# Patient Record
Sex: FEMALE | Race: WHITE | Employment: OTHER | URBAN - METROPOLITAN AREA
[De-identification: names, ages, dates, MRNs, and addresses within clinical notes are randomized per-mention and may not be internally consistent; named-entity substitution may affect disease eponyms.]

---

## 2018-04-13 ENCOUNTER — HOSPITAL ENCOUNTER (OUTPATIENT)
Dept: GENERAL RADIOLOGY | Age: 63
Discharge: HOME | End: 2018-04-13
Attending: INTERNAL MEDICINE
Payer: MEDICARE

## 2018-04-13 DIAGNOSIS — K59.00 CONSTIPATION: ICD-10-CM

## 2018-04-13 PROCEDURE — 74019 RADEX ABDOMEN 2 VIEWS: CPT

## 2018-05-03 ENCOUNTER — OFFICE VISIT (OUTPATIENT)
Dept: FAMILY MEDICINE CLINIC | Age: 63
End: 2018-05-03

## 2018-05-03 VITALS
WEIGHT: 228 LBS | OXYGEN SATURATION: 97 % | TEMPERATURE: 98.1 F | HEART RATE: 67 BPM | HEIGHT: 67 IN | DIASTOLIC BLOOD PRESSURE: 83 MMHG | BODY MASS INDEX: 35.79 KG/M2 | RESPIRATION RATE: 18 BRPM | SYSTOLIC BLOOD PRESSURE: 121 MMHG

## 2018-05-03 DIAGNOSIS — E11.9 TYPE 2 DIABETES MELLITUS WITHOUT COMPLICATION, WITHOUT LONG-TERM CURRENT USE OF INSULIN (HCC): ICD-10-CM

## 2018-05-03 DIAGNOSIS — I48.19 PERSISTENT ATRIAL FIBRILLATION (HCC): Primary | ICD-10-CM

## 2018-05-03 RX ORDER — ROPINIROLE 1 MG/1
TABLET, FILM COATED ORAL 3 TIMES DAILY
COMMUNITY
End: 2018-09-27 | Stop reason: CLARIF

## 2018-05-03 RX ORDER — LORAZEPAM 1 MG/1
TABLET ORAL
COMMUNITY
End: 2018-07-18 | Stop reason: SDUPTHER

## 2018-05-03 RX ORDER — MONTELUKAST SODIUM 4 MG/1
1 TABLET, CHEWABLE ORAL 2 TIMES DAILY
COMMUNITY
End: 2018-07-18 | Stop reason: ALTCHOICE

## 2018-05-03 RX ORDER — ROSUVASTATIN CALCIUM 40 MG/1
20 TABLET, COATED ORAL
COMMUNITY

## 2018-05-03 RX ORDER — LEVOTHYROXINE SODIUM 150 UG/1
TABLET ORAL
COMMUNITY
End: 2018-07-27 | Stop reason: SDUPTHER

## 2018-05-03 RX ORDER — MONTELUKAST SODIUM 10 MG/1
10 TABLET ORAL DAILY
COMMUNITY

## 2018-05-03 RX ORDER — GABAPENTIN 300 MG/1
300 CAPSULE ORAL 3 TIMES DAILY
COMMUNITY
End: 2018-07-18 | Stop reason: ALTCHOICE

## 2018-05-03 RX ORDER — ISOSORBIDE MONONITRATE 30 MG/1
TABLET, EXTENDED RELEASE ORAL DAILY
COMMUNITY
End: 2018-09-27 | Stop reason: CLARIF

## 2018-05-03 RX ORDER — OXCARBAZEPINE 150 MG/1
TABLET, FILM COATED ORAL
COMMUNITY
End: 2018-09-27 | Stop reason: CLARIF

## 2018-05-03 NOTE — PROGRESS NOTES
Pt here to establish acre. Reports she was evaluated in ED last night for chest pain. States she has been diagnosed with Afib and eft bundle branch block. Denies having any pain at this time. Pt reports that she has been to [facility] three times this year, first two were ER visits, last time she was admitted due to chest pain. Pt reports that she had multiple EKGs, cardiac cath, echo, CT scan- all were normal, no stents. Pt was first diagnosed with afib in 2008, had 3 ablations in 2010, was placed on metoprolol with good control, however [facility] told her that her afib had returned. Pt see Dr. Caroline Grover for cardiology as well as Dr. Tati De La Cruz. Pt reports that she has had chest pain since 2008, improved after the ablation, but has returned int he past 3 months. Pt espinal report high levels of stress at this time due to illness in her family. Pt reports that her BP was elevated in the ER last night, does normally have a problem with blood pressure issues. Last HgA1c was 6.7\    Pt is a retired teracher    Subjective: (As above and below)   No chief complaint on file. she is a 57y.o. year old female who presents for evaluation. Reviewed PmHx, RxHx, FmHx, SocHx, AllgHx and updated in chart.     Review of Systems - negative except as listed above    Objective:     Vitals:    05/03/18 1424   BP: 121/83   Pulse: 67   Resp: 18   Temp: 98.1 °F (36.7 °C)   TempSrc: Oral   SpO2: 97%   Weight: 228 lb (103.4 kg)   Height: 5' 7\" (1.702 m)     Physical Examination: General appearance - alert, well appearing, and in no distress  Mental status - normal mood, behavior, speech, dress, motor activity, and thought processes  Mouth - mucous membranes moist, pharynx normal without lesions  Chest - clear to auscultation, no wheezes, rales or rhonchi, symmetric air entry  Heart - irregularly irregular   Musculoskeletal - walks with a cane  Extremities - peripheral pulses normal, no pedal edema, no clubbing or cyanosis    Bladder pacemaker in place  Assessment/ Plan:   1. Persistent atrial fibrillation ([address]  -continue on current medications, keep cardiology follow up    2. Type 2 diabetes mellitus without complication, without long-term current use of insulin ([address]  Well controlled per pt reports  Last eye exam was 1/2018      Follow-up Disposition: As needed  I have discussed the diagnosis with the patient and the intended plan as seen in the above orders. The patient has received an after-visit summary and questions were answered concerning future plans. Medication Side Effects and Warnings were discussed with patient: yes  Patient Labs were reviewed: yes  Patient Past Records were reviewed:  yes    Jason Mckeon M.D. Discussed the patient's BMI with her. The BMI follow up plan is as follows:     dietary management education, guidance, and counseling  encourage exercise  monitor weight  prescribed dietary intake    An After Visit Summary was printed and given to the patient.

## 2018-05-03 NOTE — PATIENT INSTRUCTIONS
Body Mass Index: Care Instructions  Your Care Instructions    Body mass index (BMI) can help you see if your weight is raising your risk for health problems. It uses a formula to compare how much you weigh with how tall you are. · A BMI lower than 18.5 is considered underweight. · A BMI between 18.5 and 24.9 is considered healthy. · A BMI between 25 and 29.9 is considered overweight. A BMI of 30 or higher is considered obese. If your BMI is in the normal range, it means that you have a lower risk for weight-related health problems. If your BMI is in the overweight or obese range, you may be at increased risk for weight-related health problems, such as high blood pressure, heart disease, stroke, arthritis or joint pain, and diabetes. If your BMI is in the underweight range, you may be at increased risk for health problems such as fatigue, lower protection (immunity) against illness, muscle loss, bone loss, hair loss, and hormone problems. BMI is just one measure of your risk for weight-related health problems. You may be at higher risk for health problems if you are not active, you eat an unhealthy diet, or you drink too much alcohol or use tobacco products. Follow-up care is a key part of your treatment and safety. Be sure to make and go to all appointments, and call your doctor if you are having problems. It's also a good idea to know your test results and keep a list of the medicines you take. How can you care for yourself at home? · Practice healthy eating habits. This includes eating plenty of fruits, vegetables, whole grains, lean protein, and low-fat dairy. · If your doctor recommends it, get more exercise. Walking is a good choice. Bit by bit, increase the amount you walk every day. Try for at least 30 minutes on most days of the week. · Do not smoke. Smoking can increase your risk for health problems. If you need help quitting, talk to your doctor about stop-smoking programs and medicines. These can increase your chances of quitting for good. · Limit alcohol to 2 drinks a day for men and 1 drink a day for women. Too much alcohol can cause health problems. If you have a BMI higher than 25  · Your doctor may do other tests to check your risk for weight-related health problems. This may include measuring the distance around your waist. A waist measurement of more than 40 inches in men or 35 inches in women can increase the risk of weight-related health problems. · Talk with your doctor about steps you can take to stay healthy or improve your health. You may need to make lifestyle changes to lose weight and stay healthy, such as changing your diet and getting regular exercise. If you have a BMI lower than 18.5  · Your doctor may do other tests to check your risk for health problems. · Talk with your doctor about steps you can take to stay healthy or improve your health. You may need to make lifestyle changes to gain or maintain weight and stay healthy, such as getting more healthy foods in your diet and doing exercises to build muscle. Where can you learn more? Go to http://junior-mckayla.info/. Enter S176 in the search box to learn more about \"Body Mass Index: Care Instructions. \"  Current as of: October 13, 2016  Content Version: 11.4  © 6581-4058 [employer]wise[employer]. Care instructions adapted under license by [employer] (which disclaims liability or warranty for this information). If you have questions about a medical condition or this instruction, always ask your healthcare professional. [address] any warranty or liability for your use of this information.

## 2018-05-03 NOTE — MR AVS SNAPSHOT
[address] [address] 
[phone] Patient: Jalen Elizondo MRN: [id number] BTD:4/8/[birth year]41 Visit Information Date & Time Provider Department Dept. Phone Encounter #  
 5/3/2018  2:00 PM Ashkan Hardin MD [address] [facility] [phone] [id number] Your Appointments 7/26/2018 11:00 AM  
New Patient with Mortimer Brash., MD  
[facility] at [facility] [address] [facility]) Appt Note: Np 4 mth f/up for migraines and tremors [place] 03/30/18  
 [facility], 
[address] [place] [address], [address] [phone]  
[address] [facility], [address] [place] [address], [address] [phone] Upcoming Health Maintenance Date Due Hepatitis C Screening 1955 COLONOSCOPY 5/4/1971 DTaP/Tdap/Td series (1 - Tdap) 5/4/1974 PAP AKA CERVICAL CYTOLOGY 5/4/1974 BREAST CANCER SCRN MAMMOGRAM 5/4/2003 ZOSTER VACCINE AGE 60> 3/4/2013 MEDICARE YEARLY EXAM 3/28/2018 Bone Densitometry (Dexa) Screening 5/4/2018 Influenza Age 5 to Adult 8/1/2018 Allergies as of 5/3/2018  Review Complete On: 5/3/2018 By: Suzi Gonzales MD  
  
 Severity Noted Reaction Type Reactions Shellfish Derived High 07/12/2013    Anaphylaxis Ciprofloxacin  04/21/2016    Hives Theophylline  07/12/2013    Hives Current Immunizations  Never Reviewed No immunizations on file. Not reviewed this visit You Were Diagnosed With   
  
 Codes Comments Persistent atrial fibrillation (HCC)    -  Primary ICD-10-CM: I48.1 ICD-9-CM: 427.31 Type 2 diabetes mellitus without complication, without long-term current use of insulin (HCC)     ICD-10-CM: E11.9 ICD-9-CM: 250.00 Vitals BP Pulse Temp Resp Height(growth percentile) Weight(growth percentile)  121/83 (BP 1 Location: Right arm, BP Patient Position: Sitting) 67 98.1 °F (36.7 °C) (Oral) 18 5' 7\" (1.702 m) 228 lb (103.4 kg) SpO2 BMI OB Status Smoking Status 97% 35.71 kg/m2 Postmenopausal Never Smoker Vitals History BMI and BSA Data Body Mass Index Body Surface Area 35.71 kg/m 2 2.21 m 2 Preferred Pharmacy Pharmacy Name Phone [facility] [address] [place] [address] [phone] Your Updated Medication List  
  
   
This list is accurate as of 5/3/18  2:59 PM.  Always use your most recent med list.  
  
  
  
  
 COLESTID 1 gram tablet Generic drug:  colestipol Take 1 g by mouth two (2) times a day. CRESTOR 40 mg tablet Generic drug:  rosuvastatin Take 40 mg by mouth nightly.  
  
 gabapentin 300 mg capsule Commonly known as:  NEURONTIN Take 300 mg by mouth three (3) times daily. isosorbide mononitrate ER 30 mg tablet Commonly known as:  IMDUR Take  by mouth daily. JANUVIA 100 mg tablet Generic drug:  SITagliptin Take 100 mg by mouth daily. JARDIANCE 10 mg tablet Generic drug:  empagliflozin Take  by mouth daily. levothyroxine 150 mcg tablet Commonly known as:  SYNTHROID Take  by mouth Daily (before breakfast). LORazepam 1 mg tablet Commonly known as:  ATIVAN Take  by mouth every four (4) hours as needed for Anxiety. montelukast 10 mg tablet Commonly known as:  SINGULAIR Take 10 mg by mouth daily. OXcarbazepine 150 mg tablet Commonly known as:  TRILEPTAL Take  by mouth. QVAR 80 mcg/actuation [employer] Generic drug:  beclomethasone Take 1 Puff by inhalation two (2) times a day. REQUIP 1 mg tablet Generic drug:  rOPINIRole Take  by mouth three (3) times daily. XARELTO 20 mg Tab tablet Generic drug:  rivaroxaban Take  by mouth daily. Patient Instructions Body Mass Index: Care Instructions Your Care Instructions Body mass index (BMI) can help you see if your weight is raising your risk for health problems. It uses a formula to compare how much you weigh with how tall you are. · A BMI lower than 18.5 is considered underweight. · A BMI between 18.5 and 24.9 is considered healthy. · A BMI between 25 and 29.9 is considered overweight. A BMI of 30 or higher is considered obese. If your BMI is in the normal range, it means that you have a lower risk for weight-related health problems. If your BMI is in the overweight or obese range, you may be at increased risk for weight-related health problems, such as high blood pressure, heart disease, stroke, arthritis or joint pain, and diabetes. If your BMI is in the underweight range, you may be at increased risk for health problems such as fatigue, lower protection (immunity) against illness, muscle loss, bone loss, hair loss, and hormone problems. BMI is just one measure of your risk for weight-related health problems. You may be at higher risk for health problems if you are not active, you eat an unhealthy diet, or you drink too much alcohol or use tobacco products. Follow-up care is a key part of your treatment and safety. Be sure to make and go to all appointments, and call your doctor if you are having problems. It's also a good idea to know your test results and keep a list of the medicines you take. How can you care for yourself at home? · Practice healthy eating habits. This includes eating plenty of fruits, vegetables, whole grains, lean protein, and low-fat dairy. · If your doctor recommends it, get more exercise. Walking is a good choice. Bit by bit, increase the amount you walk every day. Try for at least 30 minutes on most days of the week. · Do not smoke. Smoking can increase your risk for health problems. If you need help quitting, talk to your doctor about stop-smoking programs and medicines. These can increase your chances of quitting for good. · Limit alcohol to 2 drinks a day for men and 1 drink a day for women. Too much alcohol can cause health problems. If you have a BMI higher than 25 · Your doctor may do other tests to check your risk for weight-related health problems. This may include measuring the distance around your waist. A waist measurement of more than 40 inches in men or 35 inches in women can increase the risk of weight-related health problems. · Talk with your doctor about steps you can take to stay healthy or improve your health. You may need to make lifestyle changes to lose weight and stay healthy, such as changing your diet and getting regular exercise. If you have a BMI lower than 18.5 · Your doctor may do other tests to check your risk for health problems. · Talk with your doctor about steps you can take to stay healthy or improve your health. You may need to make lifestyle changes to gain or maintain weight and stay healthy, such as getting more healthy foods in your diet and doing exercises to build muscle. Where can you learn more? Go to http://junior-mckayla.info/. Enter S176 in the search box to learn more about \"Body Mass Index: Care Instructions. \" Current as of: October 13, 2016 Content Version: 11.4 © 8383-4945 [employer]. Care instructions adapted under license by [employer] (which disclaims liability or warranty for this information). If you have questions about a medical condition or this instruction, always ask your healthcare professional. [address] any warranty or liability for your use of this information. Introducing [facility]! Dear Carley Bañuelos: Thank you for requesting a [employer] account. Our records indicate that you have previously registered for a [employer] account but its currently inactive. Please call our [employer] support line at [phone]. Additional Information If you have questions, please visit the Frequently Asked Questions section of the [employer] website at [email or web address] Remember, [employer] is NOT to be used for urgent needs. For medical emergencies, dial 911. Now available from your iPhone and Android! Please provide this summary of care documentation to your next provider. Your primary care clinician is listed as Vipul Hardin. If you have any questions after today's visit, please call [phone].

## 2018-06-27 ENCOUNTER — DOCUMENTATION ONLY (OUTPATIENT)
Dept: FAMILY MEDICINE CLINIC | Age: 63
End: 2018-06-27

## 2018-06-27 NOTE — PROGRESS NOTES
Patient would like the Verification of Chronic Condition Form completed. It is in the bin up front. Please fax when completed to [facility] [employer] at [phone].  Patient's phone: [phone]

## 2018-06-30 PROBLEM — J45.909 ASTHMA: Status: ACTIVE | Noted: 2018-06-30

## 2018-07-18 ENCOUNTER — HOSPITAL ENCOUNTER (OUTPATIENT)
Dept: LAB | Age: 63
Discharge: HOME | End: 2018-07-18
Payer: MEDICARE

## 2018-07-18 ENCOUNTER — OFFICE VISIT (OUTPATIENT)
Dept: FAMILY MEDICINE CLINIC | Age: 63
End: 2018-07-18

## 2018-07-18 VITALS
DIASTOLIC BLOOD PRESSURE: 85 MMHG | SYSTOLIC BLOOD PRESSURE: 133 MMHG | OXYGEN SATURATION: 94 % | TEMPERATURE: 98.4 F | HEIGHT: 67 IN | BODY MASS INDEX: 34.84 KG/M2 | RESPIRATION RATE: 19 BRPM | HEART RATE: 89 BPM | WEIGHT: 222 LBS

## 2018-07-18 DIAGNOSIS — Z51.81 MEDICATION MONITORING ENCOUNTER: ICD-10-CM

## 2018-07-18 DIAGNOSIS — R42 VERTIGO: ICD-10-CM

## 2018-07-18 DIAGNOSIS — I48.19 PERSISTENT ATRIAL FIBRILLATION (HCC): Primary | ICD-10-CM

## 2018-07-18 LAB
BILIRUB UR QL STRIP: NEGATIVE
GLUCOSE UR-MCNC: NORMAL MG/DL
KETONES P FAST UR STRIP-MCNC: NEGATIVE MG/DL
PH UR STRIP: 6 [PH] (ref 4.6–8)
PROT UR QL STRIP: NEGATIVE
SP GR UR STRIP: 1.01 (ref 1–1.03)
UA UROBILINOGEN AMB POC: NORMAL (ref 0.2–1)
URINALYSIS CLARITY POC: CLEAR
URINALYSIS COLOR POC: YELLOW
URINE BLOOD POC: NEGATIVE
URINE LEUKOCYTES POC: NEGATIVE
URINE NITRITES POC: NEGATIVE

## 2018-07-18 PROCEDURE — 82043 UR ALBUMIN QUANTITATIVE: CPT

## 2018-07-18 RX ORDER — BUPROPION HYDROCHLORIDE 300 MG/1
300 TABLET ORAL
COMMUNITY
End: 2018-10-04 | Stop reason: CLARIF

## 2018-07-18 RX ORDER — METOPROLOL TARTRATE 50 MG/1
TABLET ORAL
COMMUNITY
End: 2018-09-27 | Stop reason: CLARIF

## 2018-07-18 RX ORDER — HYDROCODONE BITARTRATE AND ACETAMINOPHEN 5; 325 MG/1; MG/1
1 TABLET ORAL
COMMUNITY
Start: 2016-04-21 | End: 2018-10-04 | Stop reason: CLARIF

## 2018-07-18 RX ORDER — DULOXETIN HYDROCHLORIDE 60 MG/1
120 CAPSULE, DELAYED RELEASE ORAL DAILY
COMMUNITY
Start: 2018-07-18

## 2018-07-18 RX ORDER — TRAZODONE HYDROCHLORIDE 150 MG/1
100-200 TABLET ORAL
COMMUNITY

## 2018-07-18 RX ORDER — ONDANSETRON 4 MG/1
4 TABLET, ORALLY DISINTEGRATING ORAL
COMMUNITY
Start: 2018-07-18

## 2018-07-18 RX ORDER — LORAZEPAM 1 MG/1
TABLET ORAL
COMMUNITY
Start: 2018-07-18 | End: 2018-10-04 | Stop reason: CLARIF

## 2018-07-18 RX ORDER — CYCLOBENZAPRINE HCL 5 MG
5 TABLET ORAL
COMMUNITY
Start: 2018-03-06 | End: 2018-10-04 | Stop reason: CLARIF

## 2018-07-18 NOTE — PROGRESS NOTES
Chief Complaint   Patient presents with    Dizziness     Started 5/9/18   [facility] Follow Up     Jorge's on 6/22/17-6/26/17     Pt seen in the office today for c/o of dizziness. She states the daily episodes started 5/9/18 shortly after a surgical procedure days after. Pt presents with a very unsteady gait ambulating with the assistance of a cane. She has since stopped driving and is currently being followed by ENT who originally prescribed Meclizine that has not been effective. MRI has been ordered per pt. Pt also reports on 6/22-6/26 she was admitted to [facility] for self harm and contemplating overdosing Hydrocodone. She stated she called her psych provider, advised pt to seek urgent care. Pt contracts for safety, denies self harm. Denies SI/HI at this time. Pt's spouse accompanied her, he remains in the waiting room. Pt is concerned that one of her medications could be causing her vertigo symptoms. Pt brought all of her medications-extensive medication review completed, multiple discrepancies between our list, pharmacy records and what she has. List was updated to reflect medication pt brought with her and reported taking. Pt had her labs done with endo, had an eye exam last month per pt report. Subjective: (As above and below)     Chief Complaint   Patient presents with    Dizziness     Started 5/9/18   [facility] Follow Up     Jorge's on 6/22/17-6/26/17     she is a 70y.o. year old female who presents for evaluation. Reviewed PmHx, RxHx, FmHx, SocHx, AllgHx and updated in chart.     Review of Systems - negative except as listed above    Objective:     Vitals:    07/18/18 0721   BP: 133/85   Pulse: 89   Resp: 19   Temp: 98.4 °F (36.9 °C)   TempSrc: Oral   SpO2: 94%   Weight: 222 lb (100.7 kg)   Height: 5' 7\" (1.702 m)     Physical Examination: General appearance - alert, well appearing, and in no distress  Mental status - depressed, reports stress related to illness in her parents and her own health  Mouth - mucous membranes moist, pharynx normal without lesions  Chest - clear to auscultation, no wheezes, rales or rhonchi, symmetric air entry  Heart - normal rate, regular rhythm, normal S1, S2, no murmurs, rubs, clicks or gallops  Musculoskeletal - walks with a cane, unsteady  Extremities - peripheral pulses normal, no pedal edema, no clubbing or cyanosis    Assessment/ Plan:   1. Persistent atrial fibrillation ([address]  -continue Xarelto    -pt reports that she is no longer on this medication- has the pill bottle with her  - LORazepam (ATIVAN) 1 mg tablet; Please take one tablet at bedtime and one half tablet daily as needed    2. Vertigo  -check labs, d/c Singulair  -Follow up MRI  - MICROALBUMIN, UR, RAND W/ MICROALB/CREAT RATIO  - AMB POC URINALYSIS DIP STICK AUTO W/O MICRO    3. Medication monitoring encounter  -extensive medication counseling and discussion for side effects and reconciliation      Follow-up Disposition: As needed  I have discussed the diagnosis with the patient and the intended plan as seen in the above orders. The patient has received an after-visit summary and questions were answered concerning future plans.      Medication Side Effects and Warnings were discussed with patient: yes  Patient Labs were reviewed: yes  Patient Past Records were reviewed:  yes    Zulma Sanon M.D.

## 2018-07-18 NOTE — MR AVS SNAPSHOT
[address] [address] 
[phone] Patient: Mahnaz Bravo MRN: [id number] YQC:2/6/1619 Visit Information Date & Time Provider Department Dept. Phone Encounter #  
 7/18/2018  7:15 AM Germán Shah MD [address] [facility] [phone] [id number] Your Appointments 7/26/2018 11:00 AM  
New Patient with Felix Moffett MD  
[facility]: Np 4 mth f/up for migraines and tremors [place] 03/30/18  
 [address] [place] [address], 
[address] [place] [address], [address] [phone]  
[address] [facility], [address] [facility], [address] [address] Upcoming Health Maintenance Date Due Hepatitis C Screening 1955 HEMOGLOBIN A1C Q6M 1955 LIPID PANEL Q1 1955 FOOT EXAM Q1 5/4/1963 MICROALBUMIN Q1 5/4/1963 EYE EXAM RETINAL OR DILATED Q1 5/4/1963 COLONOSCOPY 5/4/1971 DTaP/Tdap/Td series (1 - Tdap) 5/4/1974 ZOSTER VACCINE AGE 60> 3/4/2013 MEDICARE YEARLY EXAM 3/28/2018 GLAUCOMA SCREENING Q2Y 5/4/2018 Bone Densitometry (Dexa) Screening 5/4/2018 Pneumococcal 65+ Low/Medium Risk (1 of 2 - PCV13) 5/4/2018 Influenza Age 5 to Adult 8/1/2018 BREAST CANCER SCRN MAMMOGRAM 6/7/2020 Allergies as of 7/18/2018  Review Complete On: 7/18/2018 By: Germán Shah MD  
  
 Severity Noted Reaction Type Reactions Shellfish Derived High 07/12/2013    Anaphylaxis Ciprofloxacin  04/21/2016    Hives Theophylline  07/12/2013    Hives Current Immunizations  Reviewed on 6/11/2018 Name Date Zoster Recombinant 6/9/2018 Not reviewed this visit You Were Diagnosed With   
  
 Codes Comments Persistent atrial fibrillation (HCC)    -  Primary ICD-10-CM: I48.1 ICD-9-CM: 427.31 Vertigo     ICD-10-CM: V17 ICD-9-CM: 780.4 Medication monitoring encounter     ICD-10-CM: Z51.81 
ICD-9-CM: V58.83 Vitals BP Pulse Temp Resp Height(growth percentile) Weight(growth percentile) 133/85 (BP 1 Location: Left arm, BP Patient Position: Sitting) 89 98.4 °F (36.9 °C) (Oral) 19 5' 7\" (1.702 m) 222 lb (100.7 kg) SpO2 BMI OB Status Smoking Status 94% 34.77 kg/m2 Postmenopausal Never Smoker Vitals History BMI and BSA Data Body Mass Index Body Surface Area 34.77 kg/m 2 2.18 m 2 Preferred Pharmacy Pharmacy Name Phone [facility] [address] [facility], [address] [place] [address] [phone] Your Updated Medication List  
  
   
This list is accurate as of 7/18/18  8:09 AM.  Always use your most recent med list.  
  
  
  
  
 buPROPion  mg XL tablet Commonly known as:  Franco  Take 300 mg by mouth. CRESTOR 40 mg tablet Generic drug:  rosuvastatin Take 40 mg by mouth nightly. cyclobenzaprine 5 mg tablet Commonly known as:  FLEXERIL Take 5 mg by mouth daily as needed. DULoxetine 60 mg capsule Commonly known as:  CYMBALTA Take 2 Caps by mouth daily. HYDROcodone-acetaminophen 5-325 mg per tablet Commonly known as:  Birda Moffat Take 1 Tab by mouth two (2) times daily as needed. isosorbide mononitrate ER 30 mg tablet Commonly known as:  IMDUR Take  by mouth daily. JANUVIA 100 mg tablet Generic drug:  SITagliptin Take 100 mg by mouth daily. JARDIANCE 10 mg tablet Generic drug:  empagliflozin Take  by mouth daily. levothyroxine 150 mcg tablet Commonly known as:  SYNTHROID Take  by mouth Daily (before breakfast). LORazepam 1 mg tablet Commonly known as:  ATIVAN Please take one tablet at bedtime and one half tablet daily as needed  
  
 metoprolol tartrate 50 mg tablet Commonly known as:  LOPRESSOR Take  by mouth.  
  
 montelukast 10 mg tablet Commonly known as:  SINGULAIR Take 10 mg by mouth daily. ondansetron 4 mg disintegrating tablet Commonly known as:  ZOFRAN ODT Take 1 Tab by mouth every eight (8) hours as needed for Nausea. OXcarbazepine 150 mg tablet Commonly known as:  TRILEPTAL Take  by mouth. QVAR 80 mcg/actuation [place]ÃƒÂº Generic drug:  beclomethasone Take 1 Puff by inhalation two (2) times a day. REQUIP 1 mg tablet Generic drug:  rOPINIRole Take  by mouth three (3) times daily. traZODone 150 mg tablet Commonly known as:  Alonso Oh Take 100-200 mg by mouth nightly. XARELTO 20 mg Tab tablet Generic drug:  rivaroxaban Take  by mouth daily. We Performed the Following AMB POC URINALYSIS DIP STICK AUTO W/O MICRO [34102 CPT(R)] MICROALBUMIN, UR, RAND W/ MICROALB/CREAT RATIO [id number] CPT(R)] Introducing [facility]! [employer] introduces [employer] patient portal. Now you can access parts of your medical record, email your doctor's office, and request medication refills online. 1. In your internet browser, go to [email or web address] 2. Click on the First Time User? Click Here link in the Sign In box. You will see the New Member Sign Up page. 3. Enter your [employer] Access Code exactly as it appears below. You will not need to use this code after youve completed the sign-up process. If you do not sign up before the expiration date, you must request a new code. · [employer] Access Code: [email or web address] Expires: 8/15/2018  5:25 AM 
 
4. Enter the last four digits of your Social Security Number (xxxx) and Date of Birth (mm/dd/yyyy) as indicated and click Submit. You will be taken to the next sign-up page. 5. Create a [employer] ID. This will be your [employer] login ID and cannot be changed, so think of one that is secure and easy to remember. 6. Create a [employer] password. You can change your password at any time. 7. Enter your Password Reset Question and Answer. This can be used at a later time if you forget your password. 8. Enter your e-mail address. You will receive e-mail notification when new information is available in [address] 9. Click Sign Up. You can now view and download portions of your medical record. 10. Click the Download Summary menu link to download a portable copy of your medical information. If you have questions, please visit the Frequently Asked Questions section of the [employer] website. Remember, [employer] is NOT to be used for urgent needs. For medical emergencies, dial 911. Now available from your iPhone and Android! Please provide this summary of care documentation to your next provider. Your primary care clinician is listed as Vipul Hardin. If you have any questions after today's visit, please call [phone].

## 2018-07-20 LAB
ALBUMIN/CREAT UR: 5.7 MG/G CREAT (ref 0–30)
CREAT UR-MCNC: 76.8 MG/DL
MICROALBUMIN UR-MCNC: 4.4 UG/ML

## 2018-07-26 ENCOUNTER — TELEPHONE (OUTPATIENT)
Dept: NEUROLOGY | Age: 63
End: 2018-07-26

## 2018-07-26 ENCOUNTER — DOCUMENTATION ONLY (OUTPATIENT)
Dept: NEUROLOGY | Age: 63
End: 2018-07-26

## 2018-07-26 ENCOUNTER — OFFICE VISIT (OUTPATIENT)
Dept: NEUROLOGY | Age: 63
End: 2018-07-26

## 2018-07-26 VITALS
DIASTOLIC BLOOD PRESSURE: 62 MMHG | SYSTOLIC BLOOD PRESSURE: 124 MMHG | OXYGEN SATURATION: 98 % | WEIGHT: 231.6 LBS | BODY MASS INDEX: 36.35 KG/M2 | HEART RATE: 99 BPM | HEIGHT: 67 IN

## 2018-07-26 DIAGNOSIS — R42 DIZZINESS: ICD-10-CM

## 2018-07-26 DIAGNOSIS — G50.1 ATYPICAL FACIAL PAIN: Primary | ICD-10-CM

## 2018-07-26 DIAGNOSIS — G25.0 ESSENTIAL TREMOR: ICD-10-CM

## 2018-07-26 DIAGNOSIS — G25.81 RESTLESS LEG SYNDROME: ICD-10-CM

## 2018-07-26 DIAGNOSIS — I95.1 ORTHOSTATIC HYPOTENSION: ICD-10-CM

## 2018-07-26 PROBLEM — E66.01 SEVERE OBESITY (BMI 35.0-39.9): Status: ACTIVE | Noted: 2018-07-26

## 2018-07-26 NOTE — PROGRESS NOTES
NEUROLOGY CLINIC NOTE    Patient ID:  Ben Carbone  [id number]  19 y.o.  1955    Date of Consultation:  July 26, 2018    Reason for Consultation:  Establish care    Chief Complaint   Patient presents with    Follow-up     migraines/vertigo       History of Present Illness:     Patient Active Problem List    Diagnosis Date Noted    Severe obesity (BMI 35.0-39.9) ([address] 07/26/2018    Asthma 06/30/2018    Diabetes ([address]     Factor V Leiden ([address]     [facility]     Supraorbital headache 10/07/2015    Isthmica nodosa salpingitis 07/12/2013    Anxiety 07/12/2013    Essential and other specified forms of tremor 07/12/2013     Past Medical History:   Diagnosis Date    A-fib [facility])     Anxiety 7/12/2013    Arrhythmia     \"fast heart rate\". Ablation x2    Arthritis     Asthma     Depression     Diabetes ([address]     Factor V Leiden ([facility]     Isthmica nodosa salpingitis 7/12/2013    Thyroid disease       Past Surgical History:   Procedure Laterality Date    ABDOMEN SURGERY PROC UNLISTED      cholecystectomy    CARDIAC SURG PROCEDURE UNLIST      ablation 2010 x2    HX CHOLECYSTECTOMY      HX HEENT      HX PACEMAKER        Prior to Admission medications    Medication Sig Start Date End Date Taking? Authorizing Provider   metoprolol tartrate (LOPRESSOR) 50 mg tablet Take  by mouth. Yes Historical Provider   LORazepam (ATIVAN) 1 mg tablet Please take one tablet at bedtime and one half tablet daily as needed 7/18/18  Yes Leilani Hardin MD   buPROPion XL (WELLBUTRIN XL) 300 mg XL tablet Take 300 mg by mouth. Yes Historical Provider   cyclobenzaprine (FLEXERIL) 5 mg tablet Take 5 mg by mouth daily as needed. 3/6/18  Yes Historical Provider   HYDROcodone-acetaminophen (NORCO) 5-325 mg per tablet Take 1 Tab by mouth two (2) times daily as needed. 4/21/16  Yes Historical Provider   DULoxetine (CYMBALTA) 60 mg capsule Take 2 Caps by mouth daily.  7/18/18  Yes Rochelle Haley MD   rivaroxaban (XARELTO) 20 mg tab tablet Take  by mouth daily. Yes Historical Provider   empagliflozin (JARDIANCE) 10 mg tablet Take  by mouth daily. Yes Historical Provider   OXcarbazepine (TRILEPTAL) 150 mg tablet Take  by mouth. Yes Historical Provider   levothyroxine (SYNTHROID) 150 mcg tablet Take  by mouth Daily (before breakfast). Yes Historical Provider   isosorbide mononitrate ER (IMDUR) 30 mg tablet Take  by mouth daily. Yes Historical Provider   rosuvastatin (CRESTOR) 40 mg tablet Take 40 mg by mouth nightly. Yes Historical Provider   montelukast (SINGULAIR) 10 mg tablet Take 10 mg by mouth daily. Yes Historical Provider   rOPINIRole (REQUIP) 1 mg tablet Take  by mouth three (3) times daily. Yes Historical Provider   traZODone (DESYREL) 150 mg tablet Take 100-200 mg by mouth nightly. Historical Provider   ondansetron (ZOFRAN ODT) 4 mg disintegrating tablet Take 1 Tab by mouth every eight (8) hours as needed for Nausea. 7/18/18   Yaz Hardin MD   SITagliptin (JANUVIA) 100 mg tablet Take 100 mg by mouth daily. Historical Provider   beclomethasone (QVAR) 80 mcg/actuation aero Take 1 Puff by inhalation two (2) times a day. Historical Provider     Allergies   Allergen Reactions    Shellfish Derived Anaphylaxis    Ciprofloxacin Hives    Theophylline Hives      Social History   Substance Use Topics    Smoking status: Never Smoker    Smokeless tobacco: Never Used    Alcohol use No      Family History   Problem Relation Age of Onset    Heart Disease Father     Cancer Maternal Grandfather         Subjective:      Bushra Miller is a 70 y.o. IWHT with multiple medical problems who is being seen by neurology for a typical facial pain, essential tremors and restless leg syndrome who is here to establish her care. Patient was previously seen at [address] [place] [address] neurology. Per patient she would only have headaches with a sinus infections. No known triggers.  No new medications or adjustments were made. Gradual onset. Located left mid eyebrow and radiates up the left forehead. Sharp, ache or burn, sometimes a pressure. Rangers from 1 to 7/10. It would lasts from 30 minutes to one hour but can recur for as long as 2 weeks. Associated with light and noise sensitivity. No known precipitants. Light or noise makes it worse. Sleeping helps. She does have pain free periods for 1 to 2 weeks. Site is tender and sensitive to touch. She saw Dr. Dickson Murphy (neurologist) 9/25/2015 and was referred to Dr. Leanna Lao for supraorbital nerve block. She was seen 10/7/2015 and did received the nerve block. It offered relief but then recurred. She has had two 7/10 exacerbations. One was last 10/13/2015 and she was seen at [facility]. Head CT done was normal compared to one done 9/10/2013. Labs revealed mild hypokalemia. Treated with IV fluids and medications. Discharged on Tramadol. Next episode was 12/11/15. That episode was different in that pain started to radiate to the left zygomaticus area and temple. Seen at [facility]. Treated with IV fluids and medications. Discharged on [phone]. She does have pain free periods for 1 to 2 weeks. Site is tender and sensitive to touch. Last episode of facial pain was a week PTC. She reports no issues with sleep. Sleep 8 to 9 hours a night. Wakes up feeling rested. No daytime sleepiness. No vivid dreams. She does mention issue with postural and intentional intermittent hand tremors. She was seeing Dr. Ange Reynoso for it and was being treated with Topiramate. She is currently taking 200 mg at bedtime. She has not seen any improvement of the intermittent tremors. Tremors occurred while she was taking  her psychiatric medications. 7/17/2017, labs done revealed elevated glucose, slightly elevated creatinine, low GFR and elevated ALT. None detected oxcarbazepine level. Since her last evaluation on 3/30/2018, patient in the interim was seen by Dr. Tra Franks (neurologist) last 5/18/2018.   His plan was to discontinue gabapentin and Requip and patient to start Lyrica 75 mg at bedtime. Brain MRI was also ordered which per patient was done at [facility] and reported to be unremarkable. Patient also in the interim was admitted [facility] last April 2018 for chest pain. Patient was seen by cardiology 4/16/2018  Dr. Bindu Rodriguez who wanted to obtain a cardiac MRI pending okay due to bladder stimulator. Patient had an event monitor placed and was read on 4/25/2018 with no evidence of atrial fibrillation and rare PVCs. Per patient she did stop gabapentin. She reports no recurrence of her migraine headaches or facial pain. No severe pain left temple radiating to her left eye. She continues to take Oxcarbazepine 150 mg BID. Denies any side effects. Her hand tremors continue be better and leg jerking or restlessness at night have resolved with ropinirole 1 mg 2 tablets at night. Patient has had recent bouts of significant depression and suicidal ideation. Father has cancer and is out of state. Her psychiatrist is aware and recently started her on Cymbalta. She continues to use her CPAP for her KATELYNN. Patient reports last 3/9/2018 she was having workup for spinal stimulator. She then developed sudden onset of dizziness and vertigo. Patient was seen at [facility] ER. Head CT and labs were reported to be unremarkable. Discharge to home. Patient was then seen by Fermin Lopez, ENT and found to have left hearing loss and prescribed meclizine which offered no benefit. Per patient since of dizziness continues to persist. Not as severe as before. Occurs at rest but worse when standing. Occurs 4 to 5 times in a day. Longest without is 5 hours. She has not been driving because of it. Outside reports reviewed: office notes, historical medical records.     Review of Systems:    A comprehensive review of systems was negative except for:   Constitutional: positive for fatigue   Eyes: positive for none  Ears, nose, mouth, throat, and face: positive for none  Respiratory: positive for none  Cardiovascular: positive for none  Gastrointestinal: positive for none  Genitourinary: positive for none  Integument/breast: positive for none  Hematologic/lymphatic: positive for easy bruising   Musculoskeletal: positive for arthralgia, myalgia   Neurological: positive for dizziness   Behavioral/Psych: positive for depression   Endocrine: positive for none  Allergic/Immunologic: positive for none      Objective:     Visit Vitals    /62    Pulse 99    Ht 5' 7\" (1.702 m)    Wt 231 lb 9.6 oz (105.1 kg)    SpO2 98%    BMI 36.27 kg/m2   3/10 generalized pain    PHYSICAL EXAM:    NEUROLOGICAL EXAM:    Appearance: The patient is obese, provides a coherent history and is in no acute distress. Mental Status: Oriented to time, place and person. Fluent, no aphasia or dysarthria. Flat affect. Cranial Nerves:   Intact visual fields. LEROY, EOM's full, no nystagmus, no ptosis. Facial sensation is normal. Corneal reflexes are intact. Facial movement is symmetric. Hearing is normal bilaterally. Palate is midline with normal elevation. Sternocleidomastoid and trapezius muscles are normal. Tongue is midline. Motor:  5/5 strength in upper and lower proximal and distal muscles. Normal bulk and tone. No fasciculations. Reflexes:   Deep tendon reflexes 1+/4 and symmetrical.    Sensory:   Normal to touch, pinprick and vibration. Gait:   Steady. No Romberg. Tremor:   No tremors noted. Cerebellar:  Intact FTN/ABHAY/HTS. Neurovascular:  No carotid bruits. Assessment:   Atypical facial pain  Dizziness  Orthostatic hypotension  Essential tremor  Restless leg syndrome    Plan:   Neurological examination is unchanged. It is still non-focal. There is no left CN V sensory deficit seen. This is not uncommon for trigeminal neuralgia or supraorbital neuralgia. No indication yet to do another neuroimaging.  Previous head CT done twice was reported to be normal. Type of pain is not typical for migraine headaches. Possible cluster headache. Continue Oxcarbazepine 150 mg daily and up to BID if necessary. Other options include AED (Lyrica, Depakote and etc.), TCA's (Amitriptyline and etc.) and etc. Potential supraorbital or trial of occipital nerve blocks if condition worsens. Check CBC, CMP and oxcarbazepine level. Exam does not reveal any brainstem or cerebellar dysfunction. Suspect because of dizzy spells is not primarily neurological but likely due to her medical condition or medications. May benefit from vestibular rehab. Advised to try as needed Ativan as she ready has and see if it offers relief. Check B12, folate, magnesium, TSH and ammonia levels. Supine blood pressure was 167/87 and standing blood pressure was 113/82. Patient was symptomatic upon standing with her dizzy spell and wobbliness. Findings consistent with orthostatic hypotension. Patient was referred back to her cardiologist for further evaluation. Patient was advised to do transitioning, wear support stockings and increase salt intake. Fall precautions. No obvious tremor seen today. Medication induced or worsened by previous psychiatric medication. No features typically seen in Parkinson's disease. No upper motor neuron findings. Likely with some elements of essential tremors. Potential treatment options if worsens include Propranolol, Primidone and Benzodiazepines. Monitor for now given improvement. Complaint is suspicious for RLS and per patient it was recorded during her sleep study. Likely secondary RLS due to potential side effect from psychiatric medications. Improved with Ropinirole. Other potential options include Sinemet, other dopamine agonists and long acting Gabapentin. All questions and concerns were answered. Visit lasted 45 minutes.   Greater than 50% was spent discussing her condition, potential etiologies, review of medical records from her cardiologist and neurologist, presence of orthostatic hypotension and need for reevaluation by cardiology, steps to take to help reduce tendency to have orthostatic hypotension, check and do laboratory workup to look for reversible causes of her dizzy spells, fall precautions, treatment options and medication

## 2018-07-26 NOTE — TELEPHONE ENCOUNTER
Spoke with patient and gave her the appointment for Dr. Marcos Peterson office  Which is August 1,2018 at 2 pm. For patient to arrive 20-30 minutes early. Patient understood.

## 2018-07-26 NOTE — PATIENT INSTRUCTIONS
A Healthy Lifestyle: Care Instructions Your Care Instructions A healthy lifestyle can help you feel good, stay at a healthy weight, and have plenty of energy for both work and play. A healthy lifestyle is something you can share with your whole family. A healthy lifestyle also can lower your risk for serious health problems, such as high blood pressure, heart disease, and diabetes. You can follow a few steps listed below to improve your health and the health of your family. Follow-up care is a key part of your treatment and safety. Be sure to make and go to all appointments, and call your doctor if you are having problems. It's also a good idea to know your test results and keep a list of the medicines you take. How can you care for yourself at home? · Do not eat too much sugar, fat, or fast foods. You can still have dessert and treats now and then. The goal is moderation. · Start small to improve your eating habits. Pay attention to portion sizes, drink less juice and soda pop, and eat more fruits and vegetables. ¨ Eat a healthy amount of food. A 3-ounce serving of meat, for example, is about the size of a deck of cards. Fill the rest of your plate with vegetables and whole grains. ¨ Limit the amount of soda and sports drinks you have every day. Drink more water when you are thirsty. ¨ Eat at least 5 servings of fruits and vegetables every day. It may seem like a lot, but it is not hard to reach this goal. A serving or helping is 1 piece of fruit, 1 cup of vegetables, or 2 cups of leafy, raw vegetables. Have an apple or some carrot sticks as an afternoon snack instead of a candy bar. Try to have fruits and/or vegetables at every meal. 
· Make exercise part of your daily routine. You may want to start with simple activities, such as walking, bicycling, or slow swimming. Try to be active 30 to 60 minutes every day. You do not need to do all 30 to 60 minutes all at once.  For example, you can exercise 3 times a day for 10 or 20 minutes. Moderate exercise is safe for most people, but it is always a good idea to talk to your doctor before starting an exercise program. 
· Keep moving. [place] the lawn, work in the garden, or [employer]. Take the stairs instead of the elevator at work. · If you smoke, quit. People who smoke have an increased risk for heart attack, stroke, cancer, and other lung illnesses. Quitting is hard, but there are ways to boost your chance of quitting tobacco for good. ¨ Use nicotine gum, patches, or lozenges. ¨ Ask your doctor about stop-smoking programs and medicines. ¨ Keep trying. In addition to reducing your risk of diseases in the future, you will notice some benefits soon after you stop using tobacco. If you have shortness of breath or asthma symptoms, they will likely get better within a few weeks after you quit. · Limit how much alcohol you drink. Moderate amounts of alcohol (up to 2 drinks a day for men, 1 drink a day for women) are okay. But drinking too much can lead to liver problems, high blood pressure, and other health problems. Family health If you have a family, there are many things you can do together to improve your health. · Eat meals together as a family as often as possible. · Eat healthy foods. This includes fruits, vegetables, lean meats and dairy, and whole grains. · Include your family in your fitness plan. Most people think of activities such as jogging or tennis as the way to fitness, but there are many ways you and your family can be more active. Anything that makes you breathe hard and gets your heart pumping is exercise. Here are some tips: 
¨ Walk to do errands or to take your child to school or the bus. ¨ Go for a family bike ride after dinner instead of watching TV. Where can you learn more? Go to http://junior-mckayla.info/. Enter U831 in the search box to learn more about \"A Healthy Lifestyle: Care Instructions. \" Current as of: December 7, 2017 Content Version: 11.7 © 7649-6543 [employer]. Care instructions adapted under license by [employer] (which disclaims liability or warranty for this information). If you have questions about a medical condition or this instruction, always ask your healthcare professional. [address] any warranty or liability for your use of this information. Please be advised there is a $25 fee for all paperwork to be completed from our  providers. This is to be paid by the patient prior to picking up the completed forms. Dizziness: Care Instructions Your Care Instructions Dizziness is the feeling of unsteadiness or fuzziness in your head. It is different than having vertigo, which is a feeling that the room is spinning or that you are moving or falling. It is also different from lightheadedness, which is the feeling that you are about to faint. It can be hard to know what causes dizziness. Some people feel dizzy when they have migraine headaches. Sometimes bouts of flu can make you feel dizzy. Some medical conditions, such as heart problems or high blood pressure, can make you feel dizzy. Many medicines can cause dizziness, including medicines for high blood pressure, pain, or anxiety. If a medicine causes your symptoms, your doctor may recommend that you stop or change the medicine. If it is a problem with your heart, you may need medicine to help your heart work better. If there is no clear reason for your symptoms, your doctor may suggest watching and waiting for a while to see if the dizziness goes away on its own. Follow-up care is a key part of your treatment and safety. Be sure to make and go to all appointments, and call your doctor if you are having problems. It's also a good idea to know your test results and keep a list of the medicines you take. How can you care for yourself at home?  
· If your doctor recommends or prescribes medicine, take it exactly as directed. Call your doctor if you think you are having a problem with your medicine. · Do not drive while you feel dizzy. · Try to prevent falls. Steps you can take include: ¨ Using nonskid mats, adding grab bars near the tub, and using night-lights. ¨ Clearing your home so that walkways are free of anything you might trip on. ¨ Letting family and friends know that you have been feeling dizzy. This will help them know how to help you. When should you call for help? Call 911 anytime you think you may need emergency care. For example, call if: 
  · You passed out (lost consciousness).  
  · You have dizziness along with symptoms of a heart attack. These may include: ¨ Chest pain or pressure, or a strange feeling in the chest. 
¨ Sweating. ¨ Shortness of breath. ¨ Nausea or vomiting. ¨ Pain, pressure, or a strange feeling in the back, neck, jaw, or upper belly or in one or both shoulders or arms. ¨ Lightheadedness or sudden weakness. ¨ A fast or irregular heartbeat.  
  · You have symptoms of a stroke. These may include: 
¨ Sudden numbness, tingling, weakness, or loss of movement in your face, arm, or leg, especially on only one side of your body. ¨ Sudden vision changes. ¨ Sudden trouble speaking. ¨ Sudden confusion or trouble understanding simple statements. ¨ Sudden problems with walking or balance. ¨ A sudden, severe headache that is different from past headaches.  
 Call your doctor now or seek immediate medical care if: 
  · You feel dizzy and have a fever, headache, or ringing in your ears.  
  · You have new or increased nausea and vomiting.  
  · Your dizziness does not go away or comes back.  
 Watch closely for changes in your health, and be sure to contact your doctor if: 
  · You do not get better as expected. Where can you learn more? Go to http://junior-mckayla.info/.  
Enter E447 in the search box to learn more about \"Dizziness: Care Instructions. \" Current as of: November 20, 2017 Content Version: 11.7 © 9106-3545 [employer]. Care instructions adapted under license by "[employer]" (which disclaims liability or warranty for this information). If you have questions about a medical condition or this instruction, always ask your healthcare professional. [address] any warranty or liability for your use of this information. Head or Face Pain: Care Instructions Your Care Instructions Common causes of head or face pain are allergies, stress, and injuries. Other causes include tooth problems and sinus infections. Eating certain foods, such as chocolate or cheese, or drinking certain liquids, such as coffee or cola, can cause head pain for some people. If you have mild head pain, you may not need treatment. It is important to watch your symptoms and talk to your doctor if your pain continues or gets worse. Follow-up care is a key part of your treatment and safety. Be sure to make and go to all appointments, and call your doctor if you are having problems. It's also a good idea to know your test results and keep a list of the medicines you take. How can you care for yourself at home? · Take pain medicines exactly as directed. ¨ If the doctor gave you a prescription medicine for pain, take it as prescribed. ¨ If you are not taking a prescription pain medicine, ask your doctor if you can take an over-the-counter pain medicine. · Take it easy for the next few days or longer if you are not feeling well. · Use a warm, moist towel or heating pad set on low to relax tight muscles in your shoulder and neck. Have someone gently massage your neck and shoulders. · Put ice or a cold pack on the area for 10 to 20 minutes at a time. Put a thin cloth between the ice and your skin. When should you call for help? Call 911 anytime you think you may need emergency care.  For example, call if: 
  · You have twitching, jerking, or a seizure.  
  · You passed out (lost consciousness).  
  · You have symptoms of a stroke. These may include: 
¨ Sudden numbness, tingling, weakness, or loss of movement in your face, arm, or leg, especially on only one side of your body. ¨ Sudden vision changes. ¨ Sudden trouble speaking. ¨ Sudden confusion or trouble understanding simple statements. ¨ Sudden problems with walking or balance. ¨ A sudden, severe headache that is different from past headaches.  
  · You have jaw pain and pain in your chest, shoulder, neck, or arm.  
 Call your doctor now or seek immediate medical care if: 
  · You have a fever with a stiff neck or a severe headache.  
  · You have nausea and vomiting, or you cannot keep food or liquids down.  
 Watch closely for changes in your health, and be sure to contact your doctor if: 
  · Your head or face pain does not get better as expected. Where can you learn more? Go to http://junior-mckayla.info/. Enter P568 in the search box to learn more about \"Head or Face Pain: Care Instructions. \" Current as of: November 20, 2017 Content Version: 11.7 © 3739-2892 [employer]. Care instructions adapted under license by [employer] (which disclaims liability or warranty for this information). If you have questions about a medical condition or this instruction, always ask your healthcare professional. [address] any warranty or liability for your use of this information. Preventing Falls: Care Instructions Your Care Instructions Getting around your home safely can be a challenge if you have injuries or health problems that make it easy for you to fall. Loose rugs and furniture in walkways are among the dangers for many older people who have problems walking or who have poor eyesight.  People who have conditions such as arthritis, osteoporosis, or dementia also have to be careful not to fall. 
You can make your home safer with a few simple measures. Follow-up care is a key part of your treatment and safety. Be sure to make and go to all appointments, and call your doctor if you are having problems. It's also a good idea to know your test results and keep a list of the medicines you take. How can you care for yourself at home? Taking care of yourself · You may get dizzy if you do not drink enough water. To prevent dehydration, drink plenty of fluids, enough so that your urine is light yellow or clear like water. Choose water and other caffeine-free clear liquids. If you have kidney, heart, or liver disease and have to limit fluids, talk with your doctor before you increase the amount of fluids you drink. · Exercise regularly to improve your strength, muscle tone, and balance. Walk if you can. Swimming may be a good choice if you cannot walk easily. · Have your vision and hearing checked each year or any time you notice a change. If you have trouble seeing and hearing, you might not be able to avoid objects and could lose your balance. · Know the side effects of the medicines you take. Ask your doctor or pharmacist whether the medicines you take can affect your balance. Sleeping pills or sedatives can affect your balance. · Limit the amount of alcohol you drink. Alcohol can impair your balance and other senses. · Ask your doctor whether calluses or corns on your feet need to be removed. If you wear loose-fitting shoes because of calluses or corns, you can lose your balance and fall. · Talk to your doctor if you have numbness in your feet. Preventing falls at home · Remove raised doorway thresholds, throw rugs, and clutter. Repair loose carpet or raised areas in the floor. · Move furniture and electrical cords to keep them out of walking paths. · Use nonskid floor wax, and wipe up spills right away, especially on ceramic tile floors. · If you use a walker or cane, put rubber tips on it. If you use crutches, clean the bottoms of them regularly with an abrasive pad, such as steel wool. · Keep your house well lit, especially [place], and outside walkways. Use night-lights in areas such as hallways and bathrooms. Add extra light switches or use remote switches (such as switches that go on or off when you clap your hands) to make it easier to turn lights on if you have to get up during the night. · Install sturdy handrails on stairways. · Move items in your cabinets so that the things you use a lot are on the lower shelves (about waist level). · Keep a cordless phone and a flashlight with new batteries by your bed. If possible, put a phone in each of the main rooms of your house, or carry a cell phone in case you fall and cannot reach a phone. Or, you can wear a device around your neck or wrist. You push a button that sends a signal for help. · Wear low-heeled shoes that fit well and give your feet good support. Use footwear with nonskid soles. Check the heels and soles of your shoes for wear. Repair or replace worn heels or soles. · Do not wear socks without shoes on wood floors. · Walk on the grass when the sidewalks are slippery. If you live in an area that gets snow and ice in the winter, sprinkle salt on slippery steps and sidewalks. Preventing falls in the bath · Install grab bars and nonskid mats inside and outside your shower or tub and near the toilet and sinks. · Use shower chairs and bath benches. · Use a hand-held shower head that will allow you to sit while showering. · Get into a tub or shower by putting the weaker leg in first. Get out of a tub or shower with your strong side first. 
· Repair loose toilet seats and consider installing a raised toilet seat to make getting on and off the toilet easier. · Keep your bathroom door unlocked while you are in the shower. Where can you learn more? Go to http://junior-mckayla.info/.  
Enter [id number] in the search box to learn more about \"Preventing Falls: Care Instructions. \" Current as of: May 12, 2017 Content Version: 11.7 © 2394-4969 [email or web address]. Care instructions adapted under license by [employer] (which disclaims liability or warranty for this information). If you have questions about a medical condition or this instruction, always ask your healthcare professional. [place] [address] any warranty or liability for your use of this information.

## 2018-07-26 NOTE — MR AVS SNAPSHOT
[address], 
[address], [address] [facility] 
[phone] Patient: Marlene Negrete MRN: [id number] TJS:9/9/[birth year]93 Visit Information Date & Time Provider Department Dept. Phone Encounter #  
 7/26/2018 11:00 AM Ivania Herrera MD [facility] at [facility] [phone] [id number] Follow-up Instructions Return in about 2 months (around 9/26/2018) for dizziness, neuralgia. Upcoming Health Maintenance Date Due Hepatitis C Screening 1955 HEMOGLOBIN A1C Q6M 1955 LIPID PANEL Q1 1955 FOOT EXAM Q1 5/4/1963 EYE EXAM RETINAL OR DILATED Q1 5/4/1963 COLONOSCOPY 5/4/1971 DTaP/Tdap/Td series (1 - Tdap) 5/4/1974 ZOSTER VACCINE AGE 60> 3/4/2013 MEDICARE YEARLY EXAM 3/28/2018 GLAUCOMA SCREENING Q2Y 5/4/2018 Bone Densitometry (Dexa) Screening 5/4/2018 Pneumococcal 65+ Low/Medium Risk (1 of 2 - PCV13) 5/4/2018 Influenza Age 5 to Adult 8/1/2018 MICROALBUMIN Q1 7/18/2019 BREAST CANCER SCRN MAMMOGRAM 6/7/2020 Allergies as of 7/26/2018  Review Complete On: 7/26/2018 By: Gary Riddle Severity Noted Reaction Type Reactions Shellfish Derived High 07/12/2013    Anaphylaxis Ciprofloxacin  04/21/2016    Hives Theophylline  07/12/2013    Hives Current Immunizations  Reviewed on 6/11/2018 Name Date Zoster Recombinant 6/9/2018 Not reviewed this visit You Were Diagnosed With   
  
 Codes Comments Atypical facial pain    -  Primary ICD-10-CM: G50.1 ICD-9-CM: 350.2 Dizziness     ICD-10-CM: B86 ICD-9-CM: 780.4 Orthostatic hypotension     ICD-10-CM: I95.1 ICD-9-CM: 458.0 Essential tremor     ICD-10-CM: G25.0 ICD-9-CM: 333.1 Restless leg syndrome     ICD-10-CM: G25.81 ICD-9-CM: 333.94 Vitals BP Pulse Height(growth percentile) Weight(growth percentile) SpO2 BMI 124/62 99 5' 7\" (1.702 m) 231 lb 9.6 oz (105.1 kg) 98% 36.27 kg/m2 OB Status Smoking Status Postmenopausal Never Smoker BMI and BSA Data Body Mass Index Body Surface Area  
 36.27 kg/m 2 2.23 m 2 Preferred Pharmacy Pharmacy Name Phone [facility] [address] [place] [address], [address] [place] [address] [address] [phone] Your Updated Medication List  
  
   
This list is accurate as of 7/26/18 12:07 PM.  Always use your most recent med list.  
  
  
  
  
 buPROPion  mg XL tablet Commonly known as:  [place] Take 300 mg by mouth. CRESTOR 40 mg tablet Generic drug:  rosuvastatin Take 40 mg by mouth nightly. cyclobenzaprine 5 mg tablet Commonly known as:  FLEXERIL Take 5 mg by mouth daily as needed. DULoxetine 60 mg capsule Commonly known as:  CYMBALTA Take 2 Caps by mouth daily. HYDROcodone-acetaminophen 5-325 mg per tablet Commonly known as:  Alison Fothergill Take 1 Tab by mouth two (2) times daily as needed. isosorbide mononitrate ER 30 mg tablet Commonly known as:  IMDUR Take  by mouth daily. JANUVIA 100 mg tablet Generic drug:  SITagliptin Take 100 mg by mouth daily. JARDIANCE 10 mg tablet Generic drug:  empagliflozin Take  by mouth daily. levothyroxine 150 mcg tablet Commonly known as:  SYNTHROID Take  by mouth Daily (before breakfast). LORazepam 1 mg tablet Commonly known as:  ATIVAN Please take one tablet at bedtime and one half tablet daily as needed  
  
 metoprolol tartrate 50 mg tablet Commonly known as:  LOPRESSOR Take  by mouth.  
  
 montelukast 10 mg tablet Commonly known as:  SINGULAIR Take 10 mg by mouth daily. ondansetron 4 mg disintegrating tablet Commonly known as:  ZOFRAN ODT Take 1 Tab by mouth every eight (8) hours as needed for Nausea. OXcarbazepine 150 mg tablet Commonly known as:  TRILEPTAL  
 Take  by mouth. QVAR 80 mcg/actuation ABC Live Generic drug:  beclomethasone Take 1 Puff by inhalation two (2) times a day. REQUIP 1 mg tablet Generic drug:  rOPINIRole Take  by mouth three (3) times daily. traZODone 150 mg tablet Commonly known as:  Scarlett Tamayos Take 100-200 mg by mouth nightly. XARELTO 20 mg Tab tablet Generic drug:  rivaroxaban Take  by mouth daily. We Performed the Following CBC WITH AUTOMATED DIFF [27810 CPT(R)] MAGNESIUM [id number] CPT(R)] METABOLIC PANEL, COMPREHENSIVE [82952 CPT(R)] OXCARBAZEPINE(TRILEPTAL) [id number] CPT(R)] REFERRAL TO CARDIOLOGY [QRA83 Custom] Comments:  
 Care of Dr. Hermelinda Walker at [facility]. Assess for constant dizziness and periods of vertigo. Found to be significantly orthostatic in clinic and symptomatic. TSH [[id number] Custom] VITAMIN B12 & FOLATE [20091 CPT(R)] Follow-up Instructions Return in about 2 months (around 9/26/2018) for dizziness, neuralgia. To-Do List   
 08/26/2018 Lab:  [facility] Referral Information Referral ID Referred By Referred To  
  
 [id number] Donta Ravi Not Available Visits Status Start Date End Date 1 New Request 7/26/18 7/26/19 If your referral has a status of pending review or denied, additional information will be sent to support the outcome of this decision. Patient Instructions A Healthy Lifestyle: Care Instructions Your Care Instructions A healthy lifestyle can help you feel good, stay at a healthy weight, and have plenty of energy for both work and play. A healthy lifestyle is something you can share with your whole family. A healthy lifestyle also can lower your risk for serious health problems, such as high blood pressure, heart disease, and diabetes. You can follow a few steps listed below to improve your health and the health of your family. Follow-up care is a key part of your treatment and safety. Be sure to make and go to all appointments, and call your doctor if you are having problems. It's also a good idea to know your test results and keep a list of the medicines you take. How can you care for yourself at home? · Do not eat too much sugar, fat, or fast foods. You can still have dessert and treats now and then. The goal is moderation. · Start small to improve your eating habits. Pay attention to portion sizes, drink less juice and soda pop, and eat more fruits and vegetables. ¨ Eat a healthy amount of food. A 3-ounce serving of meat, for example, is about the size of a deck of cards. Fill the rest of your plate with vegetables and whole grains. ¨ Limit the amount of soda and sports drinks you have every day. Drink more water when you are thirsty. ¨ Eat at least 5 servings of fruits and vegetables every day. It may seem like a lot, but it is not hard to reach this goal. A serving or helping is 1 piece of fruit, 1 cup of vegetables, or 2 cups of leafy, raw vegetables. Have an apple or some carrot sticks as an afternoon snack instead of a candy bar. Try to have fruits and/or vegetables at every meal. 
· Make exercise part of your daily routine. You may want to start with simple activities, such as walking, bicycling, or slow swimming. Try to be active 30 to 60 minutes every day. You do not need to do all 30 to 60 minutes all at once. For example, you can exercise 3 times a day for 10 or 20 minutes. Moderate exercise is safe for most people, but it is always a good idea to talk to your doctor before starting an exercise program. 
· Keep moving. [place] Feeling the lawn, work in the garden, or Twice. Take the stairs instead of the elevator at work. · If you smoke, quit. People who smoke have an increased risk for heart attack, stroke, cancer, and other lung illnesses.  Quitting is hard, but there are ways to boost your chance of quitting tobacco for good. ¨ Use nicotine gum, patches, or lozenges. ¨ Ask your doctor about stop-smoking programs and medicines. ¨ Keep trying. In addition to reducing your risk of diseases in the future, you will notice some benefits soon after you stop using tobacco. If you have shortness of breath or asthma symptoms, they will likely get better within a few weeks after you quit. · Limit how much alcohol you drink. Moderate amounts of alcohol (up to 2 drinks a day for men, 1 drink a day for women) are okay. But drinking too much can lead to liver problems, high blood pressure, and other health problems. Family health If you have a family, there are many things you can do together to improve your health. · Eat meals together as a family as often as possible. · Eat healthy foods. This includes fruits, vegetables, lean meats and dairy, and whole grains. · Include your family in your fitness plan. Most people think of activities such as jogging or tennis as the way to fitness, but there are many ways you and your family can be more active. Anything that makes you breathe hard and gets your heart pumping is exercise. Here are some tips: 
¨ Walk to do errands or to take your child to school or the bus. ¨ Go for a family bike ride after dinner instead of watching TV. Where can you learn more? Go to http://junior-mckayla.info/. Enter A569 in the search box to learn more about \"A Healthy Lifestyle: Care Instructions. \" Current as of: December 7, 2017 Content Version: 11.7 © 2680-2795 [employer]. Care instructions adapted under license by [employer] (which disclaims liability or warranty for this information). If you have questions about a medical condition or this instruction, always ask your healthcare professional. [address] any warranty or liability for your use of this information. Please be advised there is a $25 fee for all paperwork to be completed from our  providers. This is to be paid by the patient prior to picking up the completed forms. Dizziness: Care Instructions Your Care Instructions Dizziness is the feeling of unsteadiness or fuzziness in your head. It is different than having vertigo, which is a feeling that the room is spinning or that you are moving or falling. It is also different from lightheadedness, which is the feeling that you are about to faint. It can be hard to know what causes dizziness. Some people feel dizzy when they have migraine headaches. Sometimes bouts of flu can make you feel dizzy. Some medical conditions, such as heart problems or high blood pressure, can make you feel dizzy. Many medicines can cause dizziness, including medicines for high blood pressure, pain, or anxiety. If a medicine causes your symptoms, your doctor may recommend that you stop or change the medicine. If it is a problem with your heart, you may need medicine to help your heart work better. If there is no clear reason for your symptoms, your doctor may suggest watching and waiting for a while to see if the dizziness goes away on its own. Follow-up care is a key part of your treatment and safety. Be sure to make and go to all appointments, and call your doctor if you are having problems. It's also a good idea to know your test results and keep a list of the medicines you take. How can you care for yourself at home? · If your doctor recommends or prescribes medicine, take it exactly as directed. Call your doctor if you think you are having a problem with your medicine. · Do not drive while you feel dizzy. · Try to prevent falls. Steps you can take include: ¨ Using nonskid mats, adding grab bars near the tub, and using night-lights. ¨ Clearing your home so that walkways are free of anything you might trip on. ¨ Letting family and friends know that you have been feeling dizzy. This will help them know how to help you. When should you call for help? Call 911 anytime you think you may need emergency care. For example, call if: 
  · You passed out (lost consciousness).  
  · You have dizziness along with symptoms of a heart attack. These may include: ¨ Chest pain or pressure, or a strange feeling in the chest. 
¨ Sweating. ¨ Shortness of breath. ¨ Nausea or vomiting. ¨ Pain, pressure, or a strange feeling in the back, neck, jaw, or upper belly or in one or both shoulders or arms. ¨ Lightheadedness or sudden weakness. ¨ A fast or irregular heartbeat.  
  · You have symptoms of a stroke. These may include: 
¨ Sudden numbness, tingling, weakness, or loss of movement in your face, arm, or leg, especially on only one side of your body. ¨ Sudden vision changes. ¨ Sudden trouble speaking. ¨ Sudden confusion or trouble understanding simple statements. ¨ Sudden problems with walking or balance. ¨ A sudden, severe headache that is different from past headaches.  
 Call your doctor now or seek immediate medical care if: 
  · You feel dizzy and have a fever, headache, or ringing in your ears.  
  · You have new or increased nausea and vomiting.  
  · Your dizziness does not go away or comes back.  
 Watch closely for changes in your health, and be sure to contact your doctor if: 
  · You do not get better as expected. Where can you learn more? Go to http://junior-mckayla.info/. Enter Z640 in the search box to learn more about \"Dizziness: Care Instructions. \" Current as of: November 20, 2017 Content Version: 11.7 © 6924-4546 [employer]. Care instructions adapted under license by [employer] (which disclaims liability or warranty for this information).  If you have questions about a medical condition or this instruction, always ask your healthcare professional. [employer] disclaims any warranty or liability for your use of this information. Head or Face Pain: Care Instructions Your Care Instructions Common causes of head or face pain are allergies, stress, and injuries. Other causes include tooth problems and sinus infections. Eating certain foods, such as chocolate or cheese, or drinking certain liquids, such as coffee or cola, can cause head pain for some people. If you have mild head pain, you may not need treatment. It is important to watch your symptoms and talk to your doctor if your pain continues or gets worse. Follow-up care is a key part of your treatment and safety. Be sure to make and go to all appointments, and call your doctor if you are having problems. It's also a good idea to know your test results and keep a list of the medicines you take. How can you care for yourself at home? · Take pain medicines exactly as directed. ¨ If the doctor gave you a prescription medicine for pain, take it as prescribed. ¨ If you are not taking a prescription pain medicine, ask your doctor if you can take an over-the-counter pain medicine. · Take it easy for the next few days or longer if you are not feeling well. · Use a warm, moist towel or heating pad set on low to relax tight muscles in your shoulder and neck. Have someone gently massage your neck and shoulders. · Put ice or a cold pack on the area for 10 to 20 minutes at a time. Put a thin cloth between the ice and your skin. When should you call for help? Call 911 anytime you think you may need emergency care. For example, call if: 
  · You have twitching, jerking, or a seizure.  
  · You passed out (lost consciousness).  
  · You have symptoms of a stroke. These may include: 
¨ Sudden numbness, tingling, weakness, or loss of movement in your face, arm, or leg, especially on only one side of your body. ¨ Sudden vision changes. ¨ Sudden trouble speaking. ¨ Sudden confusion or trouble understanding simple statements. ¨ Sudden problems with walking or balance. ¨ A sudden, severe headache that is different from past headaches.  
  · You have jaw pain and pain in your chest, shoulder, neck, or arm.  
 Call your doctor now or seek immediate medical care if: 
  · You have a fever with a stiff neck or a severe headache.  
  · You have nausea and vomiting, or you cannot keep food or liquids down.  
 Watch closely for changes in your health, and be sure to contact your doctor if: 
  · Your head or face pain does not get better as expected. Where can you learn more? Go to http://juniorLudic Labsmckayla.info/. Enter P568 in the search box to learn more about \"Head or Face Pain: Care Instructions. \" Current as of: November 20, 2017 Content Version: 11.7 © 1780-0922 [employer]. Care instructions adapted under license by [employer] (which disclaims liability or warranty for this information). If you have questions about a medical condition or this instruction, always ask your healthcare professional. [place] [address] any warranty or liability for your use of this information. Preventing Falls: Care Instructions Your Care Instructions Getting around your home safely can be a challenge if you have injuries or health problems that make it easy for you to fall. Loose rugs and furniture in walkways are among the dangers for many older people who have problems walking or who have poor eyesight. People who have conditions such as arthritis, osteoporosis, or dementia also have to be careful not to fall. You can make your home safer with a few simple measures. Follow-up care is a key part of your treatment and safety. Be sure to make and go to all appointments, and call your doctor if you are having problems. It's also a good idea to know your test results and keep a list of the medicines you take. How can you care for yourself at home? Taking care of yourself · You may get dizzy if you do not drink enough water. To prevent dehydration, drink plenty of fluids, enough so that your urine is light yellow or clear like water. Choose water and other caffeine-free clear liquids. If you have kidney, heart, or liver disease and have to limit fluids, talk with your doctor before you increase the amount of fluids you drink. · Exercise regularly to improve your strength, muscle tone, and balance. Walk if you can. Swimming may be a good choice if you cannot walk easily. · Have your vision and hearing checked each year or any time you notice a change. If you have trouble seeing and hearing, you might not be able to avoid objects and could lose your balance. · Know the side effects of the medicines you take. Ask your doctor or pharmacist whether the medicines you take can affect your balance. Sleeping pills or sedatives can affect your balance. · Limit the amount of alcohol you drink. Alcohol can impair your balance and other senses. · Ask your doctor whether calluses or corns on your feet need to be removed. If you wear loose-fitting shoes because of calluses or corns, you can lose your balance and fall. · Talk to your doctor if you have numbness in your feet. Preventing falls at home · Remove raised doorway thresholds, throw rugs, and clutter. Repair loose carpet or raised areas in the floor. · Move furniture and electrical cords to keep them out of walking paths. · Use nonskid floor wax, and wipe up spills right away, especially on ceramic tile floors. · If you use a walker or cane, put rubber tips on it. If you use crutches, clean the bottoms of them regularly with an abrasive pad, such as steel wool. · Keep your house well lit, especially [facility], and outside walkways. Use night-lights in areas such as hallways and bathrooms.  Add extra light switches or use remote switches (such as switches that go on or off when you clap your hands) to make it easier to turn lights on if you have to get up during the night. · Install sturdy handrails on stairways. · Move items in your cabinets so that the things you use a lot are on the lower shelves (about waist level). · Keep a cordless phone and a flashlight with new batteries by your bed. If possible, put a phone in each of the main rooms of your house, or carry a cell phone in case you fall and cannot reach a phone. Or, you can wear a device around your neck or wrist. You push a button that sends a signal for help. · Wear low-heeled shoes that fit well and give your feet good support. Use footwear with nonskid soles. Check the heels and soles of your shoes for wear. Repair or replace worn heels or soles. · Do not wear socks without shoes on wood floors. · Walk on the grass when the sidewalks are slippery. If you live in an area that gets snow and ice in the winter, sprinkle salt on slippery steps and sidewalks. Preventing falls in the bath · Install grab bars and nonskid mats inside and outside your shower or tub and near the toilet and sinks. · Use shower chairs and bath benches. · Use a hand-held shower head that will allow you to sit while showering. · Get into a tub or shower by putting the weaker leg in first. Get out of a tub or shower with your strong side first. 
· Repair loose toilet seats and consider installing a raised toilet seat to make getting on and off the toilet easier. · Keep your bathroom door unlocked while you are in the shower. Where can you learn more? Go to http://junior-mckayla.info/. Enter 0476 79 69 [id number] in the search box to learn more about \"Preventing Falls: Care Instructions. \" Current as of: May 12, 2017 Content Version: 11.7 © 8496-8783 [employer].  Care instructions adapted under license by [address] (which disclaims liability or warranty for this information). If you have questions about a medical condition or this instruction, always ask your healthcare professional. [address] any warranty or liability for your use of this information. Introducing [facility]! [employer] introduces Global CIO patient portal. Now you can access parts of your medical record, email your doctor's office, and request medication refills online. 1. In your internet browser, go to [email or web address] 2. Click on the First Time User? Click Here link in the Sign In box. You will see the New Member Sign Up page. 3. Enter your Global CIO Access Code exactly as it appears below. You will not need to use this code after youve completed the sign-up process. If you do not sign up before the expiration date, you must request a new code. · Global CIO Access Code: [email or web address] Expires: 8/15/2018  5:25 AM 
 
4. Enter the last four digits of your Social Security Number (xxxx) and Date of Birth (mm/dd/yyyy) as indicated and click Submit. You will be taken to the next sign-up page. 5. Create a Global CIO ID. This will be your Global CIO login ID and cannot be changed, so think of one that is secure and easy to remember. 6. Create a Global CIO password. You can change your password at any time. 7. Enter your Password Reset Question and Answer. This can be used at a later time if you forget your password. 8. Enter your e-mail address. You will receive e-mail notification when new information is available in [address] 9. Click Sign Up. You can now view and download portions of your medical record. 10. Click the Download Summary menu link to download a portable copy of your medical information. If you have questions, please visit the Frequently Asked Questions section of the Global CIO website.  Remember, Global CIO is NOT to be used for urgent needs. For medical emergencies, dial 911. Now available from your iPhone and Android! Please provide this summary of care documentation to your next provider. Your primary care clinician is listed as Vipul Hardin. If you have any questions after today's visit, please call [phone].

## 2018-07-26 NOTE — PROGRESS NOTES
Upon doing the Depression on screening on patient, patient scale reading is a 24. I made Dr. Tyrone Barnard aware of my finding.

## 2018-07-26 NOTE — TELEPHONE ENCOUNTER
Left a message on patient's voice mail that I have schedule her an appointment with Dr. Evelyn Dolan at [facility] for August 1,2018 at 2pm . Ask patient to arrive 20-30 minutes before her appointment time.

## 2018-07-27 PROBLEM — E11.21 TYPE 2 DIABETES WITH NEPHROPATHY (HCC): Status: ACTIVE | Noted: 2018-07-27

## 2018-07-27 RX ORDER — LEVOTHYROXINE SODIUM 175 UG/1
175 TABLET ORAL
Qty: 30 TAB | Refills: 5 | Status: SHIPPED | OUTPATIENT
Start: 2018-07-27 | End: 2018-07-27 | Stop reason: SDUPTHER

## 2018-07-27 RX ORDER — LEVOTHYROXINE SODIUM 175 UG/1
TABLET ORAL
Qty: 90 TAB | Refills: 5 | Status: SHIPPED | OUTPATIENT
Start: 2018-07-27 | End: 2020-04-06 | Stop reason: SDUPTHER

## 2018-07-27 NOTE — PROGRESS NOTES
Spoke to patient and informed her that her TSH was high. I will forward her labs results to her PCP and see if they need to adjust her synthroid.

## 2018-07-29 LAB
ALBUMIN SERPL-MCNC: 4.4 G/DL (ref 3.6–4.8)
ALBUMIN/GLOB SERPL: 1.8 {RATIO} (ref 1.2–2.2)
ALP SERPL-CCNC: 104 IU/L (ref 39–117)
ALT SERPL-CCNC: 18 IU/L (ref 0–32)
AMMONIA PLAS-MCNC: 37 UG/DL (ref 19–87)
AST SERPL-CCNC: 18 IU/L (ref 0–40)
BASOPHILS # BLD AUTO: 0 X10E3/UL (ref 0–0.2)
BASOPHILS NFR BLD AUTO: 1 %
BILIRUB SERPL-MCNC: 0.5 MG/DL (ref 0–1.2)
BUN SERPL-MCNC: 14 MG/DL (ref 8–27)
BUN/CREAT SERPL: 14 (ref 12–28)
CALCIUM SERPL-MCNC: 9.7 MG/DL (ref 8.7–10.3)
CHLORIDE SERPL-SCNC: 99 MMOL/L (ref 96–106)
CO2 SERPL-SCNC: 22 MMOL/L (ref 20–29)
CREAT SERPL-MCNC: 1.01 MG/DL (ref 0.57–1)
EOSINOPHIL # BLD AUTO: 0.3 X10E3/UL (ref 0–0.4)
EOSINOPHIL NFR BLD AUTO: 4 %
ERYTHROCYTE [DISTWIDTH] IN BLOOD BY AUTOMATED COUNT: 14.5 % (ref 12.3–15.4)
FOLATE SERPL-MCNC: 9.6 NG/ML
GLOBULIN SER CALC-MCNC: 2.5 G/DL (ref 1.5–4.5)
GLUCOSE SERPL-MCNC: 162 MG/DL (ref 65–99)
HCT VFR BLD AUTO: 43.3 % (ref 34–46.6)
HGB BLD-MCNC: 14.8 G/DL (ref 11.1–15.9)
IMM GRANULOCYTES # BLD: 0 X10E3/UL (ref 0–0.1)
IMM GRANULOCYTES NFR BLD: 0 %
LYMPHOCYTES # BLD AUTO: 1.5 X10E3/UL (ref 0.7–3.1)
LYMPHOCYTES NFR BLD AUTO: 20 %
MAGNESIUM SERPL-MCNC: 2 MG/DL (ref 1.6–2.3)
MCH RBC QN AUTO: 32.2 PG (ref 26.6–33)
MCHC RBC AUTO-ENTMCNC: 34.2 G/DL (ref 31.5–35.7)
MCV RBC AUTO: 94 FL (ref 79–97)
MONOCYTES # BLD AUTO: 0.6 X10E3/UL (ref 0.1–0.9)
MONOCYTES NFR BLD AUTO: 9 %
NEUTROPHILS # BLD AUTO: 5 X10E3/UL (ref 1.4–7)
NEUTROPHILS NFR BLD AUTO: 66 %
OXCARBAZEPINE SERPL-MCNC: 6 UG/ML (ref 10–35)
PLATELET # BLD AUTO: 209 X10E3/UL (ref 150–379)
POTASSIUM SERPL-SCNC: 4.7 MMOL/L (ref 3.5–5.2)
PROT SERPL-MCNC: 6.9 G/DL (ref 6–8.5)
RBC # BLD AUTO: 4.59 X10E6/UL (ref 3.77–5.28)
SODIUM SERPL-SCNC: 140 MMOL/L (ref 134–144)
TSH SERPL DL<=0.005 MIU/L-ACNC: 7.22 UIU/ML (ref 0.45–4.5)
VIT B12 SERPL-MCNC: 369 PG/ML (ref 232–1245)
WBC # BLD AUTO: 7.4 X10E3/UL (ref 3.4–10.8)

## 2018-07-31 ENCOUNTER — TELEPHONE (OUTPATIENT)
Dept: NEUROLOGY | Age: 63
End: 2018-07-31

## 2018-07-31 NOTE — TELEPHONE ENCOUNTER
----- Message from Kemal Beatty sent at 7/31/2018  1:46 PM EDT -----  Regarding: Dr. Khadra Mosley  Pt would like a call back from the nurse regarding the migraine medication, Oxcarbazepine. Pt is complaining of still having headaches after she takes the medication. Pt would like to know if the medication needs to be decreased or increased. Pt can be reached at [phone].

## 2018-07-31 NOTE — TELEPHONE ENCOUNTER
Dr. Gasper Falcon patient states that her migraine medication Oxcarbazepine she is still having headaches after she takes the medication. Patient wants to known if medication needs to be increased or decrease. Please advise.

## 2018-07-31 NOTE — TELEPHONE ENCOUNTER
Spoke with patient to let her known that I will send Dr. Antonieta Peña her concerns with her medication. I let patient known that Dr. Antonieta Peña is working in the hospital but he or nurse will give her a call back.

## 2018-08-01 ENCOUNTER — DOCUMENTATION ONLY (OUTPATIENT)
Dept: NEUROLOGY | Age: 63
End: 2018-08-01

## 2018-08-01 NOTE — PROGRESS NOTES
Faxed on 7/30/2018 to Dr. Yasmany Alfred Office at [phone] Referral Requisition, Clinic Notes, Insurance Card, Patient Registration.

## 2018-08-03 NOTE — TELEPHONE ENCOUNTER
Spoke to patient. Head and facial is better. Advised to increase Oxcarbazepine up to 150 mg, 2 BID if necessary. She understood.

## 2018-08-23 ENCOUNTER — TELEPHONE (OUTPATIENT)
Dept: NEUROLOGY | Age: 63
End: 2018-08-23

## 2018-08-23 NOTE — TELEPHONE ENCOUNTER
Please ask patient if she has seen her cardiologist since I saw her as we documented significant orthostatic hypotension in clinic which may cause her dizziness and vertigo.

## 2018-08-23 NOTE — TELEPHONE ENCOUNTER
Left message on patient voice mail that I will send her concerns regarding her vertigo to Dr. Esteban Bills.

## 2018-08-23 NOTE — TELEPHONE ENCOUNTER
----- Message from Mckinley Smith sent at 8/23/2018  1:09 PM EDT -----  Regarding: Dr. Mirtha Pak  Pt requesting to speak with the nurse in regards to vertigo. Pt stated, vertigo is not getting any better. Best contact number [phone].

## 2018-08-24 NOTE — TELEPHONE ENCOUNTER
Left message for patient on voice mail per Dr. Jeremie Fritz did you go to see the cardiologist? Please return call.

## 2018-08-26 DIAGNOSIS — R42 DIZZINESS: ICD-10-CM

## 2018-08-27 ENCOUNTER — TELEPHONE (OUTPATIENT)
Dept: NEUROLOGY | Age: 63
End: 2018-08-27

## 2018-08-27 NOTE — TELEPHONE ENCOUNTER
Dr. Martina Troncoso, spoke with patient and she states that she saw the cardiologist (Teresa Gotti) last week but patient does not have answer to the vertigo. Patient states that Dr. Allie Hernandez (ENT) told her to stop taking the meclizine. Please advise.

## 2018-08-27 NOTE — TELEPHONE ENCOUNTER
Please tell patient that we will obtain records from her cardiologist and ENT doctor. I review it and see what else I can do.

## 2018-08-28 NOTE — TELEPHONE ENCOUNTER
Left message for patient to see if she could come by the office and sign a medical release forms so we can get her records from Dr. Vinod Garcia, or patient can go by each doctor's offices and sign a medical release form to have the records sent here. Dr. Turner Burkitt needs to review the records to see how he can help.

## 2018-09-25 ENCOUNTER — OFFICE VISIT (OUTPATIENT)
Dept: FAMILY MEDICINE CLINIC | Age: 63
End: 2018-09-25

## 2018-09-25 ENCOUNTER — HOSPITAL ENCOUNTER (OUTPATIENT)
Dept: LAB | Age: 63
Discharge: HOME | End: 2018-09-25
Payer: MEDICARE

## 2018-09-25 ENCOUNTER — HOME HEALTH ADMISSION (OUTPATIENT)
Dept: HOME HEALTH SERVICES | Facility: HOME HEALTH | Age: 63
End: 2018-09-25
Payer: MEDICARE

## 2018-09-25 VITALS
HEIGHT: 67 IN | RESPIRATION RATE: 16 BRPM | WEIGHT: 242 LBS | TEMPERATURE: 98.2 F | OXYGEN SATURATION: 96 % | HEART RATE: 110 BPM | DIASTOLIC BLOOD PRESSURE: 82 MMHG | BODY MASS INDEX: 37.98 KG/M2 | SYSTOLIC BLOOD PRESSURE: 125 MMHG

## 2018-09-25 DIAGNOSIS — G89.29 CHRONIC PAIN OF LEFT KNEE: Primary | ICD-10-CM

## 2018-09-25 DIAGNOSIS — Z23 ENCOUNTER FOR IMMUNIZATION: ICD-10-CM

## 2018-09-25 DIAGNOSIS — E11.9 TYPE 2 DIABETES MELLITUS WITHOUT COMPLICATION, WITHOUT LONG-TERM CURRENT USE OF INSULIN (HCC): ICD-10-CM

## 2018-09-25 DIAGNOSIS — M25.562 CHRONIC PAIN OF LEFT KNEE: Primary | ICD-10-CM

## 2018-09-25 DIAGNOSIS — E03.9 ACQUIRED HYPOTHYROIDISM: ICD-10-CM

## 2018-09-25 DIAGNOSIS — E66.01 SEVERE OBESITY (BMI 35.0-39.9): ICD-10-CM

## 2018-09-25 DIAGNOSIS — I48.19 PERSISTENT ATRIAL FIBRILLATION (HCC): ICD-10-CM

## 2018-09-25 PROCEDURE — 84443 ASSAY THYROID STIM HORMONE: CPT

## 2018-09-25 PROCEDURE — 83036 HEMOGLOBIN GLYCOSYLATED A1C: CPT

## 2018-09-25 PROCEDURE — 85025 COMPLETE CBC W/AUTO DIFF WBC: CPT

## 2018-09-25 PROCEDURE — 80053 COMPREHEN METABOLIC PANEL: CPT

## 2018-09-25 PROCEDURE — 80061 LIPID PANEL: CPT

## 2018-09-25 NOTE — MR AVS SNAPSHOT
[address] [address] 
[phone] Patient: Grace Vidales MRN: [id number] UCX:[id number] Visit Information Date & Time Provider Department Dept. Phone Encounter #  
 9/25/2018  7:15 AM Candelaria Vale MD [address] [facility] [phone] [id number] Your Appointments 9/26/2018 11:40 AM  
Follow Up with Jose M Lechuga MD  
[facility] [address] [facility]) Appt Note: [address], 
[address] [place] [address], [address] [phone]  
[address], [address] [place] [address], [phone] Upcoming Health Maintenance Date Due Hepatitis C Screening 1955 HEMOGLOBIN A1C Q6M 1955 LIPID PANEL Q1 1955 FOOT EXAM Q1 5/4/1963 EYE EXAM RETINAL OR DILATED Q1 5/4/1963 COLONOSCOPY 5/4/1971 DTaP/Tdap/Td series (1 - Tdap) 5/4/1974 MEDICARE YEARLY EXAM 3/28/2018 GLAUCOMA SCREENING Q2Y 5/4/2018 Bone Densitometry (Dexa) Screening 5/4/2018 Pneumococcal 65+ Low/Medium Risk (1 of 2 - PCV13) 5/4/2018 Influenza Age 5 to Adult 8/1/2018 Shingrix Vaccine Age 50> (2 of 2) 8/4/2018 MICROALBUMIN Q1 7/18/2019 BREAST CANCER SCRN MAMMOGRAM 6/7/2020 Allergies as of 9/25/2018  Review Complete On: 9/25/2018 By: Candelaria Vale MD  
  
 Severity Noted Reaction Type Reactions Shellfish Derived High 07/12/2013    Anaphylaxis Ciprofloxacin  04/21/2016    Hives Theophylline  07/12/2013    Hives Current Immunizations  Reviewed on 6/11/2018 Name Date Influenza Vaccine (Tri) Adjuvanted  Incomplete Zoster Recombinant 6/9/2018 Not reviewed this visit You Were Diagnosed With   
  
 Codes Comments Chronic pain of left knee    -  Primary ICD-10-CM: M25.562, J87.14 ICD-9-CM: 719.46, 338.29   
 Encounter for immunization     ICD-10-CM: O86 ICD-9-CM: V03.89 Type 2 diabetes mellitus without complication, without long-term current use of insulin (HCC)     ICD-10-CM: E11.9 ICD-9-CM: 250.00 Persistent atrial fibrillation (HCC)     ICD-10-CM: I48.1 ICD-9-CM: 427.31 Severe obesity (BMI 35.0-39.9) (HCC)     ICD-10-CM: E66.01 
ICD-9-CM: 278.01 Acquired hypothyroidism     ICD-10-CM: E03.9 ICD-9-CM: 633. 9 Vitals BP Pulse Temp Resp Height(growth percentile) Weight(growth percentile) 125/82 (!) 110 98.2 °F (36.8 °C) (Oral) 16 5' 7\" (1.702 m) 242 lb (109.8 kg) SpO2 BMI OB Status Smoking Status 96% 37.9 kg/m2 Postmenopausal Never Smoker Vitals History BMI and BSA Data Body Mass Index Body Surface Area  
 37.9 kg/m 2 2.28 m 2 Preferred Pharmacy Pharmacy Name Phone [facility] [phone] Your Updated Medication List  
  
   
This list is accurate as of 9/25/18  7:55 AM.  Always use your most recent med list.  
  
  
  
  
 buPROPion  mg XL tablet Commonly known as:  [place] Karmen Take 300 mg by mouth. CRESTOR 40 mg tablet Generic drug:  rosuvastatin Take 40 mg by mouth nightly. cyclobenzaprine 5 mg tablet Commonly known as:  FLEXERIL Take 5 mg by mouth daily as needed. DULoxetine 60 mg capsule Commonly known as:  CYMBALTA Take 2 Caps by mouth daily. HYDROcodone-acetaminophen 5-325 mg per tablet Commonly known as:  Lizette Oyster Take 1 Tab by mouth two (2) times daily as needed. isosorbide mononitrate ER 30 mg tablet Commonly known as:  IMDUR Take  by mouth daily. JANUVIA 100 mg tablet Generic drug:  SITagliptin Take 100 mg by mouth daily. JARDIANCE 10 mg tablet Generic drug:  empagliflozin Take  by mouth daily. levothyroxine 175 mcg tablet Commonly known as:  SYNTHROID  
 TAKE 1 TABLET BY MOUTH DAILY BEFORE BREAKFAST LORazepam 1 mg tablet Commonly known as:  ATIVAN Please take one tablet at bedtime and one half tablet daily as needed  
  
 metoprolol tartrate 50 mg tablet Commonly known as:  LOPRESSOR Take  by mouth.  
  
 montelukast 10 mg tablet Commonly known as:  SINGULAIR Take 10 mg by mouth daily. ondansetron 4 mg disintegrating tablet Commonly known as:  ZOFRAN ODT Take 1 Tab by mouth every eight (8) hours as needed for Nausea. OXcarbazepine 150 mg tablet Commonly known as:  TRILEPTAL Take  by mouth. QVAR 80 mcg/actuation [facility] Generic drug:  beclomethasone Take 1 Puff by inhalation two (2) times a day. REQUIP 1 mg tablet Generic drug:  rOPINIRole Take  by mouth three (3) times daily. traZODone 150 mg tablet Commonly known as:  Tc Lipps Take 100-200 mg by mouth nightly. XARELTO 20 mg Tab tablet Generic drug:  rivaroxaban Take  by mouth daily. We Performed the Following ADMIN INFLUENZA VIRUS VAC [ [facility]] CBC WITH AUTOMATED DIFF [58820 CPT(R)] HEMOGLOBIN A1C WITH EAG [50076 CPT(R)] INFLUENZA VACCINE INACTIVATED (IIV), SUBUNIT, ADJUVANTED, IM O4124166 CPT(R)] LIPID PANEL [79952 CPT(R)] METABOLIC PANEL, COMPREHENSIVE [16228 CPT(R)] [address] Comments:  
 Please provide pt with a PT/OT evaluation due to worsening pain and limited mobility. Assess need for in home care aid. REFERRAL TO PAIN MANAGEMENT [NER997 Custom] TSH 3RD GENERATION [03065 CPT(R)] Referral Information Referral ID Referred By Referred To  
  
 [id number] [facility] [address] [facility] [address] [facility] Visits Status Start Date End Date 1 New Request 9/25/18 9/25/19  If your referral has a status of pending review or denied, additional information will be sent to support the outcome of this decision. Referral ID Referred By Referred To  
 [id number] Carlos Foreman [address] [place]  
   Óscar Benton, [address] Phone: [phone] Fax: [phone] Visits Status Start Date End Date 1 New Request 9/25/18 9/25/19 If your referral has a status of pending review or denied, additional information will be sent to support the outcome of this decision. Introducing [facility]! Kamla Tejeda introduces [employer] patient portal. Now you can access parts of your medical record, email your doctor's office, and request medication refills online. 1. In your internet browser, go to [email or web address] 2. Click on the First Time User? Click Here link in the Sign In box. You will see the New Member Sign Up page. 3. Enter your [employer] Access Code exactly as it appears below. You will not need to use this code after youve completed the sign-up process. If you do not sign up before the expiration date, you must request a new code. · [employer] Access Code: [email or web address] Expires: 12/24/2018  7:55 AM 
 
4. Enter the last four digits of your Social Security Number (xxxx) and Date of Birth (mm/dd/yyyy) as indicated and click Submit. You will be taken to the next sign-up page. 5. Create a [employer] ID. This will be your [employer] login ID and cannot be changed, so think of one that is secure and easy to remember. 6. Create a [employer] password. You can change your password at any time. 7. Enter your Password Reset Question and Answer. This can be used at a later time if you forget your password. 8. Enter your e-mail address. You will receive e-mail notification when new information is available in [address] 9. Click Sign Up. You can now view and download portions of your medical record. 10. Click the Download Summary menu link to download a portable copy of your medical information. If you have questions, please visit the Frequently Asked Questions section of the [employer] website. Remember, [employer] is NOT to be used for urgent needs. For medical emergencies, dial 911. Now available from your iPhone and Android! Please provide this summary of care documentation to your next provider. Your primary care clinician is listed as Vipul Hardin. If you have any questions after today's visit, please call [phone].

## 2018-09-25 NOTE — PROGRESS NOTES
Chief Complaint Patient presents with  Medication Evaluation Pain Management  Injection Flu Pt reports that she had a knee replacement 2 years ago, has had constant pain ever since. Pt went to a second ortho 2 weeks ago, who advised her that there was nothing that could be done. Pt is interested in pain management. Pt is currently taking tylenol. Pt was also advised that she had arthritis in her hips. Pt has been to pain management in the past, was not able to tolerate gabapentin. Pt has been taking Cymbalta with some relief. Pt reports that her insurance will pay for a care aid, needs a care plan. Pt reports that due to knee pain and use of a cane she is not able to drive, walk around or perform housework. Pt reports that her psychiatrist recently took her off of seroquel. Subjective: (As above and below) Chief Complaint Patient presents with  Medication Evaluation Pain Management  Injection Flu  
 
she is a 70y.o. year old female who presents for evaluation. Reviewed PmHx, RxHx, FmHx, SocHx, AllgHx and updated in chart. Review of Systems - negative except as listed above Objective:  
 
Vitals:  
 09/25/18 0724 BP: 125/82 Pulse: (!) 110 Resp: 16 Temp: 98.2 °F (36.8 °C) TempSrc: Oral  
SpO2: 96% Weight: 242 lb (109.8 kg) Height: 5' 7\" (1.702 m) Physical Examination: General appearance - alert, well appearing, and in no distress Mental status - normal mood, behavior, speech, dress, motor activity, and thought processes Mouth - mucous membranes moist, pharynx normal without lesions Chest - clear to auscultation, no wheezes, rales or rhonchi, symmetric air entry Heart - normal rate, regular rhythm, normal S1, S2, no murmurs, rubs, clicks or gallops Musculoskeletal - limited ROM left knee, walks with a cane Extremities - peripheral pulses normal, no pedal edema, no clubbing or cyanosis Assessment/ Plan: 1. Chronic pain of left knee 
-refer to pain management 
- REFERRAL TO [facility] 2. Encounter for immunization - Influenza Vaccine Inactivated (IIV)(FLUAD), Subunit, Adjuvanted, IM, (19823) - Administration fee () for Medicare insured patients 3. Type 2 diabetes mellitus without complication, without long-term current use of insulin ([facility] - METABOLIC PANEL, COMPREHENSIVE 
- CBC WITH AUTOMATED DIFF 
- LIPID PANEL 
- HEMOGLOBIN A1C WITH EAG 4. Persistent atrial fibrillation ([facility] 
-continue on Xarelto 5. Severe obesity (BMI 35.0-39.9) ([facility] 
-continue to work on diet and exercise 6. Acquired hypothyroidism 
- TSH 3RD GENERATION Follow-up Disposition: As needed I have discussed the diagnosis with the patient and the intended plan as seen in the above orders. The patient has received an after-visit summary and questions were answered concerning future plans. Medication Side Effects and Warnings were discussed with patient: yes Patient Labs were reviewed: yes Patient Past Records were reviewed:  yes Dg Storey M.D.

## 2018-09-26 ENCOUNTER — OFFICE VISIT (OUTPATIENT)
Dept: NEUROLOGY | Age: 63
End: 2018-09-26

## 2018-09-26 VITALS
BODY MASS INDEX: 37.42 KG/M2 | OXYGEN SATURATION: 98 % | WEIGHT: 238.4 LBS | SYSTOLIC BLOOD PRESSURE: 148 MMHG | DIASTOLIC BLOOD PRESSURE: 80 MMHG | HEART RATE: 99 BPM | HEIGHT: 67 IN

## 2018-09-26 DIAGNOSIS — G25.81 RESTLESS LEG SYNDROME: ICD-10-CM

## 2018-09-26 DIAGNOSIS — G25.0 ESSENTIAL TREMOR: ICD-10-CM

## 2018-09-26 DIAGNOSIS — G50.1 ATYPICAL FACIAL PAIN: Primary | ICD-10-CM

## 2018-09-26 DIAGNOSIS — R42 DIZZINESS: ICD-10-CM

## 2018-09-26 LAB
ALBUMIN SERPL-MCNC: 4.4 G/DL (ref 3.6–4.8)
ALBUMIN/GLOB SERPL: 2.6 {RATIO} (ref 1.2–2.2)
ALP SERPL-CCNC: 99 IU/L (ref 39–117)
ALT SERPL-CCNC: 19 IU/L (ref 0–32)
AST SERPL-CCNC: 17 IU/L (ref 0–40)
BASOPHILS # BLD AUTO: 0 X10E3/UL (ref 0–0.2)
BASOPHILS NFR BLD AUTO: 0 %
BILIRUB SERPL-MCNC: 0.3 MG/DL (ref 0–1.2)
BUN SERPL-MCNC: 16 MG/DL (ref 8–27)
BUN/CREAT SERPL: 18 (ref 12–28)
CALCIUM SERPL-MCNC: 9.4 MG/DL (ref 8.7–10.3)
CHLORIDE SERPL-SCNC: 97 MMOL/L (ref 96–106)
CHOLEST SERPL-MCNC: 147 MG/DL (ref 100–199)
CO2 SERPL-SCNC: 19 MMOL/L (ref 20–29)
CREAT SERPL-MCNC: 0.9 MG/DL (ref 0.57–1)
EOSINOPHIL # BLD AUTO: 0.2 X10E3/UL (ref 0–0.4)
EOSINOPHIL NFR BLD AUTO: 3 %
ERYTHROCYTE [DISTWIDTH] IN BLOOD BY AUTOMATED COUNT: 14.3 % (ref 12.3–15.4)
EST. AVERAGE GLUCOSE BLD GHB EST-MCNC: 235 MG/DL
GLOBULIN SER CALC-MCNC: 1.7 G/DL (ref 1.5–4.5)
GLUCOSE SERPL-MCNC: 378 MG/DL (ref 65–99)
HBA1C MFR BLD: 9.8 % (ref 4.8–5.6)
HCT VFR BLD AUTO: 42 % (ref 34–46.6)
HDLC SERPL-MCNC: 56 MG/DL
HGB BLD-MCNC: 13.5 G/DL (ref 11.1–15.9)
IMM GRANULOCYTES # BLD: 0 X10E3/UL (ref 0–0.1)
IMM GRANULOCYTES NFR BLD: 0 %
INTERPRETATION, 910389: NORMAL
LDLC SERPL CALC-MCNC: 51 MG/DL (ref 0–99)
LYMPHOCYTES # BLD AUTO: 1.4 X10E3/UL (ref 0.7–3.1)
LYMPHOCYTES NFR BLD AUTO: 20 %
Lab: NORMAL
MCH RBC QN AUTO: 32.5 PG (ref 26.6–33)
MCHC RBC AUTO-ENTMCNC: 32.1 G/DL (ref 31.5–35.7)
MCV RBC AUTO: 101 FL (ref 79–97)
MONOCYTES # BLD AUTO: 0.7 X10E3/UL (ref 0.1–0.9)
MONOCYTES NFR BLD AUTO: 10 %
NEUTROPHILS # BLD AUTO: 4.4 X10E3/UL (ref 1.4–7)
NEUTROPHILS NFR BLD AUTO: 67 %
PLATELET # BLD AUTO: 163 X10E3/UL (ref 150–379)
POTASSIUM SERPL-SCNC: 4.2 MMOL/L (ref 3.5–5.2)
PROT SERPL-MCNC: 6.1 G/DL (ref 6–8.5)
RBC # BLD AUTO: 4.15 X10E6/UL (ref 3.77–5.28)
SODIUM SERPL-SCNC: 134 MMOL/L (ref 134–144)
TRIGL SERPL-MCNC: 200 MG/DL (ref 0–149)
TSH SERPL DL<=0.005 MIU/L-ACNC: 2.3 UIU/ML (ref 0.45–4.5)
VLDLC SERPL CALC-MCNC: 40 MG/DL (ref 5–40)
WBC # BLD AUTO: 6.7 X10E3/UL (ref 3.4–10.8)

## 2018-09-26 RX ORDER — BENZONATATE 100 MG/1
100 CAPSULE ORAL
COMMUNITY
End: 2019-12-13

## 2018-09-26 RX ORDER — AMOXICILLIN 500 MG/1
TABLET, FILM COATED ORAL
COMMUNITY
End: 2018-09-27 | Stop reason: CLARIF

## 2018-09-26 RX ORDER — MONTELUKAST SODIUM 4 MG/1
1 TABLET, CHEWABLE ORAL 2 TIMES DAILY
COMMUNITY
End: 2020-04-04

## 2018-09-26 NOTE — PATIENT INSTRUCTIONS
A Healthy Lifestyle: Care Instructions  Your Care Instructions    A healthy lifestyle can help you feel good, stay at a healthy weight, and have plenty of energy for both work and play. A healthy lifestyle is something you can share with your whole family. A healthy lifestyle also can lower your risk for serious health problems, such as high blood pressure, heart disease, and diabetes. You can follow a few steps listed below to improve your health and the health of your family. Follow-up care is a key part of your treatment and safety. Be sure to make and go to all appointments, and call your doctor if you are having problems. It's also a good idea to know your test results and keep a list of the medicines you take. How can you care for yourself at home? · Do not eat too much sugar, fat, or fast foods. You can still have dessert and treats now and then. The goal is moderation. · Start small to improve your eating habits. Pay attention to portion sizes, drink less juice and soda pop, and eat more fruits and vegetables. ¨ Eat a healthy amount of food. A 3-ounce serving of meat, for example, is about the size of a deck of cards. Fill the rest of your plate with vegetables and whole grains. ¨ Limit the amount of soda and sports drinks you have every day. Drink more water when you are thirsty. ¨ Eat at least 5 servings of fruits and vegetables every day. It may seem like a lot, but it is not hard to reach this goal. A serving or helping is 1 piece of fruit, 1 cup of vegetables, or 2 cups of leafy, raw vegetables. Have an apple or some carrot sticks as an afternoon snack instead of a candy bar. Try to have fruits and/or vegetables at every meal.  · Make exercise part of your daily routine. You may want to start with simple activities, such as walking, bicycling, or slow swimming. Try to be active 30 to 60 minutes every day. You do not need to do all 30 to 60 minutes all at once.  For example, you can exercise 3 times a day for 10 or 20 minutes. Moderate exercise is safe for most people, but it is always a good idea to talk to your doctor before starting an exercise program.  · Keep moving. Gayla Montaño the lawn, work in the garden, or [employer]. Take the stairs instead of the elevator at work. · If you smoke, quit. People who smoke have an increased risk for heart attack, stroke, cancer, and other lung illnesses. Quitting is hard, but there are ways to boost your chance of quitting tobacco for good. ¨ Use nicotine gum, patches, or lozenges. ¨ Ask your doctor about stop-smoking programs and medicines. ¨ Keep trying. In addition to reducing your risk of diseases in the future, you will notice some benefits soon after you stop using tobacco. If you have shortness of breath or asthma symptoms, they will likely get better within a few weeks after you quit. · Limit how much alcohol you drink. Moderate amounts of alcohol (up to 2 drinks a day for men, 1 drink a day for women) are okay. But drinking too much can lead to liver problems, high blood pressure, and other health problems. Family health  If you have a family, there are many things you can do together to improve your health. · Eat meals together as a family as often as possible. · Eat healthy foods. This includes fruits, vegetables, lean meats and dairy, and whole grains. · Include your family in your fitness plan. Most people think of activities such as jogging or tennis as the way to fitness, but there are many ways you and your family can be more active. Anything that makes you breathe hard and gets your heart pumping is exercise. Here are some tips:  ¨ Walk to do errands or to take your child to school or the bus. ¨ Go for a family bike ride after dinner instead of watching TV. Where can you learn more? Go to http://junior-mckayla.info/. Enter V313 in the search box to learn more about \"A Healthy Lifestyle: Care Instructions. \"  Current as of: December 7, 2017  Content Version: 11.7  © 9944-1679 [employer]. Care instructions adapted under license by [employer] (which disclaims liability or warranty for this information). If you have questions about a medical condition or this instruction, always ask your healthcare professional. [address] any warranty or liability for your use of this information. Please be advised there is a $25 fee for all paperwork to be completed from our  providers. This is to be paid by the patient prior to picking up the completed forms. Head or Face Pain: Care Instructions  Your Care Instructions    Common causes of head or face pain are allergies, stress, and injuries. Other causes include tooth problems and sinus infections. Eating certain foods, such as chocolate or cheese, or drinking certain liquids, such as coffee or cola, can cause head pain for some people. If you have mild head pain, you may not need treatment. It is important to watch your symptoms and talk to your doctor if your pain continues or gets worse. Follow-up care is a key part of your treatment and safety. Be sure to make and go to all appointments, and call your doctor if you are having problems. It's also a good idea to know your test results and keep a list of the medicines you take. How can you care for yourself at home? · Take pain medicines exactly as directed. ¨ If the doctor gave you a prescription medicine for pain, take it as prescribed. ¨ If you are not taking a prescription pain medicine, ask your doctor if you can take an over-the-counter pain medicine. · Take it easy for the next few days or longer if you are not feeling well. · Use a warm, moist towel or heating pad set on low to relax tight muscles in your shoulder and neck. Have someone gently massage your neck and shoulders. · Put ice or a cold pack on the area for 10 to 20 minutes at a time.  Put a thin cloth between the ice and your skin. When should you call for help? Call 911 anytime you think you may need emergency care. For example, call if:    · You have twitching, jerking, or a seizure.     · You passed out (lost consciousness).     · You have symptoms of a stroke. These may include:  ¨ Sudden numbness, tingling, weakness, or loss of movement in your face, arm, or leg, especially on only one side of your body. ¨ Sudden vision changes. ¨ Sudden trouble speaking. ¨ Sudden confusion or trouble understanding simple statements. ¨ Sudden problems with walking or balance. ¨ A sudden, severe headache that is different from past headaches.     · You have jaw pain and pain in your chest, shoulder, neck, or arm.    Call your doctor now or seek immediate medical care if:    · You have a fever with a stiff neck or a severe headache.     · You have nausea and vomiting, or you cannot keep food or liquids down.    Watch closely for changes in your health, and be sure to contact your doctor if:    · Your head or face pain does not get better as expected. Where can you learn more? Go to http://juniorNeighborGoodsmckayla.info/. Enter P568 in the search box to learn more about \"Head or Face Pain: Care Instructions. \"  Current as of: November 20, 2017  Content Version: 11.7  © 4611-1243 [employer]. Care instructions adapted under license by [employer] (which disclaims liability or warranty for this information). If you have questions about a medical condition or this instruction, always ask your healthcare professional. [place] [address] any warranty or liability for your use of this information. Restless Legs Syndrome: Care Instructions  Your Care Instructions  Restless legs syndrome is a common nervous system problem. People with this syndrome feel a creeping, achy, or unpleasant feeling in the legs and an overpowering urge to move them.  It often occurs in the evening and at night and can lead to sleep problems and tiredness. Your doctor may suggest doing a study of your sleep patterns to figure out what is happening when you try to sleep. Many people get relief from symptoms when they get regular exercise, eat well, and avoid caffeine, alcohol, and tobacco.  Follow-up care is a key part of your treatment and safety. Be sure to make and go to all appointments, and call your doctor if you are having problems. It's also a good idea to know your test results and keep a list of the medicines you take. How can you care for yourself at home? · Take your medicines exactly as prescribed. Call your doctor if you think you are having a problem with your medicine. · Try bathing in hot or cold water. Applying a heating pad or ice bag to your legs may also help symptoms. · Stretch and massage your legs before bed or when discomfort begins. · Get some exercise for at least 30 minutes a day on most days of the week. Stop exercising at least 3 hours before bedtime. · Try to plan for situations where you will need to remain seated for long stretches. For example, if you are traveling by car, plan some stops so you can get out and walk around. · Tell your doctor about any medicines you are taking. This includes all over-the-counter, prescription, and herbal medicines. Some medicines, such as antidepressants, antihistamines, and cold and sinus medicines, can make your symptoms worse. · Avoid caffeine products, such as coffee, tea, cola, and chocolate. Caffeine can interrupt your sleep and stimulate you. · Do not smoke. Nicotine can make restless legs worse. If you need help quitting, talk to your doctor about stop-smoking programs and medicines. These can increase your chances of quitting for good. · Do not drink alcohol late in the evening. Take steps to help you sleep better  · Get plenty of sunlight in the outdoors, particularly later in the afternoon.   · Use the evening hours for settling down. Avoid activities that challenge you in the hours before bedtime. · Eat meals at regular times, and do not snack before bedtime. · Keep your bedroom quiet, dark, and cool. Try using a sleep mask and earplugs to help you sleep. · Limit how much you drink at night to reduce your need to get up to urinate. But do not go to bed thirsty. · Run a fan or other steady \"white noise\" during the night if noises wake you up. · [place] the bed for sleeping and sex. Do your reading or TV watching in another room. · Once you are in bed, relax from head to toe, and guide your mind to pleasant thoughts. · Do not stay in bed longer than 8 hours, and try to avoid naps. · If your symptoms usually improve around 4 a.m. to 6 a.m., try going to bed later than usual or allowing extra time for sleeping in to help you get the rest you need. When should you call for help? Watch closely for changes in your health, and be sure to contact your doctor if:    · You are still not getting enough sleep.     · Your symptoms become more severe or happen more often. Where can you learn more? Go to http://junior-mckayla.info/. Enter X836 in the search box to learn more about \"Restless Legs Syndrome: Care Instructions. \"  Current as of: October 9, 2017  Content Version: 11.7  © 5618-2022 [employer]. Care instructions adapted under license by [employer] (which disclaims liability or warranty for this information). If you have questions about a medical condition or this instruction, always ask your healthcare professional. [address] any warranty or liability for your use of this information. Lightheadedness or Faintness: Care Instructions  Your Care Instructions  Lightheadedness is a feeling that you are about to faint or \"pass out. \" You do not feel as if you or your surroundings are moving.  It is different from vertigo, which is the feeling that you or things around you are spinning or tilting. Lightheadedness usually goes away or gets better when you lie down. If lightheadedness gets worse, it can lead to a fainting spell. It is common to feel lightheaded from time to time. Lightheadedness usually is not caused by a serious problem. It often is caused by a short-lasting drop in blood pressure and blood flow to your head that occurs when you get up too quickly from a seated or lying position. Follow-up care is a key part of your treatment and safety. Be sure to make and go to all appointments, and call your doctor if you are having problems. It's also a good idea to know your test results and keep a list of the medicines you take. How can you care for yourself at home? · Lie down for 1 or 2 minutes when you feel lightheaded. After lying down, sit up slowly and remain sitting for 1 to 2 minutes before slowly standing up. · Avoid movements, positions, or activities that have made you lightheaded in the past.  · Get plenty of rest, especially if you have a cold or flu, which can cause lightheadedness. · Make sure you drink plenty of fluids, especially if you have a fever or have been sweating. · Do not drive or put yourself and others in danger while you feel lightheaded. When should you call for help? Call 911 anytime you think you may need emergency care. For example, call if:    · You have symptoms of a stroke. These may include:  ¨ Sudden numbness, tingling, weakness, or loss of movement in your face, arm, or leg, especially on only one side of your body. ¨ Sudden vision changes. ¨ Sudden trouble speaking. ¨ Sudden confusion or trouble understanding simple statements. ¨ Sudden problems with walking or balance. ¨ A sudden, severe headache that is different from past headaches.     · You have symptoms of a heart attack. These may include:  ¨ Chest pain or pressure, or a strange feeling in the chest.  ¨ Sweating. ¨ Shortness of breath.   ¨ Nausea or vomiting. ¨ Pain, pressure, or a strange feeling in the back, neck, jaw, or upper belly or in one or both shoulders or arms. ¨ Lightheadedness or sudden weakness. ¨ A fast or irregular heartbeat. After you call 911, the  may tell you to chew 1 adult-strength or 2 to 4 low-dose aspirin. Wait for an ambulance. Do not try to drive yourself.    Watch closely for changes in your health, and be sure to contact your doctor if:    · Your lightheadedness gets worse or does not get better with home care. Where can you learn more? Go to http://junior-mckayla.info/. Enter L754 in the search box to learn more about \"Lightheadedness or Faintness: Care Instructions. \"  Current as of: November 20, 2017  Content Version: 11.7  © 0332-0962 [employer]. Care instructions adapted under license by [employer] (which disclaims liability or warranty for this information). If you have questions about a medical condition or this instruction, always ask your healthcare professional. [place] [address] any warranty or liability for your use of this information.

## 2018-09-26 NOTE — PROGRESS NOTES
NEUROLOGY CLINIC NOTE    Patient ID:  Reyes Orourke  [id number]  57 y.o.  1955    Date of Visit:  September 26, 2018    Reason for Consultation:  Facial pain    Chief Complaint   Patient presents with    Follow-up     Dizziness       History of Present Illness:     Patient Active Problem List    Diagnosis Date Noted    Acquired hypothyroidism 09/25/2018    Type 2 diabetes with nephropathy ([facility] 07/27/2018    Severe obesity (BMI 35.0-39.9) ([facility] 07/26/2018    Asthma 06/30/2018    Diabetes ([facility]     Factor V Leiden ([facility]     A-fib ([facility]     Supraorbital headache 10/07/2015    Isthmica nodosa salpingitis 07/12/2013    Anxiety 07/12/2013    Essential and other specified forms of tremor 07/12/2013     Past Medical History:   Diagnosis Date    A-fib [facility])     Anxiety 7/12/2013    Arrhythmia     \"fast heart rate\". Ablation x2    Arthritis     Asthma     Depression     Diabetes ([facility]     Factor V Leiden ([facility]     Isthmica nodosa salpingitis 7/12/2013    Thyroid disease       Past Surgical History:   Procedure Laterality Date    ABDOMEN SURGERY PROC UNLISTED      cholecystectomy    CARDIAC SURG PROCEDURE UNLIST      ablation 2010 x2    HX CHOLECYSTECTOMY      HX HEENT      HX PACEMAKER        Prior to Admission medications    Medication Sig Start Date End Date Taking? Authorizing Provider   amoxicillin 500 mg tab Take  by mouth. Yes Historical Provider   benzonatate (TESSALON) 100 mg capsule Take 100 mg by mouth three (3) times daily as needed for Cough. Yes Historical Provider   colestipol (COLESTID) 1 gram tablet Take 1 g by mouth two (2) times a day. Yes Historical Provider   metoprolol tartrate (LOPRESSOR) 50 mg tablet Take  by mouth. Yes Historical Provider   LORazepam (ATIVAN) 1 mg tablet Please take one tablet at bedtime and one half tablet daily as needed 7/18/18  Yes Ban Hardin MD   buPROPion XL (WELLBUTRIN XL) 300 mg XL tablet Take 300 mg by mouth.    Yes Historical Provider   traZODone (DESYREL) 150 mg tablet Take 100-200 mg by mouth nightly. Yes Historical Provider   cyclobenzaprine (FLEXERIL) 5 mg tablet Take 5 mg by mouth daily as needed. 3/6/18  Yes Historical Provider   HYDROcodone-acetaminophen (NORCO) 5-325 mg per tablet Take 1 Tab by mouth two (2) times daily as needed. 4/21/16  Yes Historical Provider   ondansetron (ZOFRAN ODT) 4 mg disintegrating tablet Take 1 Tab by mouth every eight (8) hours as needed for Nausea. 7/18/18  Yes Ramonita Hardin MD   DULoxetine (CYMBALTA) 60 mg capsule Take 2 Caps by mouth daily. 7/18/18  Yes Ramonita Hardin MD   SITagliptin (JANUVIA) 100 mg tablet Take 100 mg by mouth daily. Yes Historical Provider   rivaroxaban (XARELTO) 20 mg tab tablet Take  by mouth daily. Yes Historical Provider   empagliflozin (JARDIANCE) 10 mg tablet Take  by mouth daily. Yes Historical Provider   OXcarbazepine (TRILEPTAL) 150 mg tablet Take  by mouth. Yes Historical Provider   isosorbide mononitrate ER (IMDUR) 30 mg tablet Take  by mouth daily. Yes Historical Provider   rosuvastatin (CRESTOR) 40 mg tablet Take 40 mg by mouth nightly. Yes Historical Provider   montelukast (SINGULAIR) 10 mg tablet Take 10 mg by mouth daily. Yes Historical Provider   rOPINIRole (REQUIP) 1 mg tablet Take  by mouth three (3) times daily. Yes Historical Provider   beclomethasone (QVAR) 80 mcg/actuation aero Take 1 Puff by inhalation two (2) times a day.    Yes Historical Provider   levothyroxine (SYNTHROID) 175 mcg tablet TAKE 1 TABLET BY MOUTH DAILY BEFORE BREAKFAST 7/27/18   Ramonita Hardin MD     Allergies   Allergen Reactions    Shellfish Derived Anaphylaxis    Ciprofloxacin Hives    Theophylline Hives      Social History   Substance Use Topics    Smoking status: Never Smoker    Smokeless tobacco: Never Used    Alcohol use No      Family History   Problem Relation Age of Onset    Heart Disease Father     Cancer Maternal Grandfather         Subjective:      Chaya Whitaker is a 70 y.o. TETZ with multiple medical problems who is being seen by neurology for a typical facial pain, essential tremors and restless leg syndrome who is here to establish her care. Patient was previously seen at [facility] neurology. Per patient she would only have headaches with a sinus infections. No known triggers. No new medications or adjustments were made. Gradual onset. Located left mid eyebrow and radiates up the left forehead. Sharp, ache or burn, sometimes a pressure. Rangers from 1 to 7/10. It would lasts from 30 minutes to one hour but can recur for as long as 2 weeks. Associated with light and noise sensitivity. No known precipitants. Light or noise makes it worse. Sleeping helps. She does have pain free periods for 1 to 2 weeks. Site is tender and sensitive to touch. She saw Dr. Clearence Sicard (neurologist) 9/25/2015 and was referred to Dr. Rafael Ordaz for supraorbital nerve block. She was seen 10/7/2015 and did received the nerve block. It offered relief but then recurred. She has had two 7/10 exacerbations. One was last 10/13/2015 and she was seen at [facility] ER. Head CT done was normal compared to one done 9/10/2013. Labs revealed mild hypokalemia. Treated with IV fluids and medications. Discharged on Tramadol. Next episode was 12/11/15. That episode was different in that pain started to radiate to the left zygomaticus area and temple. Seen at [facility]. Treated with IV fluids and medications. Discharged on [id number]. She does have pain free periods for 1 to 2 weeks. Site is tender and sensitive to touch. Last episode of facial pain was a week PTC. She reports no issues with sleep. Sleep 8 to 9 hours a night. Wakes up feeling rested. No daytime sleepiness. No vivid dreams. She does mention issue with postural and intentional intermittent hand tremors. She was seeing Dr. Monalisa Dasilva for it and was being treated with Topiramate.  She is currently taking 200 mg at bedtime. She has not seen any improvement of the intermittent tremors. Tremors occurred while she was taking her psychiatric medications. 7/17/2017, labs done revealed elevated glucose, slightly elevated creatinine, low GFR and elevated ALT. None detected oxcarbazepine level. Patient in the interim was seen by Dr. Amaris Bower (neurologist) last 5/18/2018. His plan was to discontinue gabapentin and Requip and patient to start Lyrica 75 mg at bedtime. Brain MRI was also ordered which per patient was done at [facility] and reported to be unremarkable. Patient also in the interim was admitted [facility] last April 2018 for chest pain. Patient was seen by cardiology 4/16/2018  Dr. Julissa Paredes who wanted to obtain a cardiac MRI pending okay due to bladder stimulator. Patient had an event monitor placed and was read on 4/25/2018 with no evidence of atrial fibrillation and rare PVCs. Patient reports last 3/9/2018 she was having workup for spinal stimulator. She then developed sudden onset of dizziness and vertigo. Patient was seen at [facility] ER. Head CT and labs were reported to be unremarkable. Discharge to home. Patient was then seen by Jamia Romero, ENT and found to have left hearing loss and prescribed meclizine which offered no benefit. Since the last visit on 7/26/2018, labs done were unremarkable (CBC, CMP, B12, folate, magnesium and ammonia) except for elevated TSH at 7.22. Oxcarbazepine level was 6 which is low. Patient continues to report no recurrence of her migraine headaches or facial pain. No severe pain left temple radiating to her left eye. She continues to take Oxcarbazepine 150 mg BID. Denies any side effects. Her hand tremors continue be better. Leg jerking or restlessness at night continue to respond to Ropinirole 1 mg 2 tablets at night. Her psychiatrist stopped Seroquel. She continues to use her CPAP for her KATELYNN. Dizziness is episodic and not any worse.  She is having knee pain and saw Orthopedics. Told no surgery is needed yet. Saw a back specialist and told she had spinal stenosis. Bone scan revealed osteoarthritis both hips. Review of records from [address] [place] [address] revealed recent labs done showing elevated hgbA1c at 9.8 and . Outside reports reviewed: office notes, labs    Review of Systems:    A comprehensive review of systems was negative except for:   Constitutional: positive for fatigue, chills, poor appetite, night sweats  Eyes: positive for none  Ears, nose, mouth, throat, and face: positive for hearing loss   Respiratory: positive for chronic cough, shortness of breath   Cardiovascular: positive for chest pain, palpitation   Gastrointestinal: positive for vomiting   Genitourinary: positive for increased urinary frequency   Integument/breast: positive for none  Hematologic/lymphatic: positive for none   Musculoskeletal: positive for joint pain, back pain    Neurological: positive for dizziness, numbness, gait instability  Behavioral/Psych: positive for depression, anxiety  Endocrine: positive for cold intolerance, increased thirst  Allergic/Immunologic: positive for none      Objective:     Visit Vitals    /80    Pulse 99    Ht 5' 7\" (1.702 m)    Wt 238 lb 6.4 oz (108.1 kg)    SpO2 98%    BMI 37.34 kg/m2     6/10 generalized pain    PHYSICAL EXAM:    NEUROLOGICAL EXAM:    Appearance: The patient is obese, provides a coherent history and is in no acute distress. Mental Status: Oriented to time, place and person. Fluent, no aphasia or dysarthria. Flat affect. Cranial Nerves:   II - XII were intact. Motor:  5/5 strength in upper and lower proximal and distal muscles. Normal bulk and tone. No fasciculations. Reflexes:   Deep tendon reflexes 1+/4 and symmetrical.    Sensory:   Normal to touch, pinprick and vibration. Gait:   Steady. No Romberg. Tremor:   No tremors noted. Cerebellar:  Intact FTN/ABHAY/HTS. Neurovascular:  No carotid bruits. Assessment:   Atypical facial pain  Dizziness  Essential tremor  Restless leg syndrome    Plan:   Neurological examination is non-focal. There is no left CN V sensory deficit seen. This is not uncommon for trigeminal neuralgia or supraorbital neuralgia. No indication yet to do another neuroimaging. Previous head CT done twice was reported to be normal. Type of pain is not typical for migraine headaches. Possible cluster headache. Continue Oxcarbazepine 150 mg daily and up to BID if necessary. Other options include AED (Lyrica, Depakote and etc.), TCA's (Amitriptyline and etc.) and etc. Potential supraorbital or trial of occipital nerve blocks if condition worsens. Exam does not reveal any brainstem or cerebellar dysfunction. Suspect because of dizzy spells is not primarily neurological but likely due to her medical condition or medications. Better today. No obvious tremor seen today. Medication induced or worsened by previous psychiatric medication. No features typically seen in Parkinson's disease. No upper motor neuron findings. Likely with some elements of essential tremors. Potential treatment options if worsens include Propranolol, Primidone and Benzodiazepines. Monitor for now given improvement. Complaint is suspicious for RLS and per patient it was recorded during her sleep study. Likely secondary RLS due to potential side effect from psychiatric medications. Improved with Ropinirole. Other potential options include Sinemet, other dopamine agonists and long acting Gabapentin. All questions and concerns were answered. Visit lasted 25 minutes.   Greater than 50% was spent discussing her condition, potential etiologies, review of medical records, lab results, fall precautions, treatment options and medication

## 2018-09-26 NOTE — MR AVS SNAPSHOT
[facility] [address], 
[id number], [address] [facility] 
[phone] Patient: Katherine Tony MRN: [id number] KTF:[birth year] Visit Information Date & Time Provider Department Dept. Phone Encounter #  
 9/26/2018 11:40 AM Kizzy Collazo MD [facility] at [facility] [phone] [id number] Follow-up Instructions Return in about 6 months (around 3/26/2019) for facial pain, tremors. Upcoming Health Maintenance Date Due Hepatitis C Screening 1955 HEMOGLOBIN A1C Q6M 1955 LIPID PANEL Q1 1955 FOOT EXAM Q1 5/4/1963 EYE EXAM RETINAL OR DILATED Q1 5/4/1963 COLONOSCOPY 5/4/1971 DTaP/Tdap/Td series (1 - Tdap) 5/4/1974 MEDICARE YEARLY EXAM 3/28/2018 GLAUCOMA SCREENING Q2Y 5/4/2018 Bone Densitometry (Dexa) Screening 5/4/2018 Pneumococcal 65+ Low/Medium Risk (1 of 2 - PCV13) 5/4/2018 Shingrix Vaccine Age 50> (2 of 2) 8/4/2018 MICROALBUMIN Q1 7/18/2019 BREAST CANCER SCRN MAMMOGRAM 6/7/2020 Allergies as of 9/26/2018  Review Complete On: 9/26/2018 By: Ly Callahan Severity Noted Reaction Type Reactions Shellfish Derived High 07/12/2013    Anaphylaxis Ciprofloxacin  04/21/2016    Hives Theophylline  07/12/2013    Hives Current Immunizations  Reviewed on 6/11/2018 Name Date Influenza Vaccine (Tri) Adjuvanted 9/25/2018 Zoster Recombinant 6/9/2018 Not reviewed this visit You Were Diagnosed With   
  
 Codes Comments Atypical facial pain    -  Primary ICD-10-CM: G50.1 ICD-9-CM: 350.2 Dizziness     ICD-10-CM: B97 ICD-9-CM: 780.4 Essential tremor     ICD-10-CM: G25.0 ICD-9-CM: 333.1 Restless leg syndrome     ICD-10-CM: G25.81 ICD-9-CM: 333.94 Vitals BP Pulse Height(growth percentile) Weight(growth percentile) SpO2 BMI  
 148/80 99 5' 7\" (1.702 m) 238 lb 6.4 oz (108.1 kg) 98% 37.34 kg/m2 OB Status Smoking Status Postmenopausal Never Smoker Vitals History BMI and BSA Data Body Mass Index Body Surface Area  
 37.34 kg/m 2 2.26 m 2 Preferred Pharmacy Pharmacy Name Phone [facility] [phone] Your Updated Medication List  
  
   
This list is accurate as of 9/26/18 12:11 PM.  Always use your most recent med list.  
  
  
  
  
 amoxicillin 500 mg Tab Take  by mouth.  
  
 benzonatate 100 mg capsule Commonly known as:  TESSALON Take 100 mg by mouth three (3) times daily as needed for Cough. buPROPion  mg XL tablet Commonly known as:  Oneda Butt Take 300 mg by mouth.  
  
 colestipol 1 gram tablet Commonly known as:  COLESTID Take 1 g by mouth two (2) times a day. CRESTOR 40 mg tablet Generic drug:  rosuvastatin Take 40 mg by mouth nightly. cyclobenzaprine 5 mg tablet Commonly known as:  FLEXERIL Take 5 mg by mouth daily as needed. DULoxetine 60 mg capsule Commonly known as:  CYMBALTA Take 2 Caps by mouth daily. HYDROcodone-acetaminophen 5-325 mg per tablet Commonly known as:  Dalton Songster Take 1 Tab by mouth two (2) times daily as needed. isosorbide mononitrate ER 30 mg tablet Commonly known as:  IMDUR Take  by mouth daily. JANUVIA 100 mg tablet Generic drug:  SITagliptin Take 100 mg by mouth daily. JARDIANCE 10 mg tablet Generic drug:  empagliflozin Take  by mouth daily. levothyroxine 175 mcg tablet Commonly known as:  SYNTHROID  
TAKE 1 TABLET BY MOUTH DAILY BEFORE BREAKFAST LORazepam 1 mg tablet Commonly known as:  ATIVAN Please take one tablet at bedtime and one half tablet daily as needed  
  
 metoprolol tartrate 50 mg tablet Commonly known as:  LOPRESSOR Take  by mouth.  
  
 montelukast 10 mg tablet Commonly known as:  SINGULAIR Take 10 mg by mouth daily. ondansetron 4 mg disintegrating tablet Commonly known as:  ZOFRAN ODT Take 1 Tab by mouth every eight (8) hours as needed for Nausea. OXcarbazepine 150 mg tablet Commonly known as:  TRILEPTAL Take  by mouth. QVAR 80 mcg/actuation OneClass Generic drug:  beclomethasone Take 1 Puff by inhalation two (2) times a day. REQUIP 1 mg tablet Generic drug:  rOPINIRole Take  by mouth three (3) times daily. traZODone 150 mg tablet Commonly known as:  Cornelious Haymaker Take 100-200 mg by mouth nightly. XARELTO 20 mg Tab tablet Generic drug:  rivaroxaban Take  by mouth daily. Follow-up Instructions Return in about 6 months (around 3/26/2019) for facial pain, tremors. Patient Instructions A Healthy Lifestyle: Care Instructions Your Care Instructions A healthy lifestyle can help you feel good, stay at a healthy weight, and have plenty of energy for both work and play. A healthy lifestyle is something you can share with your whole family. A healthy lifestyle also can lower your risk for serious health problems, such as high blood pressure, heart disease, and diabetes. You can follow a few steps listed below to improve your health and the health of your family. Follow-up care is a key part of your treatment and safety. Be sure to make and go to all appointments, and call your doctor if you are having problems. It's also a good idea to know your test results and keep a list of the medicines you take. How can you care for yourself at home? · Do not eat too much sugar, fat, or fast foods. You can still have dessert and treats now and then. The goal is moderation. · Start small to improve your eating habits. Pay attention to portion sizes, drink less juice and soda pop, and eat more fruits and vegetables. ¨ Eat a healthy amount of food. A 3-ounce serving of meat, for example, is about the size of a deck of cards.  Fill the rest of your plate with vegetables and whole grains. ¨ Limit the amount of soda and sports drinks you have every day. Drink more water when you are thirsty. ¨ Eat at least 5 servings of fruits and vegetables every day. It may seem like a lot, but it is not hard to reach this goal. A serving or helping is 1 piece of fruit, 1 cup of vegetables, or 2 cups of leafy, raw vegetables. Have an apple or some carrot sticks as an afternoon snack instead of a candy bar. Try to have fruits and/or vegetables at every meal. 
· Make exercise part of your daily routine. You may want to start with simple activities, such as walking, bicycling, or slow swimming. Try to be active 30 to 60 minutes every day. You do not need to do all 30 to 60 minutes all at once. For example, you can exercise 3 times a day for 10 or 20 minutes. Moderate exercise is safe for most people, but it is always a good idea to talk to your doctor before starting an exercise program. 
· Keep moving. [place] the lawn, work in the garden, or [employer]. Take the stairs instead of the elevator at work. · If you smoke, quit. People who smoke have an increased risk for heart attack, stroke, cancer, and other lung illnesses. Quitting is hard, but there are ways to boost your chance of quitting tobacco for good. ¨ Use nicotine gum, patches, or lozenges. ¨ Ask your doctor about stop-smoking programs and medicines. ¨ Keep trying. In addition to reducing your risk of diseases in the future, you will notice some benefits soon after you stop using tobacco. If you have shortness of breath or asthma symptoms, they will likely get better within a few weeks after you quit. · Limit how much alcohol you drink. Moderate amounts of alcohol (up to 2 drinks a day for men, 1 drink a day for women) are okay. But drinking too much can lead to liver problems, high blood pressure, and other health problems. Family health If you have a family, there are many things you can do together to improve your health. · Eat meals together as a family as often as possible. · Eat healthy foods. This includes fruits, vegetables, lean meats and dairy, and whole grains. · Include your family in your fitness plan. Most people think of activities such as jogging or tennis as the way to fitness, but there are many ways you and your family can be more active. Anything that makes you breathe hard and gets your heart pumping is exercise. Here are some tips: 
¨ Walk to do errands or to take your child to school or the bus. ¨ Go for a family bike ride after dinner instead of watching TV. Where can you learn more? Go to http://juniorEvotecmckayla.info/. Enter P412 in the search box to learn more about \"A Healthy Lifestyle: Care Instructions. \" Current as of: December 7, 2017 Content Version: 11.7 © 2451-6249 Asteel. Care instructions adapted under license by imo.im (which disclaims liability or warranty for this information). If you have questions about a medical condition or this instruction, always ask your healthcare professional. [address] any warranty or liability for your use of this information. Please be advised there is a $25 fee for all paperwork to be completed from our  providers. This is to be paid by the patient prior to picking up the completed forms. Head or Face Pain: Care Instructions Your Care Instructions Common causes of head or face pain are allergies, stress, and injuries. Other causes include tooth problems and sinus infections. Eating certain foods, such as chocolate or cheese, or drinking certain liquids, such as coffee or cola, can cause head pain for some people. If you have mild head pain, you may not need treatment. It is important to watch your symptoms and talk to your doctor if your pain continues or gets worse. Follow-up care is a key part of your treatment and safety.  Be sure to make and go to all appointments, and call your doctor if you are having problems. It's also a good idea to know your test results and keep a list of the medicines you take. How can you care for yourself at home? · Take pain medicines exactly as directed. ¨ If the doctor gave you a prescription medicine for pain, take it as prescribed. ¨ If you are not taking a prescription pain medicine, ask your doctor if you can take an over-the-counter pain medicine. · Take it easy for the next few days or longer if you are not feeling well. · Use a warm, moist towel or heating pad set on low to relax tight muscles in your shoulder and neck. Have someone gently massage your neck and shoulders. · Put ice or a cold pack on the area for 10 to 20 minutes at a time. Put a thin cloth between the ice and your skin. When should you call for help? Call 911 anytime you think you may need emergency care. For example, call if: 
  · You have twitching, jerking, or a seizure.  
  · You passed out (lost consciousness).  
  · You have symptoms of a stroke. These may include: 
¨ Sudden numbness, tingling, weakness, or loss of movement in your face, arm, or leg, especially on only one side of your body. ¨ Sudden vision changes. ¨ Sudden trouble speaking. ¨ Sudden confusion or trouble understanding simple statements. ¨ Sudden problems with walking or balance. ¨ A sudden, severe headache that is different from past headaches.  
  · You have jaw pain and pain in your chest, shoulder, neck, or arm.  
 Call your doctor now or seek immediate medical care if: 
  · You have a fever with a stiff neck or a severe headache.  
  · You have nausea and vomiting, or you cannot keep food or liquids down.  
 Watch closely for changes in your health, and be sure to contact your doctor if: 
  · Your head or face pain does not get better as expected. Where can you learn more? Go to http://junior-mckayla.info/. Enter P568 in the search box to learn more about \"Head or Face Pain: Care Instructions. \" Current as of: November 20, 2017 Content Version: 11.7 © 5897-9024 WellRight. Care instructions adapted under license by [employer]oken (which disclaims liability or warranty for this information). If you have questions about a medical condition or this instruction, always ask your healthcare professional. [place] [address] any warranty or liability for your use of this information. Restless Legs Syndrome: Care Instructions Your Care Instructions Restless legs syndrome is a common nervous system problem. People with this syndrome feel a creeping, achy, or unpleasant feeling in the legs and an overpowering urge to move them. It often occurs in the evening and at night and can lead to sleep problems and tiredness. Your doctor may suggest doing a study of your sleep patterns to figure out what is happening when you try to sleep. Many people get relief from symptoms when they get regular exercise, eat well, and avoid caffeine, alcohol, and tobacco. 
Follow-up care is a key part of your treatment and safety. Be sure to make and go to all appointments, and call your doctor if you are having problems. It's also a good idea to know your test results and keep a list of the medicines you take. How can you care for yourself at home? · Take your medicines exactly as prescribed. Call your doctor if you think you are having a problem with your medicine. · Try bathing in hot or cold water. Applying a heating pad or ice bag to your legs may also help symptoms. · Stretch and massage your legs before bed or when discomfort begins. · Get some exercise for at least 30 minutes a day on most days of the week. Stop exercising at least 3 hours before bedtime.  
· Try to plan for situations where you will need to remain seated for long stretches. For example, if you are traveling by car, plan some stops so you can get out and walk around. · Tell your doctor about any medicines you are taking. This includes all over-the-counter, prescription, and herbal medicines. Some medicines, such as antidepressants, antihistamines, and cold and sinus medicines, can make your symptoms worse. · Avoid caffeine products, such as coffee, tea, cola, and chocolate. Caffeine can interrupt your sleep and stimulate you. · Do not smoke. Nicotine can make restless legs worse. If you need help quitting, talk to your doctor about stop-smoking programs and medicines. These can increase your chances of quitting for good. · Do not drink alcohol late in the evening. Take steps to help you sleep better · Get plenty of sunlight in the outdoors, particularly later in the afternoon. · Use the evening hours for settling down. Avoid activities that challenge you in the hours before bedtime. · Eat meals at regular times, and do not snack before bedtime. · Keep your bedroom quiet, dark, and cool. Try using a sleep mask and earplugs to help you sleep. · Limit how much you drink at night to reduce your need to get up to urinate. But do not go to bed thirsty. · Run a fan or other steady \"white noise\" during the night if noises wake you up. · [place] the bed for sleeping and sex. Do your reading or TV watching in another room. · Once you are in bed, relax from head to toe, and guide your mind to pleasant thoughts. · Do not stay in bed longer than 8 hours, and try to avoid naps. · If your symptoms usually improve around 4 a.m. to 6 a.m., try going to bed later than usual or allowing extra time for sleeping in to help you get the rest you need. When should you call for help? Watch closely for changes in your health, and be sure to contact your doctor if: 
  · You are still not getting enough sleep.  
  · Your symptoms become more severe or happen more often. Where can you learn more? Go to http://junior-mckayla.info/. Enter R722 in the search box to learn more about \"Restless Legs Syndrome: Care Instructions. \" Current as of: October 9, 2017 Content Version: 11.7 © 5009-9993 PicketReport.com. Care instructions adapted under license by Glowpoint (which disclaims liability or warranty for this information). If you have questions about a medical condition or this instruction, always ask your healthcare professional. [address] any warranty or liability for your use of this information. Lightheadedness or Faintness: Care Instructions Your Care Instructions Lightheadedness is a feeling that you are about to faint or \"pass out. \" You do not feel as if you or your surroundings are moving. It is different from vertigo, which is the feeling that you or things around you are spinning or tilting. Lightheadedness usually goes away or gets better when you lie down. If lightheadedness gets worse, it can lead to a fainting spell. It is common to feel lightheaded from time to time. Lightheadedness usually is not caused by a serious problem. It often is caused by a short-lasting drop in blood pressure and blood flow to your head that occurs when you get up too quickly from a seated or lying position. Follow-up care is a key part of your treatment and safety. Be sure to make and go to all appointments, and call your doctor if you are having problems. It's also a good idea to know your test results and keep a list of the medicines you take. How can you care for yourself at home? · Lie down for 1 or 2 minutes when you feel lightheaded. After lying down, sit up slowly and remain sitting for 1 to 2 minutes before slowly standing up. · Avoid movements, positions, or activities that have made you lightheaded in the past. 
· Get plenty of rest, especially if you have a cold or flu, which can cause lightheadedness. · Make sure you drink plenty of fluids, especially if you have a fever or have been sweating. · Do not drive or put yourself and others in danger while you feel lightheaded. When should you call for help? Call 911 anytime you think you may need emergency care. For example, call if: 
  · You have symptoms of a stroke. These may include: 
¨ Sudden numbness, tingling, weakness, or loss of movement in your face, arm, or leg, especially on only one side of your body. ¨ Sudden vision changes. ¨ Sudden trouble speaking. ¨ Sudden confusion or trouble understanding simple statements. ¨ Sudden problems with walking or balance. ¨ A sudden, severe headache that is different from past headaches.  
  · You have symptoms of a heart attack. These may include: ¨ Chest pain or pressure, or a strange feeling in the chest. 
¨ Sweating. ¨ Shortness of breath. ¨ Nausea or vomiting. ¨ Pain, pressure, or a strange feeling in the back, neck, jaw, or upper belly or in one or both shoulders or arms. ¨ Lightheadedness or sudden weakness. ¨ A fast or irregular heartbeat. After you call 911, the  may tell you to chew 1 adult-strength or 2 to 4 low-dose aspirin. Wait for an ambulance. Do not try to drive yourself.  
 Watch closely for changes in your health, and be sure to contact your doctor if: 
  · Your lightheadedness gets worse or does not get better with home care. Where can you learn more? Go to http://junior-mckayla.info/. Enter S647 in the search box to learn more about \"Lightheadedness or Faintness: Care Instructions. \" Current as of: November 20, 2017 Content Version: 11.7 © 2230-1164 BIXI. Care instructions adapted under license by GlycoMimetics (which disclaims liability or warranty for this information).  If you have questions about a medical condition or this instruction, always ask your healthcare professional. [employer] disclaims any warranty or liability for your use of this information. Introducing [facility]! [address] introduces Prism Digital patient portal. Now you can access parts of your medical record, email your doctor's office, and request medication refills online. 1. In your internet browser, go to [email or web address] 2. Click on the First Time User? Click Here link in the Sign In box. You will see the New Member Sign Up page. 3. Enter your Prism Digital Access Code exactly as it appears below. You will not need to use this code after youve completed the sign-up process. If you do not sign up before the expiration date, you must request a new code. · Prism Digital Access Code: [email or web address] Expires: 12/24/2018  7:55 AM 
 
4. Enter the last four digits of your Social Security Number (xxxx) and Date of Birth (mm/dd/yyyy) as indicated and click Submit. You will be taken to the next sign-up page. 5. Create a Prism Digital ID. This will be your Prism Digital login ID and cannot be changed, so think of one that is secure and easy to remember. 6. Create a Prism Digital password. You can change your password at any time. 7. Enter your Password Reset Question and Answer. This can be used at a later time if you forget your password. 8. Enter your e-mail address. You will receive e-mail notification when new information is available in [address] 9. Click Sign Up. You can now view and download portions of your medical record. 10. Click the Download Summary menu link to download a portable copy of your medical information. If you have questions, please visit the Frequently Asked Questions section of the Prism Digital website. Remember, Prism Digital is NOT to be used for urgent needs. For medical emergencies, dial 911. Now available from your iPhone and Android! Please provide this summary of care documentation to your next provider. Your primary care clinician is listed as Vipul Hardin. If you have any questions after today's visit, please call [phone].

## 2018-09-27 ENCOUNTER — HOME CARE VISIT (OUTPATIENT)
Dept: SCHEDULING | Facility: HOME HEALTH | Age: 63
End: 2018-09-27
Payer: MEDICARE

## 2018-09-27 PROCEDURE — G0151 HHCP-SERV OF PT,EA 15 MIN: HCPCS

## 2018-09-27 PROCEDURE — 400013 HH SOC

## 2018-09-27 PROCEDURE — 3331090001 HH PPS REVENUE CREDIT

## 2018-09-27 PROCEDURE — 3331090002 HH PPS REVENUE DEBIT

## 2018-09-27 NOTE — PROGRESS NOTES
Pt informed of lab results and providers recommendations. Pt expressed understanding stating. Writer attempting to ask pt if DM medication has been taken as written. Pt  states, \"What do you want, go ahead. Yes I do take it everyday\", although she continued to reynaga writer off the phone. No further questions or comments voiced.

## 2018-09-27 NOTE — PROGRESS NOTES
Your cholesterol came back looking pretty good. Your triglycerides are slightly elevated. The best way to bring that number down is to incorporate more omega 3's into your diet. Here are some suggestions on how to do that: 
- eat 2-3 serving of fish like salmon and trout per week 
- eat lots of fresh dark leafy green vegetables 
- incorporate more garlic into your diet Diabetes is very poorly controlled, please confirm that diabetic medications have been taken as ordered. If so additional medication is needed. Thyroid normalized. Please inform.

## 2018-09-28 VITALS
HEART RATE: 78 BPM | DIASTOLIC BLOOD PRESSURE: 68 MMHG | RESPIRATION RATE: 16 BRPM | SYSTOLIC BLOOD PRESSURE: 132 MMHG | OXYGEN SATURATION: 98 % | TEMPERATURE: 98.6 F

## 2018-09-28 PROCEDURE — 3331090001 HH PPS REVENUE CREDIT

## 2018-09-28 PROCEDURE — 3331090002 HH PPS REVENUE DEBIT

## 2018-09-29 PROCEDURE — 3331090002 HH PPS REVENUE DEBIT

## 2018-09-29 PROCEDURE — 3331090001 HH PPS REVENUE CREDIT

## 2018-09-30 PROCEDURE — 3331090001 HH PPS REVENUE CREDIT

## 2018-09-30 PROCEDURE — 3331090002 HH PPS REVENUE DEBIT

## 2018-10-01 PROCEDURE — 3331090001 HH PPS REVENUE CREDIT

## 2018-10-01 PROCEDURE — 3331090002 HH PPS REVENUE DEBIT

## 2018-10-02 ENCOUNTER — HOME CARE VISIT (OUTPATIENT)
Dept: SCHEDULING | Facility: HOME HEALTH | Age: 63
End: 2018-10-02
Payer: MEDICARE

## 2018-10-02 ENCOUNTER — OFFICE VISIT (OUTPATIENT)
Dept: FAMILY MEDICINE CLINIC | Age: 63
End: 2018-10-02

## 2018-10-02 VITALS
HEIGHT: 67 IN | OXYGEN SATURATION: 98 % | WEIGHT: 237 LBS | DIASTOLIC BLOOD PRESSURE: 70 MMHG | TEMPERATURE: 98.2 F | SYSTOLIC BLOOD PRESSURE: 105 MMHG | BODY MASS INDEX: 37.2 KG/M2 | HEART RATE: 122 BPM

## 2018-10-02 VITALS
RESPIRATION RATE: 15 BRPM | SYSTOLIC BLOOD PRESSURE: 126 MMHG | HEART RATE: 102 BPM | DIASTOLIC BLOOD PRESSURE: 80 MMHG | OXYGEN SATURATION: 95 %

## 2018-10-02 VITALS
SYSTOLIC BLOOD PRESSURE: 152 MMHG | OXYGEN SATURATION: 97 % | TEMPERATURE: 98.1 F | RESPIRATION RATE: 18 BRPM | DIASTOLIC BLOOD PRESSURE: 90 MMHG | HEART RATE: 116 BPM

## 2018-10-02 DIAGNOSIS — I48.19 PERSISTENT ATRIAL FIBRILLATION (HCC): ICD-10-CM

## 2018-10-02 DIAGNOSIS — M25.562 CHRONIC PAIN OF LEFT KNEE: ICD-10-CM

## 2018-10-02 DIAGNOSIS — Z23 ENCOUNTER FOR IMMUNIZATION: ICD-10-CM

## 2018-10-02 DIAGNOSIS — Z79.4 TYPE 2 DIABETES MELLITUS WITHOUT COMPLICATION, WITH LONG-TERM CURRENT USE OF INSULIN (HCC): Primary | ICD-10-CM

## 2018-10-02 DIAGNOSIS — G89.29 CHRONIC PAIN OF LEFT KNEE: ICD-10-CM

## 2018-10-02 DIAGNOSIS — E11.9 TYPE 2 DIABETES MELLITUS WITHOUT COMPLICATION, WITH LONG-TERM CURRENT USE OF INSULIN (HCC): Primary | ICD-10-CM

## 2018-10-02 PROCEDURE — G0157 HHC PT ASSISTANT EA 15: HCPCS

## 2018-10-02 PROCEDURE — 3331090001 HH PPS REVENUE CREDIT

## 2018-10-02 PROCEDURE — G0152 HHCP-SERV OF OT,EA 15 MIN: HCPCS

## 2018-10-02 PROCEDURE — 3331090002 HH PPS REVENUE DEBIT

## 2018-10-02 RX ORDER — INSULIN GLARGINE 100 [IU]/ML
INJECTION, SOLUTION SUBCUTANEOUS
COMMUNITY
End: 2019-07-15 | Stop reason: SDUPTHER

## 2018-10-02 NOTE — PROGRESS NOTES
Chief Complaint   Patient presents with   [facility] ED Follow-up     Pt seen @ [facility] 9/27/18 \"A-Fib and HTN\"    Patient Education     Both Insulin's      Pt seen in the office today for ED follow up. Pt reports that she was diagnosed with afib in 2008, was well controlled. Pt reports that she went to [facility] ER for chest pain, was diagnosed with afib. Pt also reports that she saw pain management yesterday, was not given any pain medication, pt unsure why. Pt also saw endocrinology yesterday, insulins were changed. Pt has not yet started on new insulins, reports that she could not figure out how to use the needles. Pt is very confused regarding how to dose and how to use insulin pens. Pt's primary concern is knee pain, that has been a chronic issue, pt has scheduled a follow up for a procedure, she is unclear, but thinks it is multiple injections. Pt reports that she woke up with chest pain this morning. Pt also reports SOB and a stomach ache. Pt has an appt with her cardiologist, also sees an EP. Subjective: (As above and below)     Chief Complaint   Patient presents with   [facility] ED Follow-up     Pt seen @ [facility] 9/27/18 \"A-Fib and HTN\"    Patient Education     Both Insulin's      she is a 70y.o. year old female who presents for evaluation. Reviewed PmHx, RxHx, FmHx, SocHx, AllgHx and updated in chart.     Review of Systems - negative except as listed above    Objective:     Vitals:    10/02/18 1317   BP: 105/70   Pulse: (!) 122   Resp: (!) 0   Temp: 98.2 °F (36.8 °C)   TempSrc: Oral   SpO2: 98%   Weight: 237 lb (107.5 kg)   Height: 5' 7\" (1.702 m)     Physical Examination: General appearance - alert, well appearing, and in no distress  Mental status - normal mood, behavior, speech, dress, motor activity, and thought processes  Mouth - mucous membranes moist, pharynx normal without lesions  Chest - clear to auscultation, no wheezes, rales or rhonchi, symmetric air entry  Heart - a fib  Musculoskeletal - walks with a cane  Extremities - peripheral pulses normal, no pedal edema, no clubbing or cyanosis    Assessment/ Plan:   1. Type 2 diabetes mellitus without complication, with long-term current use of insulin ([facility])  Reviewed insulin dosing and use  -demonstrated how to use pens and how to change needles    2. Persistent atrial fibrillation ([facility] [address]  -continue on current medication, keep cardiology    3. Chronic pain of left knee  -follow up with pain management     Follow-up Disposition: As needed  I have discussed the diagnosis with the patient and the intended plan as seen in the above orders. The patient has received an after-visit summary and questions were answered concerning future plans.      Medication Side Effects and Warnings were discussed with patient: yes  Patient Labs were reviewed: yes  Patient Past Records were reviewed:  yes    Jorge Smith M.D.

## 2018-10-02 NOTE — MR AVS SNAPSHOT
[address] [address] 
[phone] Patient: Gasper Hernandez MRN: [id number] NAH:7/8/1959 Visit Information Date & Time Provider Department Dept. Phone Encounter #  
 10/2/2018  1:15 PM Vimal Vasquez MD [address] [facility] [phone] [id number] Upcoming Health Maintenance Date Due Hepatitis C Screening 1955 FOOT EXAM Q1 5/4/1963 EYE EXAM RETINAL OR DILATED Q1 5/4/1963 COLONOSCOPY 5/4/1971 DTaP/Tdap/Td series (1 - Tdap) 5/4/1974 MEDICARE YEARLY EXAM 3/28/2018 GLAUCOMA SCREENING Q2Y 5/4/2018 Pneumococcal 65+ Low/Medium Risk (1 of 2 - PCV13) 5/4/2018 Shingrix Vaccine Age 50> (2 of 2) 8/4/2018 HEMOGLOBIN A1C Q6M 3/25/2019 MICROALBUMIN Q1 7/18/2019 LIPID PANEL Q1 9/25/2019 BREAST CANCER SCRN MAMMOGRAM 6/7/2020 Allergies as of 10/2/2018  Review Complete On: 10/2/2018 By: Vimal Vasquez MD  
  
 Severity Noted Reaction Type Reactions Shellfish Derived High 07/12/2013    Anaphylaxis Ciprofloxacin  04/21/2016    Hives Theophylline  07/12/2013    Hives Current Immunizations  Reviewed on 6/11/2018 Name Date Influenza Vaccine (Tri) Adjuvanted 9/25/2018 Zoster Recombinant 6/9/2018 Not reviewed this visit You Were Diagnosed With   
  
 Codes Comments Type 2 diabetes mellitus without complication, with long-term current use of insulin (HCC)    -  Primary ICD-10-CM: E11.9, Z79.4 ICD-9-CM: 250.00, V58.67 Persistent atrial fibrillation (HCC)     ICD-10-CM: I48.1 ICD-9-CM: 427.31 Chronic pain of left knee     ICD-10-CM: M25.562, G89.29 ICD-9-CM: 719.46, 338.29 Vitals BP Pulse Temp Resp Height(growth percentile) Weight(growth percentile) 105/70 (BP 1 Location: Left arm, BP Patient Position: Sitting) (!) 122 98.2 °F (36.8 °C) (Oral) (!) 0 5' 7\" (1.702 m) 237 lb (107.5 kg) SpO2 BMI OB Status Smoking Status 98% 37.12 kg/m2 Postmenopausal Never Smoker Vitals History BMI and BSA Data Body Mass Index Body Surface Area  
 37.12 kg/m 2 2.25 m 2 Preferred Pharmacy Pharmacy Name Phone [facility] [phone] Your Updated Medication List  
  
   
This list is accurate as of 10/2/18  1:55 PM.  Always use your most recent med list.  
  
  
  
  
 benzonatate 100 mg capsule Commonly known as:  TESSALON Take 100 mg by mouth three (3) times daily as needed for Cough. buPROPion  mg XL tablet Commonly known as:  Jim Olszewski Take 300 mg by mouth.  
  
 colestipol 1 gram tablet Commonly known as:  COLESTID Take 1 g by mouth two (2) times a day. CRESTOR 40 mg tablet Generic drug:  rosuvastatin Take 40 mg by mouth nightly. cyclobenzaprine 5 mg tablet Commonly known as:  FLEXERIL Take 5 mg by mouth daily as needed. DULoxetine 60 mg capsule Commonly known as:  CYMBALTA Take 2 Caps by mouth daily. empagliflozin 10 mg tablet Commonly known as:  De Arm Take 10 mg by mouth daily. FIASP FLEXTOUCH U-100 INSULIN 100 unit/mL (3 mL) Inpn Generic drug:  insulin aspart (niacinamide) by SubCUTAneous route. Indications: Pt is on SS HYDROcodone-acetaminophen 5-325 mg per tablet Commonly known as:  Davee Forreston Take 1 Tab by mouth two (2) times daily as needed. insulin glargine 100 unit/mL (3 mL) Inpn Commonly known as:  LANTUS,BASAGLAR  
by SubCUTAneous route. isosorbide mononitrate ER 30 mg tablet Commonly known as:  IMDUR Take 30 mg by mouth daily. JANUVIA 100 mg tablet Generic drug:  SITagliptin Take 100 mg by mouth daily. levothyroxine 175 mcg tablet Commonly known as:  SYNTHROID  
TAKE 1 TABLET BY MOUTH DAILY BEFORE BREAKFAST LORazepam 1 mg tablet Commonly known as:  ATIVAN  
 Please take one tablet at bedtime and one half tablet daily as needed  
  
 metoprolol tartrate 50 mg tablet Commonly known as:  LOPRESSOR Take 50 mg by mouth two (2) times a day. montelukast 10 mg tablet Commonly known as:  SINGULAIR Take 10 mg by mouth daily. ondansetron 4 mg disintegrating tablet Commonly known as:  ZOFRAN ODT Take 1 Tab by mouth every eight (8) hours as needed for Nausea. OXcarbazepine 150 mg tablet Commonly known as:  TRILEPTAL Take 150 mg by mouth two (2) times a day. QVAR 80 mcg/actuation Klooff Generic drug:  beclomethasone Take 1 Puff by inhalation two (2) times a day. rivaroxaban 20 mg Tab tablet Commonly known as:  Lachelle Pare Take 20 mg by mouth daily. rOPINIRole 1 mg tablet Commonly known as:  Lou Huge Take 1 mg by mouth three (3) times daily. traZODone 150 mg tablet Commonly known as:  Wendie Flicker Take 100-200 mg by mouth nightly. To-Do List   
 10/04/2018 To Be Determined Appointment with Jolanta El PTA at [address] [address]  
  
 10/09/2018 To Be Determined Appointment with Jolanta El PTA at [address] [place] [address]  
  
 10/11/2018 To Be Determined Appointment with Jolanta El PTA at [address] [address]  
  
 10/16/2018 To Be Determined Appointment with Jolanta El PTA at [address] [address]  
  
 10/18/2018 To Be Determined Appointment with Jolanta El PTA at [address] [address]  
  
 10/23/2018 To Be Determined Appointment with Jolanta El PTA at [address] [place] [address]  
  
 10/25/2018 To Be Determined Appointment with Jolanta El PTA at [address] [address] Introducing [facility]!    
 [facility] introduces [employer]ertino patient portal. Now you can access parts of your medical record, email your doctor's office, and request medication refills online. 1. In your internet browser, go to [email or web address] 2. Click on the First Time User? Click Here link in the Sign In box. You will see the New Member Sign Up page. 3. Enter your BumpTop Access Code exactly as it appears below. You will not need to use this code after youve completed the sign-up process. If you do not sign up before the expiration date, you must request a new code. · BumpTop Access Code: [email or web address] Expires: 12/24/2018  7:55 AM 
 
4. Enter the last four digits of your Social Security Number (xxxx) and Date of Birth (mm/dd/yyyy) as indicated and click Submit. You will be taken to the next sign-up page. 5. Create a BumpTop ID. This will be your BumpTop login ID and cannot be changed, so think of one that is secure and easy to remember. 6. Create a BumpTop password. You can change your password at any time. 7. Enter your Password Reset Question and Answer. This can be used at a later time if you forget your password. 8. Enter your e-mail address. You will receive e-mail notification when new information is available in [address] 9. Click Sign Up. You can now view and download portions of your medical record. 10. Click the Download Summary menu link to download a portable copy of your medical information. If you have questions, please visit the Frequently Asked Questions section of the BumpTop website. Remember, BumpTop is NOT to be used for urgent needs. For medical emergencies, dial 911. Now available from your iPhone and Android! Please provide this summary of care documentation to your next provider. Your primary care clinician is listed as Vipul Hardin. If you have any questions after today's visit, please call [phone].

## 2018-10-03 PROCEDURE — 3331090001 HH PPS REVENUE CREDIT

## 2018-10-03 PROCEDURE — 3331090002 HH PPS REVENUE DEBIT

## 2018-10-04 ENCOUNTER — HOME CARE VISIT (OUTPATIENT)
Dept: SCHEDULING | Facility: HOME HEALTH | Age: 63
End: 2018-10-04
Payer: MEDICARE

## 2018-10-04 VITALS
DIASTOLIC BLOOD PRESSURE: 92 MMHG | SYSTOLIC BLOOD PRESSURE: 158 MMHG | HEART RATE: 107 BPM | OXYGEN SATURATION: 98 % | RESPIRATION RATE: 20 BRPM | TEMPERATURE: 98.6 F

## 2018-10-04 PROCEDURE — G0152 HHCP-SERV OF OT,EA 15 MIN: HCPCS

## 2018-10-04 PROCEDURE — 3331090002 HH PPS REVENUE DEBIT

## 2018-10-04 PROCEDURE — 3331090001 HH PPS REVENUE CREDIT

## 2018-10-05 ENCOUNTER — HOME CARE VISIT (OUTPATIENT)
Dept: HOME HEALTH SERVICES | Facility: HOME HEALTH | Age: 63
End: 2018-10-05
Payer: MEDICARE

## 2018-10-05 PROCEDURE — 3331090002 HH PPS REVENUE DEBIT

## 2018-10-05 PROCEDURE — 3331090001 HH PPS REVENUE CREDIT

## 2018-10-06 PROCEDURE — 3331090001 HH PPS REVENUE CREDIT

## 2018-10-06 PROCEDURE — 3331090002 HH PPS REVENUE DEBIT

## 2018-10-07 PROCEDURE — 3331090001 HH PPS REVENUE CREDIT

## 2018-10-07 PROCEDURE — 3331090002 HH PPS REVENUE DEBIT

## 2018-10-08 ENCOUNTER — TELEPHONE (OUTPATIENT)
Dept: FAMILY MEDICINE CLINIC | Age: 63
End: 2018-10-08

## 2018-10-08 ENCOUNTER — HOME CARE VISIT (OUTPATIENT)
Dept: HOME HEALTH SERVICES | Facility: HOME HEALTH | Age: 63
End: 2018-10-08
Payer: MEDICARE

## 2018-10-08 ENCOUNTER — HOME CARE VISIT (OUTPATIENT)
Dept: SCHEDULING | Facility: HOME HEALTH | Age: 63
End: 2018-10-08
Payer: MEDICARE

## 2018-10-08 VITALS
RESPIRATION RATE: 20 BRPM | TEMPERATURE: 98.1 F | SYSTOLIC BLOOD PRESSURE: 148 MMHG | DIASTOLIC BLOOD PRESSURE: 100 MMHG | HEART RATE: 128 BPM | OXYGEN SATURATION: 98 %

## 2018-10-08 VITALS
DIASTOLIC BLOOD PRESSURE: 88 MMHG | SYSTOLIC BLOOD PRESSURE: 144 MMHG | HEART RATE: 102 BPM | RESPIRATION RATE: 18 BRPM | OXYGEN SATURATION: 97 % | TEMPERATURE: 97.7 F

## 2018-10-08 PROCEDURE — 3331090002 HH PPS REVENUE DEBIT

## 2018-10-08 PROCEDURE — G0299 HHS/HOSPICE OF RN EA 15 MIN: HCPCS

## 2018-10-08 PROCEDURE — 3331090001 HH PPS REVENUE CREDIT

## 2018-10-08 PROCEDURE — G0152 HHCP-SERV OF OT,EA 15 MIN: HCPCS

## 2018-10-08 PROCEDURE — G0155 HHCP-SVS OF CSW,EA 15 MIN: HCPCS

## 2018-10-08 NOTE — TELEPHONE ENCOUNTER
Ernst De Los Santos with [facility] called in and stated that the pt is diabetic and would like to know if there is a specific blood sugar number cutoff that you would like them to call you with. (if it goes too high) Ernst De Los Santos said they are trying to avoid too many calls to your office.   Phone [phone]

## 2018-10-09 ENCOUNTER — HOME CARE VISIT (OUTPATIENT)
Dept: SCHEDULING | Facility: HOME HEALTH | Age: 63
End: 2018-10-09
Payer: MEDICARE

## 2018-10-09 PROCEDURE — 3331090002 HH PPS REVENUE DEBIT

## 2018-10-09 PROCEDURE — G0157 HHC PT ASSISTANT EA 15: HCPCS

## 2018-10-09 PROCEDURE — 3331090001 HH PPS REVENUE CREDIT

## 2018-10-10 ENCOUNTER — TELEPHONE (OUTPATIENT)
Dept: FAMILY MEDICINE CLINIC | Age: 63
End: 2018-10-10

## 2018-10-10 ENCOUNTER — HOME CARE VISIT (OUTPATIENT)
Dept: SCHEDULING | Facility: HOME HEALTH | Age: 63
End: 2018-10-10
Payer: MEDICARE

## 2018-10-10 VITALS
OXYGEN SATURATION: 98 % | HEART RATE: 116 BPM | DIASTOLIC BLOOD PRESSURE: 69 MMHG | RESPIRATION RATE: 17 BRPM | SYSTOLIC BLOOD PRESSURE: 138 MMHG

## 2018-10-10 PROCEDURE — 3331090002 HH PPS REVENUE DEBIT

## 2018-10-10 PROCEDURE — G0152 HHCP-SERV OF OT,EA 15 MIN: HCPCS

## 2018-10-10 PROCEDURE — 3331090001 HH PPS REVENUE CREDIT

## 2018-10-10 NOTE — TELEPHONE ENCOUNTER
----- Message from Evelin Jorgensen sent at 10/10/2018 11:32 AM EDT -----  Regarding: Dr. Hardin/ Cami Carcamo with [facility], informing PCP pt experienced chest pains that started yesterday and went into left shoulder pains. Jorge Shah called 911this morning, who arrived at 11:30 am and taking pt to [facility]. Pt best contact([phone]).   Karly Holm  [phone]. [phone]

## 2018-10-11 ENCOUNTER — HOME CARE VISIT (OUTPATIENT)
Dept: SCHEDULING | Facility: HOME HEALTH | Age: 63
End: 2018-10-11
Payer: MEDICARE

## 2018-10-11 VITALS
OXYGEN SATURATION: 97 % | DIASTOLIC BLOOD PRESSURE: 88 MMHG | SYSTOLIC BLOOD PRESSURE: 146 MMHG | HEART RATE: 92 BPM | RESPIRATION RATE: 18 BRPM | TEMPERATURE: 97.7 F

## 2018-10-11 VITALS
SYSTOLIC BLOOD PRESSURE: 140 MMHG | HEART RATE: 94 BPM | OXYGEN SATURATION: 96 % | DIASTOLIC BLOOD PRESSURE: 80 MMHG | RESPIRATION RATE: 16 BRPM

## 2018-10-11 PROCEDURE — 3331090001 HH PPS REVENUE CREDIT

## 2018-10-11 PROCEDURE — G0299 HHS/HOSPICE OF RN EA 15 MIN: HCPCS

## 2018-10-11 PROCEDURE — 3331090002 HH PPS REVENUE DEBIT

## 2018-10-11 PROCEDURE — G0157 HHC PT ASSISTANT EA 15: HCPCS

## 2018-10-12 ENCOUNTER — HOME CARE VISIT (OUTPATIENT)
Dept: SCHEDULING | Facility: HOME HEALTH | Age: 63
End: 2018-10-12
Payer: MEDICARE

## 2018-10-12 VITALS
TEMPERATURE: 97.7 F | OXYGEN SATURATION: 98 % | HEART RATE: 83 BPM | SYSTOLIC BLOOD PRESSURE: 149 MMHG | DIASTOLIC BLOOD PRESSURE: 83 MMHG | RESPIRATION RATE: 16 BRPM

## 2018-10-12 PROCEDURE — G0153 HHCP-SVS OF S/L PATH,EA 15MN: HCPCS

## 2018-10-12 PROCEDURE — 3331090001 HH PPS REVENUE CREDIT

## 2018-10-12 PROCEDURE — 3331090002 HH PPS REVENUE DEBIT

## 2018-10-13 PROCEDURE — 3331090002 HH PPS REVENUE DEBIT

## 2018-10-13 PROCEDURE — 3331090001 HH PPS REVENUE CREDIT

## 2018-10-14 PROCEDURE — 3331090001 HH PPS REVENUE CREDIT

## 2018-10-14 PROCEDURE — 3331090002 HH PPS REVENUE DEBIT

## 2018-10-15 VITALS
DIASTOLIC BLOOD PRESSURE: 80 MMHG | SYSTOLIC BLOOD PRESSURE: 130 MMHG | RESPIRATION RATE: 18 BRPM | OXYGEN SATURATION: 97 % | TEMPERATURE: 97.7 F | HEART RATE: 100 BPM

## 2018-10-15 PROCEDURE — 3331090002 HH PPS REVENUE DEBIT

## 2018-10-15 PROCEDURE — 3331090001 HH PPS REVENUE CREDIT

## 2018-10-16 ENCOUNTER — APPOINTMENT (OUTPATIENT)
Dept: GENERAL RADIOLOGY | Age: 63
End: 2018-10-16
Attending: STUDENT IN AN ORGANIZED HEALTH CARE EDUCATION/TRAINING PROGRAM
Payer: MEDICARE

## 2018-10-16 ENCOUNTER — HOSPITAL ENCOUNTER (EMERGENCY)
Age: 63
Discharge: HOME | End: 2018-10-16
Attending: EMERGENCY MEDICINE
Payer: MEDICARE

## 2018-10-16 ENCOUNTER — HOME CARE VISIT (OUTPATIENT)
Dept: SCHEDULING | Facility: HOME HEALTH | Age: 63
End: 2018-10-16
Payer: MEDICARE

## 2018-10-16 VITALS
DIASTOLIC BLOOD PRESSURE: 98 MMHG | HEART RATE: 114 BPM | SYSTOLIC BLOOD PRESSURE: 160 MMHG | OXYGEN SATURATION: 98 % | TEMPERATURE: 97.5 F | RESPIRATION RATE: 20 BRPM

## 2018-10-16 VITALS
DIASTOLIC BLOOD PRESSURE: 95 MMHG | RESPIRATION RATE: 18 BRPM | OXYGEN SATURATION: 97 % | SYSTOLIC BLOOD PRESSURE: 145 MMHG | HEART RATE: 115 BPM | TEMPERATURE: 97.5 F

## 2018-10-16 VITALS
OXYGEN SATURATION: 96 % | WEIGHT: 237 LBS | BODY MASS INDEX: 37.2 KG/M2 | HEART RATE: 106 BPM | DIASTOLIC BLOOD PRESSURE: 96 MMHG | RESPIRATION RATE: 21 BRPM | TEMPERATURE: 98.3 F | HEIGHT: 67 IN | SYSTOLIC BLOOD PRESSURE: 114 MMHG

## 2018-10-16 DIAGNOSIS — E83.42 HYPOMAGNESEMIA: ICD-10-CM

## 2018-10-16 DIAGNOSIS — E87.6 HYPOKALEMIA: ICD-10-CM

## 2018-10-16 DIAGNOSIS — R07.2 PRECORDIAL PAIN: Primary | ICD-10-CM

## 2018-10-16 LAB
ALBUMIN SERPL-MCNC: 2.6 G/DL (ref 3.5–5)
ALBUMIN/GLOB SERPL: 1 {RATIO} (ref 1.1–2.2)
ALP SERPL-CCNC: 80 U/L (ref 45–117)
ALT SERPL-CCNC: 30 U/L (ref 12–78)
ANION GAP SERPL CALC-SCNC: 11 MMOL/L (ref 5–15)
AST SERPL-CCNC: 19 U/L (ref 15–37)
BASOPHILS # BLD: 0 K/UL (ref 0–0.1)
BASOPHILS NFR BLD: 0 % (ref 0–1)
BILIRUB SERPL-MCNC: 0.4 MG/DL (ref 0.2–1)
BUN SERPL-MCNC: 14 MG/DL (ref 6–20)
BUN/CREAT SERPL: 21 (ref 12–20)
CALCIUM SERPL-MCNC: 6.6 MG/DL (ref 8.5–10.1)
CHLORIDE SERPL-SCNC: 109 MMOL/L (ref 97–108)
CO2 SERPL-SCNC: 22 MMOL/L (ref 21–32)
COMMENT, HOLDF: NORMAL
CREAT SERPL-MCNC: 0.67 MG/DL (ref 0.55–1.02)
D DIMER PPP FEU-MCNC: 0.44 MG/L FEU (ref 0–0.65)
DIFFERENTIAL METHOD BLD: ABNORMAL
EOSINOPHIL # BLD: 0.1 K/UL (ref 0–0.4)
EOSINOPHIL NFR BLD: 2 % (ref 0–7)
ERYTHROCYTE [DISTWIDTH] IN BLOOD BY AUTOMATED COUNT: 12.1 % (ref 11.5–14.5)
GLOBULIN SER CALC-MCNC: 2.5 G/DL (ref 2–4)
GLUCOSE SERPL-MCNC: 158 MG/DL (ref 65–100)
HCT VFR BLD AUTO: 39.7 % (ref 35–47)
HGB BLD-MCNC: 13.7 G/DL (ref 11.5–16)
IMM GRANULOCYTES # BLD: 0.1 K/UL (ref 0–0.04)
IMM GRANULOCYTES NFR BLD AUTO: 1 % (ref 0–0.5)
LYMPHOCYTES # BLD: 1.9 K/UL (ref 0.8–3.5)
LYMPHOCYTES NFR BLD: 21 % (ref 12–49)
MAGNESIUM SERPL-MCNC: 1.3 MG/DL (ref 1.6–2.4)
MCH RBC QN AUTO: 32.8 PG (ref 26–34)
MCHC RBC AUTO-ENTMCNC: 34.5 G/DL (ref 30–36.5)
MCV RBC AUTO: 95 FL (ref 80–99)
MONOCYTES # BLD: 0.7 K/UL (ref 0–1)
MONOCYTES NFR BLD: 8 % (ref 5–13)
NEUTS SEG # BLD: 6.2 K/UL (ref 1.8–8)
NEUTS SEG NFR BLD: 69 % (ref 32–75)
NRBC # BLD: 0 K/UL (ref 0–0.01)
NRBC BLD-RTO: 0 PER 100 WBC
PLATELET # BLD AUTO: 194 K/UL (ref 150–400)
PMV BLD AUTO: 8.6 FL (ref 8.9–12.9)
POTASSIUM SERPL-SCNC: 2.8 MMOL/L (ref 3.5–5.1)
PROT SERPL-MCNC: 5.1 G/DL (ref 6.4–8.2)
RBC # BLD AUTO: 4.18 M/UL (ref 3.8–5.2)
SAMPLES BEING HELD,HOLD: NORMAL
SODIUM SERPL-SCNC: 142 MMOL/L (ref 136–145)
TROPONIN I SERPL-MCNC: <0.05 NG/ML
WBC # BLD AUTO: 9 K/UL (ref 3.6–11)

## 2018-10-16 PROCEDURE — 85025 COMPLETE CBC W/AUTO DIFF WBC: CPT | Performed by: STUDENT IN AN ORGANIZED HEALTH CARE EDUCATION/TRAINING PROGRAM

## 2018-10-16 PROCEDURE — G0152 HHCP-SERV OF OT,EA 15 MIN: HCPCS

## 2018-10-16 PROCEDURE — 3331090002 HH PPS REVENUE DEBIT

## 2018-10-16 PROCEDURE — 3331090001 HH PPS REVENUE CREDIT

## 2018-10-16 PROCEDURE — 74011250637 HC RX REV CODE- 250/637: Performed by: EMERGENCY MEDICINE

## 2018-10-16 PROCEDURE — 96367 TX/PROPH/DG ADDL SEQ IV INF: CPT

## 2018-10-16 PROCEDURE — 85379 FIBRIN DEGRADATION QUANT: CPT | Performed by: EMERGENCY MEDICINE

## 2018-10-16 PROCEDURE — G0300 HHS/HOSPICE OF LPN EA 15 MIN: HCPCS

## 2018-10-16 PROCEDURE — 36415 COLL VENOUS BLD VENIPUNCTURE: CPT | Performed by: STUDENT IN AN ORGANIZED HEALTH CARE EDUCATION/TRAINING PROGRAM

## 2018-10-16 PROCEDURE — 93005 ELECTROCARDIOGRAM TRACING: CPT

## 2018-10-16 PROCEDURE — 83735 ASSAY OF MAGNESIUM: CPT | Performed by: EMERGENCY MEDICINE

## 2018-10-16 PROCEDURE — 71045 X-RAY EXAM CHEST 1 VIEW: CPT

## 2018-10-16 PROCEDURE — 80053 COMPREHEN METABOLIC PANEL: CPT | Performed by: STUDENT IN AN ORGANIZED HEALTH CARE EDUCATION/TRAINING PROGRAM

## 2018-10-16 PROCEDURE — 96365 THER/PROPH/DIAG IV INF INIT: CPT

## 2018-10-16 PROCEDURE — 96375 TX/PRO/DX INJ NEW DRUG ADDON: CPT

## 2018-10-16 PROCEDURE — 99285 EMERGENCY DEPT VISIT HI MDM: CPT

## 2018-10-16 PROCEDURE — 84484 ASSAY OF TROPONIN QUANT: CPT | Performed by: STUDENT IN AN ORGANIZED HEALTH CARE EDUCATION/TRAINING PROGRAM

## 2018-10-16 PROCEDURE — 74011250636 HC RX REV CODE- 250/636: Performed by: EMERGENCY MEDICINE

## 2018-10-16 RX ORDER — KETOROLAC TROMETHAMINE 30 MG/ML
15 INJECTION, SOLUTION INTRAMUSCULAR; INTRAVENOUS
Status: COMPLETED | OUTPATIENT
Start: 2018-10-16 | End: 2018-10-16

## 2018-10-16 RX ORDER — POTASSIUM CHLORIDE 7.45 MG/ML
10 INJECTION INTRAVENOUS
Status: DISCONTINUED | OUTPATIENT
Start: 2018-10-16 | End: 2018-10-16

## 2018-10-16 RX ORDER — POTASSIUM CHLORIDE 7.45 MG/ML
10 INJECTION INTRAVENOUS ONCE
Status: COMPLETED | OUTPATIENT
Start: 2018-10-16 | End: 2018-10-16

## 2018-10-16 RX ORDER — POTASSIUM CHLORIDE 750 MG/1
40 TABLET, FILM COATED, EXTENDED RELEASE ORAL
Status: COMPLETED | OUTPATIENT
Start: 2018-10-16 | End: 2018-10-16

## 2018-10-16 RX ORDER — MAGNESIUM SULFATE HEPTAHYDRATE 40 MG/ML
2 INJECTION, SOLUTION INTRAVENOUS ONCE
Status: COMPLETED | OUTPATIENT
Start: 2018-10-16 | End: 2018-10-16

## 2018-10-16 RX ORDER — POTASSIUM CHLORIDE 1500 MG/1
40 TABLET, FILM COATED, EXTENDED RELEASE ORAL 2 TIMES DAILY
Qty: 12 TAB | Refills: 0 | Status: SHIPPED | OUTPATIENT
Start: 2018-10-16 | End: 2018-10-19

## 2018-10-16 RX ADMIN — POTASSIUM CHLORIDE 10 MEQ: 10 INJECTION, SOLUTION INTRAVENOUS at 16:24

## 2018-10-16 RX ADMIN — MAGNESIUM SULFATE IN WATER 2 G: 40 INJECTION, SOLUTION INTRAVENOUS at 18:12

## 2018-10-16 RX ADMIN — POTASSIUM CHLORIDE 40 MEQ: 750 TABLET, FILM COATED, EXTENDED RELEASE ORAL at 16:24

## 2018-10-16 RX ADMIN — KETOROLAC TROMETHAMINE 15 MG: 30 INJECTION, SOLUTION INTRAMUSCULAR at 15:45

## 2018-10-16 RX ADMIN — SODIUM CHLORIDE 1000 ML: 900 INJECTION, SOLUTION INTRAVENOUS at 16:17

## 2018-10-16 NOTE — ED NOTES
Bedside and Verbal shift change report given to [address] [place] [address] Rd  (oncoming nurse) by Yara Ch RN  (offgoing nurse). Report included the following information SBAR.

## 2018-10-16 NOTE — DISCHARGE INSTRUCTIONS
Hypokalemia: Care Instructions  Your Care Instructions    Hypokalemia (say \"bx-tv-yfh-FERMÍN-lillian-uh\") is a low level of potassium. The heart, muscles, kidneys, and nervous system all need potassium to work well. This problem has many different causes. Kidney problems, diet, and medicines like diuretics and laxatives can cause it. So can vomiting or diarrhea. In some cases, cancer is the cause. Your doctor may do tests to find the cause of your low potassium levels. You may need medicines to bring your potassium levels back to normal. You may also need regular blood tests to check your potassium. If you have very low potassium, you may need intravenous (IV) medicines. You also may need tests to check the electrical activity of your heart. Heart problems caused by low potassium levels can be very serious. Follow-up care is a key part of your treatment and safety. Be sure to make and go to all appointments, and call your doctor if you are having problems. It's also a good idea to know your test results and keep a list of the medicines you take. How can you care for yourself at home? · If your doctor recommends it, eat foods that have a lot of potassium. These include fresh fruits, juices, and vegetables. They also include nuts, beans, and milk. · Be safe with medicines. If your doctor prescribes medicines or potassium supplements, take them exactly as directed. Call your doctor if you have any problems with your medicines. · Get your potassium levels tested as often as your doctor tells you. When should you call for help? Call 911 anytime you think you may need emergency care. For example, call if:    · You feel like your heart is missing beats. Heart problems caused by low potassium can cause death.     · You passed out (lost consciousness).     · You have a seizure.    Call your doctor now or seek immediate medical care if:    · You feel weak or unusually tired.     · You have severe arm or leg cramps.   · You have tingling or numbness.     · You feel sick to your stomach, or you vomit.     · You have belly cramps.     · You feel bloated or constipated.     · You have to urinate a lot.     · You feel very thirsty most of the time.     · You are dizzy or lightheaded, or you feel like you may faint.     · You feel depressed, or you lose touch with reality.    Watch closely for changes in your health, and be sure to contact your doctor if:    · You do not get better as expected. Where can you learn more? Go to http://junior-mckayla.info/. Enter G358 in the search box to learn more about \"Hypokalemia: Care Instructions. \"  Current as of: March 15, 2018  Content Version: 11.8  © 5942-5104 [employer]. Care instructions adapted under license by [employer] (which disclaims liability or warranty for this information). If you have questions about a medical condition or this instruction, always ask your healthcare professional. [address] any warranty or liability for your use of this information. Chest Pain: Care Instructions  Your Care Instructions    There are many things that can cause chest pain. Some are not serious and will get better on their own in a few days. But some kinds of chest pain need more testing and treatment. Your doctor may have recommended a follow-up visit in the next 8 to 12 hours. If you are not getting better, you may need more tests or treatment. Even though your doctor has released you, you still need to watch for any problems. The doctor carefully checked you, but sometimes problems can develop later. If you have new symptoms or if your symptoms do not get better, get medical care right away. If you have worse or different chest pain or pressure that lasts more than 5 minutes or you passed out (lost consciousness), call 911 or seek other emergency help right away.    A medical visit is only one step in your treatment. Even if you feel better, you still need to do what your doctor recommends, such as going to all suggested follow-up appointments and taking medicines exactly as directed. This will help you recover and help prevent future problems. How can you care for yourself at home? · Rest until you feel better. · Take your medicine exactly as prescribed. Call your doctor if you think you are having a problem with your medicine. · Do not drive after taking a prescription pain medicine. When should you call for help? Call 911 if:    · You passed out (lost consciousness).     · You have severe difficulty breathing.     · You have symptoms of a heart attack. These may include:  ¨ Chest pain or pressure, or a strange feeling in your chest.  ¨ Sweating. ¨ Shortness of breath. ¨ Nausea or vomiting. ¨ Pain, pressure, or a strange feeling in your back, neck, jaw, or upper belly or in one or both shoulders or arms. ¨ Lightheadedness or sudden weakness. ¨ A fast or irregular heartbeat. After you call 911, the  may tell you to chew 1 adult-strength or 2 to 4 low-dose aspirin. Wait for an ambulance. Do not try to drive yourself.    Call your doctor today if:    · You have any trouble breathing.     · Your chest pain gets worse.     · You are dizzy or lightheaded, or you feel like you may faint.     · You are not getting better as expected.     · You are having new or different chest pain. Where can you learn more? Go to http://junior-mckayla.info/. Enter A120 in the search box to learn more about \"Chest Pain: Care Instructions. \"  Current as of: November 20, 2017  Content Version: 11.8  © 7283-8319 [employer]. Care instructions adapted under license by [employer] (which disclaims liability or warranty for this information).  If you have questions about a medical condition or this instruction, always ask your healthcare professional. Monika Mercedes disclaims any warranty or liability for your use of this information.

## 2018-10-16 NOTE — ED PROVIDER NOTES
Patient is a 70 y.o. female presenting with chest pain. The history is provided by the patient. Chest Pain (Angina) This is a chronic problem. Episode onset: \"many months\" The problem has not changed since onset. The problem occurs constantly. The pain is associated with normal activity. The pain is present in the substernal region. The pain is mild. The quality of the pain is described as sharp. The pain does not radiate. The symptoms are aggravated by stress. Associated symptoms include palpitations. Pertinent negatives include no cough, no fever, no PND, no shortness of breath and no vomiting. She has tried nothing for the symptoms. Risk factors include diabetes mellitus. Past Medical History:  
Diagnosis Date  [facility]  Anxiety 7/12/2013  Arrhythmia \"fast heart rate\". Ablation x2  Arthritis  Asthma  Depression  Diabetes ([facility]  Factor V Leiden ([facility]  Isthmica nodosa salpingitis 7/12/2013  Thyroid disease Past Surgical History:  
Procedure Laterality Date  ABDOMEN SURGERY PROC UNLISTED    
 cholecystectomy  CARDIAC SURG PROCEDURE UNLIST    
 ablation 2010 x2  HX CHOLECYSTECTOMY  HX HEENT    
 HX PACEMAKER Family History:  
Problem Relation Age of Onset  Heart Disease Father  Cancer Maternal Grandfather Social History Social History  Marital status:  Spouse name: N/A  
 Number of children: N/A  
 Years of education: N/A Occupational History  Not on file. Social History Main Topics  Smoking status: Never Smoker  Smokeless tobacco: Never Used  Alcohol use No  
 Drug use: No  
 Sexual activity: Not Currently Other Topics Concern  Not on file Social History Narrative ALLERGIES: Shellfish derived; Ciprofloxacin; and Theophylline Review of Systems Constitutional: Negative for fever. Respiratory: Negative for cough and shortness of breath. Cardiovascular: Positive for chest pain and palpitations. Negative for PND. Gastrointestinal: Negative for vomiting. All other systems reviewed and are negative. Vitals:  
 10/16/18 1454 10/16/18 1455 10/16/18 1500 BP: 149/86 149/86 155/86 Pulse: (!) 129 (!) 128 (!) 129 Resp: (!) 31 21 19 Temp:  98.3 °F (36.8 °C) SpO2:  98% 98% Weight:  107.5 kg (237 lb) Height:  5' 7\" (1.702 m) Physical Exam  
Constitutional: She appears well-developed and well-nourished. No distress. HENT:  
Head: Normocephalic and atraumatic. Eyes: Conjunctivae are normal.  
Neck: Neck supple. Cardiovascular: Regular rhythm and normal heart sounds. Tachycardia present. Pulmonary/Chest: Effort normal and breath sounds normal. No stridor. No respiratory distress. She exhibits tenderness (sternal). Abdominal: She exhibits no distension. Musculoskeletal: Normal range of motion. Neurological: She is alert. Coordination normal.  
Skin: Skin is warm and dry. Psychiatric: She has a normal mood and affect. Nursing note and vitals reviewed. MDM 
 
70 y.o. female presents with \"months\" of ongoing precordial chest pain that is reproducible and highly atypical. She is asking for pain medicine. She tells me she has been to the ED for these symptoms \"7 times before and they keep telling me nothing is wrong\" including at [facility] 6 days ago. Offered NSAIDs and tylenol but patient declined tylenol. Nitroglycerin en route did not improve symptoms. She appears very anxious and distracted. Supportive care measures provided pending completion of workup. Patient feeling better after toradol. She is anticoagulated on xarelto for a fib but is tachycardic and is not sure if she missed doses. D-dimer is low risk for PE, doubt clinically. Tachycardia improved with supportive care. EKG nonischemic and troponin negative despite longstanding pain. Plan to follow up with PCP as needed and return precautions discussed for worsening or new concerning symptoms. ED Course Procedures EKG: sinus tachycardia, rate 122, LBBB.

## 2018-10-17 LAB
ATRIAL RATE: 122 BPM
CALCULATED P AXIS, ECG09: 52 DEGREES
CALCULATED R AXIS, ECG10: -4 DEGREES
CALCULATED T AXIS, ECG11: 105 DEGREES
DIAGNOSIS, 93000: NORMAL
P-R INTERVAL, ECG05: 132 MS
Q-T INTERVAL, ECG07: 346 MS
QRS DURATION, ECG06: 138 MS
QTC CALCULATION (BEZET), ECG08: 493 MS
VENTRICULAR RATE, ECG03: 122 BPM

## 2018-10-17 PROCEDURE — 3331090002 HH PPS REVENUE DEBIT

## 2018-10-17 PROCEDURE — 3331090001 HH PPS REVENUE CREDIT

## 2018-10-18 ENCOUNTER — DOCUMENTATION ONLY (OUTPATIENT)
Dept: FAMILY MEDICINE CLINIC | Age: 63
End: 2018-10-18

## 2018-10-18 PROCEDURE — 3331090002 HH PPS REVENUE DEBIT

## 2018-10-18 PROCEDURE — 3331090001 HH PPS REVENUE CREDIT

## 2018-10-18 NOTE — PROGRESS NOTES
Primary Care Pharmacy order form for Glucose Strips was placed on Dr. Mukesh Lopez desk on 10/18/18.

## 2018-10-19 ENCOUNTER — HOME CARE VISIT (OUTPATIENT)
Dept: HOME HEALTH SERVICES | Facility: HOME HEALTH | Age: 63
End: 2018-10-19
Payer: MEDICARE

## 2018-10-19 ENCOUNTER — HOME CARE VISIT (OUTPATIENT)
Dept: SCHEDULING | Facility: HOME HEALTH | Age: 63
End: 2018-10-19
Payer: MEDICARE

## 2018-10-19 PROCEDURE — 3331090001 HH PPS REVENUE CREDIT

## 2018-10-19 PROCEDURE — 3331090002 HH PPS REVENUE DEBIT

## 2018-10-20 PROCEDURE — 3331090002 HH PPS REVENUE DEBIT

## 2018-10-20 PROCEDURE — 3331090001 HH PPS REVENUE CREDIT

## 2018-10-21 PROCEDURE — 3331090002 HH PPS REVENUE DEBIT

## 2018-10-21 PROCEDURE — 3331090001 HH PPS REVENUE CREDIT

## 2018-10-22 ENCOUNTER — DOCUMENTATION ONLY (OUTPATIENT)
Dept: FAMILY MEDICINE CLINIC | Age: 63
End: 2018-10-22

## 2018-10-22 PROCEDURE — 3331090001 HH PPS REVENUE CREDIT

## 2018-10-22 PROCEDURE — 3331090002 HH PPS REVENUE DEBIT

## 2018-10-23 ENCOUNTER — HOME CARE VISIT (OUTPATIENT)
Dept: SCHEDULING | Facility: HOME HEALTH | Age: 63
End: 2018-10-23
Payer: MEDICARE

## 2018-10-23 VITALS
HEART RATE: 108 BPM | DIASTOLIC BLOOD PRESSURE: 84 MMHG | RESPIRATION RATE: 20 BRPM | TEMPERATURE: 97.8 F | SYSTOLIC BLOOD PRESSURE: 118 MMHG | OXYGEN SATURATION: 98 %

## 2018-10-23 PROCEDURE — 3331090001 HH PPS REVENUE CREDIT

## 2018-10-23 PROCEDURE — G0300 HHS/HOSPICE OF LPN EA 15 MIN: HCPCS

## 2018-10-23 PROCEDURE — 3331090002 HH PPS REVENUE DEBIT

## 2018-10-24 ENCOUNTER — HOME CARE VISIT (OUTPATIENT)
Dept: HOME HEALTH SERVICES | Facility: HOME HEALTH | Age: 63
End: 2018-10-24
Payer: MEDICARE

## 2018-10-24 PROCEDURE — 3331090002 HH PPS REVENUE DEBIT

## 2018-10-24 PROCEDURE — 3331090001 HH PPS REVENUE CREDIT

## 2018-10-25 ENCOUNTER — HOME CARE VISIT (OUTPATIENT)
Dept: SCHEDULING | Facility: HOME HEALTH | Age: 63
End: 2018-10-25
Payer: MEDICARE

## 2018-10-25 VITALS
HEART RATE: 97 BPM | DIASTOLIC BLOOD PRESSURE: 90 MMHG | OXYGEN SATURATION: 97 % | TEMPERATURE: 98 F | SYSTOLIC BLOOD PRESSURE: 142 MMHG | RESPIRATION RATE: 18 BRPM

## 2018-10-25 PROCEDURE — 3331090001 HH PPS REVENUE CREDIT

## 2018-10-25 PROCEDURE — 3331090002 HH PPS REVENUE DEBIT

## 2018-10-25 PROCEDURE — G0152 HHCP-SERV OF OT,EA 15 MIN: HCPCS

## 2018-10-25 PROCEDURE — G0299 HHS/HOSPICE OF RN EA 15 MIN: HCPCS

## 2018-10-26 ENCOUNTER — HOME CARE VISIT (OUTPATIENT)
Dept: SCHEDULING | Facility: HOME HEALTH | Age: 63
End: 2018-10-26
Payer: MEDICARE

## 2018-10-26 ENCOUNTER — HOME CARE VISIT (OUTPATIENT)
Dept: HOME HEALTH SERVICES | Facility: HOME HEALTH | Age: 63
End: 2018-10-26
Payer: MEDICARE

## 2018-10-26 PROCEDURE — 3331090001 HH PPS REVENUE CREDIT

## 2018-10-26 PROCEDURE — G0151 HHCP-SERV OF PT,EA 15 MIN: HCPCS

## 2018-10-26 PROCEDURE — 3331090002 HH PPS REVENUE DEBIT

## 2018-10-27 VITALS
TEMPERATURE: 98.1 F | OXYGEN SATURATION: 99 % | HEART RATE: 88 BPM | SYSTOLIC BLOOD PRESSURE: 120 MMHG | RESPIRATION RATE: 18 BRPM | DIASTOLIC BLOOD PRESSURE: 78 MMHG

## 2018-10-27 PROCEDURE — 3331090001 HH PPS REVENUE CREDIT

## 2018-10-27 PROCEDURE — 3331090002 HH PPS REVENUE DEBIT

## 2018-10-28 PROCEDURE — 3331090002 HH PPS REVENUE DEBIT

## 2018-10-28 PROCEDURE — 3331090001 HH PPS REVENUE CREDIT

## 2018-10-29 VITALS
SYSTOLIC BLOOD PRESSURE: 132 MMHG | RESPIRATION RATE: 18 BRPM | TEMPERATURE: 97.8 F | OXYGEN SATURATION: 98 % | DIASTOLIC BLOOD PRESSURE: 78 MMHG | HEART RATE: 90 BPM

## 2018-10-29 PROCEDURE — 3331090002 HH PPS REVENUE DEBIT

## 2018-10-29 PROCEDURE — 3331090001 HH PPS REVENUE CREDIT

## 2018-10-30 ENCOUNTER — DOCUMENTATION ONLY (OUTPATIENT)
Dept: FAMILY MEDICINE CLINIC | Age: 63
End: 2018-10-30

## 2018-10-30 ENCOUNTER — HOME CARE VISIT (OUTPATIENT)
Dept: SCHEDULING | Facility: HOME HEALTH | Age: 63
End: 2018-10-30
Payer: MEDICARE

## 2018-10-30 VITALS
RESPIRATION RATE: 17 BRPM | SYSTOLIC BLOOD PRESSURE: 130 MMHG | DIASTOLIC BLOOD PRESSURE: 85 MMHG | OXYGEN SATURATION: 97 % | HEART RATE: 94 BPM

## 2018-10-30 PROCEDURE — G0157 HHC PT ASSISTANT EA 15: HCPCS

## 2018-10-30 PROCEDURE — 3331090001 HH PPS REVENUE CREDIT

## 2018-10-30 PROCEDURE — 3331090002 HH PPS REVENUE DEBIT

## 2018-10-31 ENCOUNTER — HOME CARE VISIT (OUTPATIENT)
Dept: SCHEDULING | Facility: HOME HEALTH | Age: 63
End: 2018-10-31
Payer: MEDICARE

## 2018-10-31 VITALS
OXYGEN SATURATION: 98 % | SYSTOLIC BLOOD PRESSURE: 158 MMHG | HEART RATE: 87 BPM | DIASTOLIC BLOOD PRESSURE: 80 MMHG | TEMPERATURE: 98.4 F

## 2018-10-31 PROCEDURE — 3331090001 HH PPS REVENUE CREDIT

## 2018-10-31 PROCEDURE — 3331090002 HH PPS REVENUE DEBIT

## 2018-11-01 ENCOUNTER — HOME CARE VISIT (OUTPATIENT)
Dept: SCHEDULING | Facility: HOME HEALTH | Age: 63
End: 2018-11-01
Payer: MEDICARE

## 2018-11-01 VITALS
DIASTOLIC BLOOD PRESSURE: 92 MMHG | OXYGEN SATURATION: 98 % | HEART RATE: 95 BPM | RESPIRATION RATE: 18 BRPM | SYSTOLIC BLOOD PRESSURE: 140 MMHG

## 2018-11-01 PROCEDURE — G0152 HHCP-SERV OF OT,EA 15 MIN: HCPCS

## 2018-11-01 PROCEDURE — 3331090001 HH PPS REVENUE CREDIT

## 2018-11-01 PROCEDURE — 3331090002 HH PPS REVENUE DEBIT

## 2018-11-01 PROCEDURE — G0300 HHS/HOSPICE OF LPN EA 15 MIN: HCPCS

## 2018-11-02 ENCOUNTER — HOME CARE VISIT (OUTPATIENT)
Dept: SCHEDULING | Facility: HOME HEALTH | Age: 63
End: 2018-11-02
Payer: MEDICARE

## 2018-11-02 VITALS
SYSTOLIC BLOOD PRESSURE: 143 MMHG | RESPIRATION RATE: 17 BRPM | HEART RATE: 89 BPM | DIASTOLIC BLOOD PRESSURE: 87 MMHG | OXYGEN SATURATION: 92 %

## 2018-11-02 VITALS
DIASTOLIC BLOOD PRESSURE: 74 MMHG | TEMPERATURE: 97 F | HEART RATE: 94 BPM | SYSTOLIC BLOOD PRESSURE: 118 MMHG | RESPIRATION RATE: 20 BRPM | OXYGEN SATURATION: 98 %

## 2018-11-02 PROCEDURE — 3331090001 HH PPS REVENUE CREDIT

## 2018-11-02 PROCEDURE — G0157 HHC PT ASSISTANT EA 15: HCPCS

## 2018-11-02 PROCEDURE — 3331090002 HH PPS REVENUE DEBIT

## 2018-11-03 PROCEDURE — 3331090002 HH PPS REVENUE DEBIT

## 2018-11-03 PROCEDURE — 3331090001 HH PPS REVENUE CREDIT

## 2018-11-04 PROCEDURE — 3331090002 HH PPS REVENUE DEBIT

## 2018-11-04 PROCEDURE — 3331090001 HH PPS REVENUE CREDIT

## 2018-11-05 ENCOUNTER — DOCUMENTATION ONLY (OUTPATIENT)
Dept: FAMILY MEDICINE CLINIC | Age: 63
End: 2018-11-05

## 2018-11-05 PROCEDURE — 3331090002 HH PPS REVENUE DEBIT

## 2018-11-05 PROCEDURE — 3331090001 HH PPS REVENUE CREDIT

## 2018-11-06 ENCOUNTER — HOME CARE VISIT (OUTPATIENT)
Dept: SCHEDULING | Facility: HOME HEALTH | Age: 63
End: 2018-11-06
Payer: MEDICARE

## 2018-11-06 VITALS
SYSTOLIC BLOOD PRESSURE: 136 MMHG | RESPIRATION RATE: 17 BRPM | DIASTOLIC BLOOD PRESSURE: 80 MMHG | HEART RATE: 108 BPM | OXYGEN SATURATION: 97 %

## 2018-11-06 PROCEDURE — 3331090002 HH PPS REVENUE DEBIT

## 2018-11-06 PROCEDURE — G0157 HHC PT ASSISTANT EA 15: HCPCS

## 2018-11-06 PROCEDURE — 3331090001 HH PPS REVENUE CREDIT

## 2018-11-07 ENCOUNTER — HOME CARE VISIT (OUTPATIENT)
Dept: HOME HEALTH SERVICES | Facility: HOME HEALTH | Age: 63
End: 2018-11-07
Payer: MEDICARE

## 2018-11-07 PROCEDURE — 3331090001 HH PPS REVENUE CREDIT

## 2018-11-07 PROCEDURE — 3331090002 HH PPS REVENUE DEBIT

## 2018-11-08 ENCOUNTER — HOME CARE VISIT (OUTPATIENT)
Dept: SCHEDULING | Facility: HOME HEALTH | Age: 63
End: 2018-11-08
Payer: MEDICARE

## 2018-11-08 ENCOUNTER — DOCUMENTATION ONLY (OUTPATIENT)
Dept: FAMILY MEDICINE CLINIC | Age: 63
End: 2018-11-08

## 2018-11-08 VITALS
TEMPERATURE: 98.2 F | RESPIRATION RATE: 18 BRPM | OXYGEN SATURATION: 98 % | HEART RATE: 88 BPM | DIASTOLIC BLOOD PRESSURE: 88 MMHG | SYSTOLIC BLOOD PRESSURE: 138 MMHG

## 2018-11-08 PROCEDURE — G0152 HHCP-SERV OF OT,EA 15 MIN: HCPCS

## 2018-11-08 PROCEDURE — 3331090002 HH PPS REVENUE DEBIT

## 2018-11-08 PROCEDURE — 3331090001 HH PPS REVENUE CREDIT

## 2018-11-08 PROCEDURE — G0157 HHC PT ASSISTANT EA 15: HCPCS

## 2018-11-09 ENCOUNTER — DOCUMENTATION ONLY (OUTPATIENT)
Dept: FAMILY MEDICINE CLINIC | Age: 63
End: 2018-11-09

## 2018-11-09 VITALS
DIASTOLIC BLOOD PRESSURE: 94 MMHG | TEMPERATURE: 98.1 F | RESPIRATION RATE: 16 BRPM | SYSTOLIC BLOOD PRESSURE: 160 MMHG | HEART RATE: 107 BPM | OXYGEN SATURATION: 97 %

## 2018-11-09 PROCEDURE — 3331090002 HH PPS REVENUE DEBIT

## 2018-11-09 PROCEDURE — 3331090001 HH PPS REVENUE CREDIT

## 2018-11-10 PROCEDURE — 3331090002 HH PPS REVENUE DEBIT

## 2018-11-10 PROCEDURE — 3331090001 HH PPS REVENUE CREDIT

## 2018-11-11 PROCEDURE — 3331090002 HH PPS REVENUE DEBIT

## 2018-11-11 PROCEDURE — 3331090001 HH PPS REVENUE CREDIT

## 2018-11-12 PROCEDURE — 3331090002 HH PPS REVENUE DEBIT

## 2018-11-12 PROCEDURE — 3331090001 HH PPS REVENUE CREDIT

## 2018-11-13 ENCOUNTER — HOME CARE VISIT (OUTPATIENT)
Dept: SCHEDULING | Facility: HOME HEALTH | Age: 63
End: 2018-11-13
Payer: MEDICARE

## 2018-11-13 PROCEDURE — 3331090001 HH PPS REVENUE CREDIT

## 2018-11-13 PROCEDURE — 3331090002 HH PPS REVENUE DEBIT

## 2018-11-14 ENCOUNTER — HOME CARE VISIT (OUTPATIENT)
Dept: SCHEDULING | Facility: HOME HEALTH | Age: 63
End: 2018-11-14
Payer: MEDICARE

## 2018-11-14 PROCEDURE — 3331090001 HH PPS REVENUE CREDIT

## 2018-11-14 PROCEDURE — 3331090002 HH PPS REVENUE DEBIT

## 2018-11-15 ENCOUNTER — HOME CARE VISIT (OUTPATIENT)
Dept: HOME HEALTH SERVICES | Facility: HOME HEALTH | Age: 63
End: 2018-11-15
Payer: MEDICARE

## 2018-11-15 ENCOUNTER — HOME CARE VISIT (OUTPATIENT)
Dept: SCHEDULING | Facility: HOME HEALTH | Age: 63
End: 2018-11-15
Payer: MEDICARE

## 2018-11-15 PROCEDURE — 3331090003 HH PPS REVENUE ADJ

## 2018-11-15 PROCEDURE — 3331090002 HH PPS REVENUE DEBIT

## 2018-11-15 PROCEDURE — 3331090001 HH PPS REVENUE CREDIT

## 2018-11-16 PROCEDURE — 3331090001 HH PPS REVENUE CREDIT

## 2018-11-16 PROCEDURE — 3331090002 HH PPS REVENUE DEBIT

## 2018-11-17 PROCEDURE — 3331090002 HH PPS REVENUE DEBIT

## 2018-11-17 PROCEDURE — 3331090001 HH PPS REVENUE CREDIT

## 2018-11-18 PROCEDURE — 3331090002 HH PPS REVENUE DEBIT

## 2018-11-18 PROCEDURE — 3331090001 HH PPS REVENUE CREDIT

## 2018-11-19 PROCEDURE — 3331090002 HH PPS REVENUE DEBIT

## 2018-11-19 PROCEDURE — 3331090001 HH PPS REVENUE CREDIT

## 2018-11-20 ENCOUNTER — TELEPHONE (OUTPATIENT)
Dept: FAMILY MEDICINE CLINIC | Age: 63
End: 2018-11-20

## 2018-11-20 PROCEDURE — 3331090001 HH PPS REVENUE CREDIT

## 2018-11-20 PROCEDURE — 3331090002 HH PPS REVENUE DEBIT

## 2018-11-20 NOTE — TELEPHONE ENCOUNTER
Penelope William with [employer] called in reference to a suspicious claim they received for a form sent to primary care pharmacy on 10/31/18. They just wanted to verify if a diabetic form was sent on this date. Penelope William can be reached on her direct line at [phone].

## 2018-11-21 PROCEDURE — 3331090002 HH PPS REVENUE DEBIT

## 2018-11-21 PROCEDURE — 3331090001 HH PPS REVENUE CREDIT

## 2018-11-21 NOTE — TELEPHONE ENCOUNTER
Please advise that we did complete a form for primary care pharmacy. Please determine what was concerning regarding the form.

## 2018-11-22 PROCEDURE — 3331090001 HH PPS REVENUE CREDIT

## 2018-11-22 PROCEDURE — 3331090002 HH PPS REVENUE DEBIT

## 2018-11-23 PROCEDURE — 3331090001 HH PPS REVENUE CREDIT

## 2018-11-23 PROCEDURE — 3331090002 HH PPS REVENUE DEBIT

## 2018-11-24 PROCEDURE — 3331090002 HH PPS REVENUE DEBIT

## 2018-11-24 PROCEDURE — 3331090001 HH PPS REVENUE CREDIT

## 2018-11-25 PROCEDURE — 3331090002 HH PPS REVENUE DEBIT

## 2018-11-25 PROCEDURE — 3331090001 HH PPS REVENUE CREDIT

## 2018-11-27 ENCOUNTER — DOCUMENTATION ONLY (OUTPATIENT)
Dept: FAMILY MEDICINE CLINIC | Age: 63
End: 2018-11-27

## 2018-11-29 NOTE — TELEPHONE ENCOUNTER
I have attempted to contact Christina Rose by phone with the following results: message left to return my call to the office.

## 2018-12-03 NOTE — TELEPHONE ENCOUNTER
I have attempted without success to contact Brandon by phone to return their call. Voice mail message left to return call to the office. If caller should return the call please inform.

## 2018-12-13 ENCOUNTER — DOCUMENTATION ONLY (OUTPATIENT)
Dept: FAMILY MEDICINE CLINIC | Age: 63
End: 2018-12-13

## 2018-12-17 ENCOUNTER — DOCUMENTATION ONLY (OUTPATIENT)
Dept: FAMILY MEDICINE CLINIC | Age: 63
End: 2018-12-17

## 2018-12-20 ENCOUNTER — TELEPHONE (OUTPATIENT)
Dept: FAMILY MEDICINE CLINIC | Age: 63
End: 2018-12-20

## 2018-12-20 ENCOUNTER — DOCUMENTATION ONLY (OUTPATIENT)
Dept: FAMILY MEDICINE CLINIC | Age: 63
End: 2018-12-20

## 2018-12-20 NOTE — PROGRESS NOTES
Medication refill for diclofenac, naproxen, flucinonide and folika-D was declined and faxed on 12/19/18 to [phone] and placed in scan.

## 2018-12-20 NOTE — TELEPHONE ENCOUNTER
Nickie calling to see if pt is supposed to be on RX for calcipotriene topical cream. She states a fax was received in November with 's signature approving RX order to [facility]. I advised her that I did not see medication listed on file for pt. If pt does not need RX she asked to notify [address] at [phone] and then contact her directly at [phone]. I did advise her Sandhya Amy is out of the office until 12/24/18. She verbalized understanding.

## 2018-12-27 ENCOUNTER — DOCUMENTATION ONLY (OUTPATIENT)
Dept: FAMILY MEDICINE CLINIC | Age: 63
End: 2018-12-27

## 2018-12-27 NOTE — TELEPHONE ENCOUNTER
Call placed to pharmacy and informed pharmacy staff the Rx is not needed. Requested call then placed to [facility], I have attempted to contact this patient by phone with the following results: confidential message left informing her pharmacy has been contacted and informed Rx is not needed.

## 2019-01-04 ENCOUNTER — DOCUMENTATION ONLY (OUTPATIENT)
Dept: FAMILY MEDICINE CLINIC | Age: 64
End: 2019-01-04

## 2019-01-17 ENCOUNTER — DOCUMENTATION ONLY (OUTPATIENT)
Dept: FAMILY MEDICINE CLINIC | Age: 64
End: 2019-01-17

## 2019-07-15 ENCOUNTER — OFFICE VISIT (OUTPATIENT)
Dept: FAMILY MEDICINE CLINIC | Age: 64
End: 2019-07-15

## 2019-07-15 VITALS
RESPIRATION RATE: 20 BRPM | HEIGHT: 67 IN | BODY MASS INDEX: 40.02 KG/M2 | TEMPERATURE: 98 F | OXYGEN SATURATION: 94 % | DIASTOLIC BLOOD PRESSURE: 85 MMHG | WEIGHT: 255 LBS | SYSTOLIC BLOOD PRESSURE: 150 MMHG | HEART RATE: 127 BPM

## 2019-07-15 DIAGNOSIS — G47.33 OSA (OBSTRUCTIVE SLEEP APNEA): ICD-10-CM

## 2019-07-15 DIAGNOSIS — Z79.4 TYPE 2 DIABETES MELLITUS WITHOUT COMPLICATION, WITH LONG-TERM CURRENT USE OF INSULIN (HCC): Primary | ICD-10-CM

## 2019-07-15 DIAGNOSIS — I48.19 PERSISTENT ATRIAL FIBRILLATION (HCC): ICD-10-CM

## 2019-07-15 DIAGNOSIS — E11.9 TYPE 2 DIABETES MELLITUS WITHOUT COMPLICATION, WITH LONG-TERM CURRENT USE OF INSULIN (HCC): Primary | ICD-10-CM

## 2019-07-15 DIAGNOSIS — E03.9 ACQUIRED HYPOTHYROIDISM: ICD-10-CM

## 2019-07-15 RX ORDER — INSULIN GLARGINE 100 [IU]/ML
20 INJECTION, SOLUTION SUBCUTANEOUS
Qty: 1 ADJUSTABLE DOSE PRE-FILLED PEN SYRINGE | Refills: 11
Start: 2019-07-15 | End: 2019-10-29

## 2019-07-15 NOTE — PROGRESS NOTES
Chief Complaint   Patient presents with   [address]     PCP     she is a 75y.o. year old female who presents for evalution. Here to establish care  She has uncontrolled DM type 2, she eats a lot of junk food  She does not drink sodas  Does not exercise       Reviewed PmHx, RxHx, FmHx, SocHx, AllgHx and updated and dated in the chart. Aspirin yes ____   No____ N/A____    Patient Active Problem List    Diagnosis    Acquired hypothyroidism    Type [facility] diabetes [facility]    Severe obesity (BMI 35.0-39. 9)    Asthma    Diabetes ([facility]    [facility]    A-fib [facility]    Supraorbital headache    Isthmica nodosa salpingitis    Anxiety    Essential and other specified forms of tremor       Nurse notes were reviewed and copied and are correct  Review of Systems - negative except as listed above in the HPI    Objective:     Vitals:    07/15/19 1435   BP: 150/85   Pulse: (!) 127   Resp: 20   Temp: 98 °F (36.7 °C)   TempSrc: Oral   SpO2: 94%   Weight: 255 lb (115.7 kg)   Height: 5' 7\" (1.702 m)       Physical Examination: General appearance - alert, well appearing, and in no distress  Mental status - alert, oriented to person, place, and time  Mouth - mucous membranes moist, pharynx normal without lesions  Neck - supple, no significant adenopathy  Chest - clear to auscultation, no wheezes, rales or rhonchi, symmetric air entry  Heart - normal rate, regular rhythm, normal S1, S2, no murmurs, rubs, clicks or gallops      Assessment/ Plan:   Diagnoses and all orders for this visit:    1. Type 2 diabetes mellitus without complication, with long-term current use of insulin (HCC)  -     insulin aspart, niacinamide, (FIASP FLEXTOUCH U-100 INSULIN) 100 unit/mL (3 mL) inpn; 12 Units by SubCUTAneous route two (2) times a day.  sliding scale  -     dulaglutide (TRULICITY) 9.85 TA/2.7 mL sub-q pen; 0.5 mL by SubCUTAneous route every seven (7) days.  -     HEMOGLOBIN A1C WITH EAG  Avoid sweets and carbs  Stop the short acting insulin  Use trulicity  Start exercise at least 3 days a week in pool  recheck 1-2 weeks    2. Persistent atrial fibrillation (HCC)  Use the cpap  3. KATELYNN (obstructive sleep apnea)  Use the cpap  4. Acquired hypothyroidism    Other orders  -     insulin glargine (LANTUS,BASAGLAR) 100 unit/mL (3 mL) inpn; 20 Units by SubCUTAneous route daily (after dinner). ICD-10-CM ICD-9-CM    1. Type 2 diabetes mellitus without complication, with long-term current use of insulin (HCC) E11.9 250.00 insulin aspart, niacinamide, (FIASP FLEXTOUCH U-100 INSULIN) 100 unit/mL (3 mL) inpn    Z79.4 V58.67 dulaglutide (TRULICITY) 5.18 JU/0.3 mL sub-q pen      HEMOGLOBIN A1C WITH EAG   2. Persistent atrial fibrillation (HCC) I48.1 427.31    3. KATELYNN (obstructive sleep apnea) G47.33 327.23 SLEEP MEDICINE REFERRAL   4. Acquired hypothyroidism E03.9 244.9        I have discussed the diagnosis with the patient and the intended plan as seen in the above orders. The patient has received an after-visit summary and questions were answered concerning future plans. Medication Side Effects and Warnings were discussed with patient: yes  Patient Labs were reviewed and or requested: yes  Patient Past Records were reviewed and or requested: yes        There are no Patient Instructions on file for this visit.     The patient verbalizes understanding and agrees with the plan of care        Patient has the advanced directives booklet to review

## 2019-07-15 NOTE — PROGRESS NOTES
1. Have you been to the ER, urgent care clinic since your last visit? Hospitalized since your last visit? [facility] on 7/13/19 for knee pain due to falls. 2. Have you seen or consulted any other health care providers outside of the [address] [place] [address] since your last visit? Include any pap smears or colon screening.  Cardiology    Chief Complaint   Patient presents with   [facility].     PCP

## 2019-07-16 LAB
EST. AVERAGE GLUCOSE BLD GHB EST-MCNC: 240 MG/DL
HBA1C MFR BLD: 10 % (ref 4.8–5.6)

## 2019-07-19 ENCOUNTER — HOSPITAL ENCOUNTER (OUTPATIENT)
Dept: CT IMAGING | Age: 64
Discharge: HOME | End: 2019-07-19
Attending: INTERNAL MEDICINE
Payer: MEDICARE

## 2019-07-19 DIAGNOSIS — K59.00 CONSTIPATION: ICD-10-CM

## 2019-07-19 DIAGNOSIS — R10.9 ABDOMINAL PAIN: ICD-10-CM

## 2019-07-19 LAB — CREAT BLD-MCNC: 0.7 MG/DL (ref 0.6–1.3)

## 2019-07-19 PROCEDURE — 74011636320 HC RX REV CODE- 636/320: Performed by: RADIOLOGY

## 2019-07-19 PROCEDURE — 74177 CT ABD & PELVIS W/CONTRAST: CPT

## 2019-07-19 PROCEDURE — 82565 ASSAY OF CREATININE: CPT

## 2019-07-19 RX ADMIN — IOPAMIDOL 100 ML: 755 INJECTION, SOLUTION INTRAVENOUS at 11:30

## 2019-07-22 NOTE — PROGRESS NOTES
The blood sugar is poorly controlled. I am hopeful that you changing some eating habits and starting to exercise a least 150 mins a week will help the trulicity to get the blood sugar under control.

## 2019-07-29 ENCOUNTER — OFFICE VISIT (OUTPATIENT)
Dept: FAMILY MEDICINE CLINIC | Age: 64
End: 2019-07-29

## 2019-07-29 VITALS
WEIGHT: 247.8 LBS | BODY MASS INDEX: 38.89 KG/M2 | SYSTOLIC BLOOD PRESSURE: 125 MMHG | HEIGHT: 67 IN | OXYGEN SATURATION: 98 % | RESPIRATION RATE: 19 BRPM | TEMPERATURE: 98.1 F | DIASTOLIC BLOOD PRESSURE: 82 MMHG | HEART RATE: 122 BPM

## 2019-07-29 DIAGNOSIS — Z79.4 TYPE 2 DIABETES MELLITUS WITHOUT COMPLICATION, WITH LONG-TERM CURRENT USE OF INSULIN (HCC): Primary | ICD-10-CM

## 2019-07-29 DIAGNOSIS — E66.9 OBESITY, CLASS II, BMI 35-39.9: ICD-10-CM

## 2019-07-29 DIAGNOSIS — E11.9 TYPE 2 DIABETES MELLITUS WITHOUT COMPLICATION, WITH LONG-TERM CURRENT USE OF INSULIN (HCC): Primary | ICD-10-CM

## 2019-07-29 DIAGNOSIS — E03.9 ACQUIRED HYPOTHYROIDISM: ICD-10-CM

## 2019-07-29 DIAGNOSIS — G47.33 OSA (OBSTRUCTIVE SLEEP APNEA): ICD-10-CM

## 2019-07-29 DIAGNOSIS — I48.19 PERSISTENT ATRIAL FIBRILLATION (HCC): ICD-10-CM

## 2019-07-29 RX ORDER — ALBUTEROL SULFATE 90 UG/1
2 AEROSOL, METERED RESPIRATORY (INHALATION)
Qty: 1 INHALER | Refills: 5 | Status: SHIPPED | OUTPATIENT
Start: 2019-07-29

## 2019-07-29 NOTE — PROGRESS NOTES
Weight Loss Progress Note: follow up Physician Visit      Emmanuel Alaniz is a 75 y.o. female with BMI ,38.81  who is here for her follow up  Evaluation for the medical bariatric care. CC: I want to be healthy    She has a pacemaker and her heart rate is 12os  Her gastroenterologist just started gher on linzess  She is also eating a lot of vegetables  She has lost 7 lbs    Taking 16 units basaglar in pm not taking any short acting with meals and taking trulicity  She feels a lot better now    She is still eating sweets but \"not much\" . Had ice cream yesterday  She does that once a week  She wants to start swimming at the [facility] but cannot drive herself  Her fasting is 148 most times now    KATELYNN  She feels that she is not getting enough pressure on the cpap  She has an appt in sleep med in 2 weeks        Weight History  Current weight  247 and BMI is Body mass index is 38.81 kg/m². Goal weight BMI 28   Highest weight 247  (See weight gain time line scanned into media section of chart)        Significant Medical History    Update on sleep apnea and  CPAP not using hers    Ever had bariatric surgery: no    Pregnant or planning on becoming pregnant w/in 6 months: no         Significant Psychosocial History   Any history of drug abuse or dependence: no  Current Major Lifestyle Changes: no  Any potential unsupportive people: no          Social History  Social History     Tobacco Use    Smoking status: Never Smoker    Smokeless tobacco: Never Used   Substance Use Topics    Alcohol use: No     How many times a week do you eat out?  0    Do you drink any EtOH?  no   If so, how much? Do you have upcoming any travel in the next 6 weeks?  no   If so, what do you have planned?           Exercise  How many days a week do you currently exercise?  0 days  Have you ever been told by a physician not to exercise?  no      Objective  Visit Vitals  /82   Pulse (!) 122   Temp 98.1 °F (36.7 °C) (Oral)   Resp 19   Ht 5' 7\" (1.702 m)   Wt 247 lb 12.8 oz (112.4 kg)   SpO2 98%   BMI 38.81 kg/m²       Bicep - (right ) circumference  Waist Circumference: See Weight Management Doc Flowsheet  Neck Circumference: See Weight Management Doc Flowsheet  Percent Body Fat: See Weight Management Doc Flowsheet  Weight Metrics 7/29/2019 7/29/2019 7/15/2019 10/16/2018 10/2/2018 9/26/2018 9/25/2018   Weight - 247 lb 12.8 oz 255 lb 237 lb 237 lb 238 lb 6.4 oz 242 lb   Waist Measure Inches 49 - - - - - -   BMI - 38.81 kg/m2 39.94 kg/m2 37.12 kg/m2 37.12 kg/m2 37.34 kg/m2 37.9 kg/m2         Labs:       Review of Systems  Complete ROS negative except where noted above      Physical Exam    Vital Signs Reviewed  Weight Management Metrics Reviewed    Vital Signs Reviewed  Appearance: Obese, A&O x 3, NAD  HEENT:  NC/AT, EOMI, PERRL, No scleral icterus, malampatti score:  Skin:    Skin tags - no   Acanthosis Nigricans - no  Neck:  No nodes, thyromegaly   Heart:  RRR without M/R/G  Lungs:  CTAB, no rhonchi, rales, or wheezes with good air exchange   Abdomen:  Non-tender, pos bowel sounds; hepatomegaly -   Ext:  No C/C/E        Assessment & Plan  Diagnoses and all orders for this visit:    1. Type 2 diabetes mellitus without complication, with long-term current use of insulin (HCC)  The a1c is 10, very much out of control  The changes here will make this improve  Start trulicity and decrease insulin , cut in half. Stop insulin when blood sugars get below 120  2. Persistent atrial fibrillation ([facility]  Followed in cardiology  3. Obesity, Class II, BMI 35-39.9  Diet:low carb, we discussed proper changes to lower carbs in diet    Activity:try to increase activity to at least 150 mins per week    Medication: aiming to get off of insulin    4. KATELYNN (obstructive sleep apnea)  Working on getting the cpap settings changed  5. Acquired hypothyroidism    Other orders  -     albuterol (PROVENTIL HFA, VENTOLIN HFA, PROAIR HFA) 90 mcg/actuation inhaler;  Take 2 Puffs by inhalation every four (4) hours as needed for Shortness of Breath or Wheezing. Order states ever 4-6 hours  -     linaCLOtide (LINZESS) 72 mcg cap capsule; Take 1 Cap by mouth daily. Based on his history and exam, Salima Mcmahon is a good candidate for the  [facility]         There are no Patient Instructions on file for this visit. Follow-up and Dispositions    · Return in about 3 months (around 10/29/2019). Over 50% of the 30 minutes face to face time with Kirt Sheron consisted of counseling & coordinating and/or discussing treatment plans in reference to her obesity The primary encounter diagnosis was Type 2 diabetes mellitus without complication, with long-term current use of insulin ([address] Diagnoses of Persistent atrial fibrillation ([address] Obesity, Class II, BMI 35-39.9, KATELYNN (obstructive sleep apnea), and Acquired hypothyroidism were also pertinent to this visit.   The patient verbalizes understanding and agrees with the plan of care    Patient has the advanced directives  booklet to review

## 2019-07-29 NOTE — PROGRESS NOTES
1. Have you been to the ER, urgent care clinic since your last visit? Hospitalized since your last visit? No    2. Have you seen or consulted any other health care providers outside of the [address] [place] [address] since your last visit? Include any pap smears or colon screening.  No    Chief Complaint   Patient presents with    Weight Management     Body Weight: 247.8  Body Fat%: 46.7  Muscle Mass Weight: 38.5  Body Water Weight: 76.7  Basal Metabolic Rate: 1374  BMI: 38.81

## 2019-08-22 DIAGNOSIS — Z12.39 SCREENING FOR BREAST CANCER: Primary | ICD-10-CM

## 2019-08-29 ENCOUNTER — TELEPHONE (OUTPATIENT)
Dept: FAMILY MEDICINE CLINIC | Age: 64
End: 2019-08-29

## 2019-08-29 NOTE — TELEPHONE ENCOUNTER
LVM advising pt we had received a form for diabetic shoes that was sent from [facility]. Advised pt per Dr. Kadeem Zeng she would need to be seen in office to have foot exam completed prior to approving. Requested pt to contact the office back to schedule OV.

## 2019-09-05 ENCOUNTER — OFFICE VISIT (OUTPATIENT)
Dept: FAMILY MEDICINE CLINIC | Age: 64
End: 2019-09-05

## 2019-09-05 VITALS
BODY MASS INDEX: 38.81 KG/M2 | DIASTOLIC BLOOD PRESSURE: 79 MMHG | HEART RATE: 87 BPM | SYSTOLIC BLOOD PRESSURE: 122 MMHG | OXYGEN SATURATION: 95 % | RESPIRATION RATE: 19 BRPM | TEMPERATURE: 98.5 F | HEIGHT: 67 IN

## 2019-09-05 DIAGNOSIS — Z79.4 TYPE 2 DIABETES MELLITUS WITHOUT COMPLICATION, WITH LONG-TERM CURRENT USE OF INSULIN (HCC): ICD-10-CM

## 2019-09-05 DIAGNOSIS — Z23 ENCOUNTER FOR IMMUNIZATION: ICD-10-CM

## 2019-09-05 DIAGNOSIS — G47.33 OSA (OBSTRUCTIVE SLEEP APNEA): Primary | ICD-10-CM

## 2019-09-05 DIAGNOSIS — E11.9 TYPE 2 DIABETES MELLITUS WITHOUT COMPLICATION, WITH LONG-TERM CURRENT USE OF INSULIN (HCC): ICD-10-CM

## 2019-09-05 RX ORDER — CHLORHEXIDINE GLUCONATE 1.2 MG/ML
RINSE ORAL
COMMUNITY
Start: 2019-09-04

## 2019-09-05 RX ORDER — LIDOCAINE HYDROCHLORIDE 20 MG/ML
SOLUTION ORAL; TOPICAL
COMMUNITY
Start: 2019-09-04

## 2019-09-05 NOTE — PROGRESS NOTES
1. Have you been to the ER, urgent care clinic since your last visit? Hospitalized since your last visit? No    2. Have you seen or consulted any other health care providers outside of the [address] [place] [address] since your last visit? Include any pap smears or colon screening.  No    Chief Complaint   Patient presents with    Diabetic Foot Exam

## 2019-09-05 NOTE — PROGRESS NOTES
Chief Complaint   Patient presents with    Diabetic Foot Exam     she is a 75y.o. year old female who presents for evalution. Here for diabetic foot check  She needs diabetic shoes and this requires a form be completed    Reviewed PmHx, RxHx, FmHx, SocHx, AllgHx and updated and dated in the chart. Aspirin yes ____   No____ N/A____    Patient Active Problem List    Diagnosis    Acquired hypothyroidism    Type 2 diabetes with nephropathy ([facility]    Severe obesity (BMI 35.0-39. 9)    Asthma    Diabetes ([facility]    Factor V Leiden ([facility]    A-fib ([facility]    Supraorbital headache    Isthmica nodosa salpingitis    Anxiety    Essential and other specified forms of tremor       Nurse notes were reviewed and copied and are correct  Review of Systems - negative except as listed above in the HPI    Objective:     Vitals:    09/05/19 1342   BP: 122/79   Pulse: 87   Resp: 19   Temp: 98.5 °F (36.9 °C)   TempSrc: Oral   SpO2: 95%   Height: 5' 7\" (1.702 m)       Physical Examination: General appearance - alert, well appearing, and in no distress  Mental status - alert, oriented to person, place, and time  Musculoskeletal - left 2nd toe -hammer toe, 5th toe red in 2 spots, right foot is normal    Sensory exam of the foot is normal, tested with the monofilament. Good pulses, ulcers as mentioned above, good peripheral pulses. Assessment/ Plan:   Diagnoses and all orders for this visit:    1. KATELYNN (obstructive sleep apnea)  Using the cpap  2. Type 2 diabetes mellitus without complication, with long-term current use of insulin ([facility])  -     dulaglutide (TRULICITY) 9.34 BQ/1.7 mL sub-q pen; 0.5 mL by SubCUTAneous route every seven (7) days. Filling form out for diabetic shoes. She has pre-ulcer lesion on the 5th toe and a hammertoe as well on the left foot. diabetc shoes are needed. Do medicare wellness asap. She will schedule that today  Follow-up and Dispositions    · Return for medicare wellnes.          ICD-10-CM ICD-9-CM    1. KATELYNN (obstructive sleep apnea) G47.33 327.23    2. Type 2 diabetes mellitus without complication, with long-term current use of insulin (HCC) E11.9 250.00 dulaglutide (TRULICITY) 5.37 HF/3.6 mL sub-q pen    Z79.4 V58.67 MICROALBUMIN, UR, RAND W/ MICROALB/CREAT RATIO   3. Encounter for immunization Z23 V03.89 INFLUENZA VIRUS VACCINE, HIGH DOSE SEASONAL, PRESERVATIVE FREE       I have discussed the diagnosis with the patient and the intended plan as seen in the above orders. The patient has received an after-visit summary and questions were answered concerning future plans. Medication Side Effects and Warnings were discussed with patient: yes  Patient Labs were reviewed and or requested: yes  Patient Past Records were reviewed and or requested: yes        There are no Patient Instructions on file for this visit.     The patient verbalizes understanding and agrees with the plan of care        Patient has the advanced directives booklet to review

## 2019-09-06 LAB
ALBUMIN/CREAT UR: <4.8 MG/G CREAT (ref 0–30)
CREAT UR-MCNC: 63 MG/DL
MICROALBUMIN UR-MCNC: <3 UG/ML

## 2019-09-30 ENCOUNTER — OFFICE VISIT (OUTPATIENT)
Dept: FAMILY MEDICINE CLINIC | Age: 64
End: 2019-09-30

## 2019-09-30 VITALS
HEIGHT: 67 IN | WEIGHT: 243 LBS | OXYGEN SATURATION: 96 % | RESPIRATION RATE: 17 BRPM | DIASTOLIC BLOOD PRESSURE: 83 MMHG | TEMPERATURE: 98.6 F | HEART RATE: 96 BPM | BODY MASS INDEX: 38.14 KG/M2 | SYSTOLIC BLOOD PRESSURE: 124 MMHG

## 2019-09-30 DIAGNOSIS — Z71.89 ADVANCED DIRECTIVES, COUNSELING/DISCUSSION: ICD-10-CM

## 2019-09-30 DIAGNOSIS — E11.21 TYPE 2 DIABETES WITH NEPHROPATHY (HCC): ICD-10-CM

## 2019-09-30 DIAGNOSIS — Z00.00 MEDICARE ANNUAL WELLNESS VISIT, SUBSEQUENT: Primary | ICD-10-CM

## 2019-09-30 DIAGNOSIS — Z11.59 NEED FOR HEPATITIS C SCREENING TEST: ICD-10-CM

## 2019-09-30 DIAGNOSIS — R25.1 TREMOR: ICD-10-CM

## 2019-09-30 DIAGNOSIS — Z23 ENCOUNTER FOR IMMUNIZATION: ICD-10-CM

## 2019-09-30 NOTE — PATIENT INSTRUCTIONS
Medicare Wellness Visit, Female The best way to live healthy is to have a lifestyle where you eat a well-balanced diet, exercise regularly, limit alcohol use, and quit all forms of tobacco/nicotine, if applicable. Regular preventive services are another way to keep healthy. Preventive services (vaccines, screening tests, monitoring & exams) can help personalize your care plan, which helps you manage your own care. Screening tests can find health problems at the earliest stages, when they are easiest to treat. [facility] follows the current, evidence-based guidelines published by the [facility] Justino Austin ([facility]) when recommending preventive services for our patients. Because we follow these guidelines, sometimes recommendations change over time as research supports it. (For example, mammograms used to be recommended annually. Even though Medicare will still pay for an annual mammogram, the newer guidelines recommend a mammogram every two years for women of average risk.) Of course, you and your doctor may decide to screen more often for some diseases, based on your risk and your health status. Preventive services for you include: - Medicare offers their members a free annual wellness visit, which is time for you and your primary care provider to discuss and plan for your preventive service needs. Take advantage of this benefit every year! 
-All adults over the age of 72 should receive the recommended pneumonia vaccines. Current USPSTF guidelines recommend a series of two vaccines for the best pneumonia protection.  
-All adults should have a flu vaccine yearly and a tetanus vaccine every 10 years. All adults age 61 and older should receive a shingles vaccine once in their lifetime.   
-A bone mass density test is recommended when a woman turns 65 to screen for osteoporosis. This test is only recommended one time, as a screening. Some providers will use this same test as a disease monitoring tool if you already have osteoporosis. -All adults age 38-68 who are overweight should have a diabetes screening test once every three years.  
-Other screening tests and preventive services for persons with diabetes include: an eye exam to screen for diabetic retinopathy, a kidney function test, a foot exam, and stricter control over your cholesterol.  
-Cardiovascular screening for adults with routine risk involves an electrocardiogram (ECG) at intervals determined by your doctor.  
-Colorectal cancer screenings should be done for adults age 54-65 with no increased risk factors for colorectal cancer. There are a number of acceptable methods of screening for this type of cancer. Each test has its own benefits and drawbacks. Discuss with your doctor what is most appropriate for you during your annual wellness visit. The different tests include: colonoscopy (considered the best screening method), a fecal occult blood test, a fecal DNA test, and sigmoidoscopy. -Breast cancer screenings are recommended every other year for women of normal risk, age 54-69. 
-Cervical cancer screenings for women over age 72 are only recommended with certain risk factors.  
-All adults born between [facility] should be screened once for Hepatitis C. Here is a list of your current Health Maintenance items (your personalized list of preventive services) with a due date: 
Health Maintenance Due Topic Date Due  Shingles Vaccine (2 of 2) 08/04/2018  Cholesterol Test   09/25/2019

## 2019-09-30 NOTE — PROGRESS NOTES
1. Have you been to the ER, urgent care clinic since your last visit? Hospitalized since your last visit? No    2. Have you seen or consulted any other health care providers outside of the [address] [place] [address] since your last visit? Include any pap smears or colon screening.  No     Chief Complaint   Patient presents with   tna Annual Wellness Visit

## 2019-09-30 NOTE — PROGRESS NOTES
This is the Subsequent Medicare Annual Wellness Exam, performed 12 months or more after the Initial AWV or the last Subsequent AWV    I have reviewed the patient's medical history in detail and updated the computerized patient record. History     Past Medical History:   Diagnosis Date    A-fib [facility])     Anxiety 7/12/2013    Arrhythmia     \"fast heart rate\". Ablation x2    Arthritis     Asthma     Depression     [facility]     Factor V Leiden ([facility]     Isthmica nodosa salpingitis 7/12/2013    Thyroid disease       Past Surgical History:   Procedure Laterality Date    ABDOMEN SURGERY PROC UNLISTED      cholecystectomy    CARDIAC SURG PROCEDURE UNLIST      ablation 2010 x2    HX CHOLECYSTECTOMY      HX HEENT      HX PACEMAKER       Current Outpatient Medications   Medication Sig Dispense Refill    chlorhexidine (PERIDEX) 0.12 % solution       LIDOCAINE VISCOUS 2 % solution       dulaglutide (TRULICITY) 7.66 DD/3.3 mL sub-q pen 0.5 mL by SubCUTAneous route every seven (7) days. 3 Pen 5    albuterol (PROVENTIL HFA, VENTOLIN HFA, PROAIR HFA) 90 mcg/actuation inhaler Take 2 Puffs by inhalation every four (4) hours as needed for Shortness of Breath or Wheezing. Order states ever 4-6 hours 1 Inhaler 5    linaCLOtide (LINZESS) 72 mcg cap capsule Take 1 Cap by mouth daily. 30 Cap 0    insulin glargine (LANTUS,BASAGLAR) 100 unit/mL (3 mL) inpn 20 Units by SubCUTAneous route daily (after dinner). 1 Adjustable Dose Pre-filled Pen Syringe 11    ARIPiprazole (ABILIFY) 30 mg tablet Take 30 mg by mouth daily.  LORazepam (ATIVAN) 1 mg tablet Take 1 mg by mouth every six (6) hours as needed for Anxiety. Please take one tablet at bedtime and one half tablet daily as needed      buPROPion XL (WELLBUTRIN XL) 300 mg XL tablet Take 300 mg by mouth every morning.  cyclobenzaprine (FLEXERIL) 5 mg tablet Take 5 mg by mouth daily as needed for Muscle Spasm(s).       rivaroxaban (XARELTO) 20 mg tab tablet Take 20 mg by mouth daily.  OXcarbazepine (TRILEPTAL) 150 mg tablet Take 150 mg by mouth two (2) times a day.  isosorbide mononitrate ER (IMDUR) 30 mg tablet Take 30 mg by mouth daily.  rOPINIRole (REQUIP) 1 mg tablet Take 1 mg by mouth three (3) times daily.  benzonatate (TESSALON) 100 mg capsule Take 100 mg by mouth three (3) times daily as needed for Cough.  colestipol (COLESTID) 1 gram tablet Take 1 g by mouth two (2) times a day.  levothyroxine (SYNTHROID) 175 mcg tablet TAKE 1 TABLET BY MOUTH DAILY BEFORE BREAKFAST 90 Tab 5    traZODone (DESYREL) 150 mg tablet Take 100-200 mg by mouth nightly.  ondansetron (ZOFRAN ODT) 4 mg disintegrating tablet Take 1 Tab by mouth every eight (8) hours as needed for Nausea.  DULoxetine (CYMBALTA) 60 mg capsule Take 2 Caps by mouth daily.  rosuvastatin (CRESTOR) 40 mg tablet Take 40 mg by mouth nightly.  montelukast (SINGULAIR) 10 mg tablet Take 10 mg by mouth daily.  beclomethasone (QVAR) 80 mcg/actuation aero Take 1 Puff by inhalation two (2) times a day. Allergies   Allergen Reactions    Shellfish Derived Anaphylaxis    Ciprofloxacin Hives    Theophylline Hives     Family History   Problem Relation Age of Onset    Heart Disease Father     Cancer Maternal Grandfather      Social History     Tobacco Use    Smoking status: Never Smoker    Smokeless tobacco: Never Used   Substance Use Topics    Alcohol use: No     Patient Active Problem List   Diagnosis Code    Isthmica nodosa salpingitis N70.11    Anxiety F41.9    Essential and other specified forms of tremor G25.0, G25.2    Supraorbital headache R51    Diabetes ([address] E11.9    Factor V Leiden ([address] D68.51    A-fib ([address] I48.91    Asthma J45.909    Severe obesity (BMI 35.0-39. 9) E66.01    Type 2 diabetes with nephropathy (HCC) E11.21    Acquired hypothyroidism E03.9       Depression Risk Factor Screening:     3 most recent PHQ Screens 9/30/2019   PHQ Not Done Active Diagnosis of Depression or Bipolar Disorder   Little interest or pleasure in doing things -   Feeling down, depressed, irritable, or hopeless -   Total Score PHQ 2 -   Trouble falling or staying asleep, or sleeping too much -   Feeling tired or having little energy -   Poor appetite, weight loss, or overeating -   Feeling bad about yourself - or that you are a failure or have let yourself or your family down -   Trouble concentrating on things such as school, work, reading, or watching TV -   Moving or speaking so slowly that other people could have noticed; or the opposite being so fidgety that others notice -   Thoughts of being better off dead, or hurting yourself in some way -   PHQ 9 Score -   How difficult have these problems made it for you to do your work, take care of your home and get along with others -     Alcohol Risk Factor Screening: You do not drink alcohol or very rarely. Functional Ability and Level of Safety:   Hearing Loss  Hearing is good. Activities of Daily Living  The home contains: no safety equipment. Patient does total self care    Fall Risk  Fall Risk Assessment, last 12 mths 9/5/2019   Able to walk? Yes   Fall in past 12 months? No   Fall with injury? -   Number of falls in past 12 months -   Fall Risk Score -       Abuse Screen  Patient is not abused    Cognitive Screening   Evaluation of Cognitive Function:  Has your family/caregiver stated any concerns about your memory: no  Normal, Clock Drawing test    Patient Care Team   Patient Care Team:  Claudene Boys, MD as PCP - General (Family Practice)    Assessment/Plan   Education and counseling provided:  Are appropriate based on today's review and evaluation  End-of-Life planning (with patient's consent)    Diagnoses and all orders for this visit:    1. Medicare annual wellness visit, subsequent    2. Advanced directives, counseling/discussion  -     ADVANCE CARE PLANNING FIRST 30 MINS    3. Encounter for immunization  -     ADMIN PNEUMOCOCCAL VACCINE  -     PNEUMOCOCCAL CONJ VACCINE 13 VALENT IM  -     ADMIN INFLUENZA VIRUS VAC  -     INFLUENZA VACCINE INACTIVATED (IIV), SUBUNIT, ADJUVANTED, IM    4. Type 2 diabetes with nephropathy (HCC)  -     HEMOGLOBIN A1C WITH EAG    5. Need for hepatitis C screening test  -     HEPATITIS C AB    6. Tremor        Health Maintenance Due   Topic Date Due    Shingrix Vaccine Age 49> (2 of 2) 08/04/2018    LIPID PANEL Q1  09/25/2019     Chief Complaint   Patient presents with    Annual Wellness Visit     she is a 75y.o. year old female who presents for evalution. She has been avoiding sweets and moving more  She has been taking all meds and monitoring multiple times a day  The highest 112, the lowest 86    Reviewed PmHx, RxHx, FmHx, SocHx, AllgHx and updated and dated in the chart. Aspirin yes ____   No____ N/A____    Patient Active Problem List    Diagnosis    Acquired hypothyroidism    [facility]    Severe obesity (BMI 35.0-39. 9)    Asthma    [facility]    [facility] Leiden [facility]    [facility]    Supraorbital headache    Isthmica nodosa salpingitis    Anxiety    Essential and other specified forms of tremor       Nurse notes were reviewed and copied and are correct  Review of Systems - negative except as listed above in the HPI    Objective:     Vitals:    09/30/19 1303   BP: 124/83   Pulse: 96   Resp: 17   Temp: 98.6 °F (37 °C)   TempSrc: Oral   SpO2: 96%   Weight: 243 lb (110.2 kg)   Height: 5' 7\" (1.702 m)     Physical Examination: General appearance - alert, well appearing, and in no distress  Mental status - alert, oriented to person, place, and time  Neck - supple, no significant adenopathy  Chest - clear to auscultation, no wheezes, rales or rhonchi, symmetric air entry  Heart - normal rate, regular rhythm, normal S1, S2, no murmurs, rubs, clicks or gallops  Neurological - alert, oriented, normal speech, resting hand tremor  Extremities - peripheral pulses normal, no pedal edema, no clubbing or cyanosis  Skin - normal coloration and turgor, no rashes, no suspicious skin lesions noted         Assessment/ Plan:   Diagnoses and all orders for this visit:    1. Medicare annual wellness visit, subsequent    2. Advanced directives, counseling/discussion  -     ADVANCE CARE PLANNING FIRST 30 MINS    3. Encounter for immunization  -     ADMIN PNEUMOCOCCAL VACCINE  -     PNEUMOCOCCAL CONJ VACCINE 13 VALENT IM  -     ADMIN INFLUENZA VIRUS VAC  -     INFLUENZA VACCINE INACTIVATED (IIV), SUBUNIT, ADJUVANTED, IM    4. Type 2 diabetes with nephropathy (HCC)  -     HEMOGLOBIN A1C WITH EAG    5. Need for hepatitis C screening test  -     HEPATITIS C AB    6. Tremor           ICD-10-CM ICD-9-CM    1. Medicare annual wellness visit, subsequent Z00.00 V70.0    2. Advanced directives, counseling/discussion Z71.89 V65.49 ADVANCE CARE PLANNING FIRST 30 MINS   3. Encounter for immunization Z23 V03.89 ADMIN PNEUMOCOCCAL VACCINE      PNEUMOCOCCAL CONJ VACCINE 13 VALENT IM      ADMIN INFLUENZA VIRUS VAC      INFLUENZA VACCINE INACTIVATED (IIV), SUBUNIT, ADJUVANTED, IM   4. Type 2 diabetes with nephropathy (HCC) E11.21 250.40 HEMOGLOBIN A1C WITH EAG     583.81    5. Need for hepatitis C screening test Z11.59 V73.89 HEPATITIS C AB   6. Tremor R25.1 781.0        I have discussed the diagnosis with the patient and the intended plan as seen in the above orders. The patient has received an after-visit summary and questions were answered concerning future plans.      Medication Side Effects and Warnings were discussed with patient: yes  Patient Labs were reviewed and or requested: yes  Patient Past Records were reviewed and or requested: yes        Patient Instructions       Medicare Wellness Visit, Female     The best way to live healthy is to have a lifestyle where you eat a well-balanced diet, exercise regularly, limit alcohol use, and quit all forms of tobacco/nicotine, if applicable. Regular preventive services are another way to keep healthy. Preventive services (vaccines, screening tests, monitoring & exams) can help personalize your care plan, which helps you manage your own care. Screening tests can find health problems at the earliest stages, when they are easiest to treat. [facility] follows the current, evidence-based guidelines published by the [facility] Justino Avila ([facility]) when recommending preventive services for our patients. Because we follow these guidelines, sometimes recommendations change over time as research supports it. (For example, mammograms used to be recommended annually. Even though Medicare will still pay for an annual mammogram, the newer guidelines recommend a mammogram every two years for women of average risk.)  Of course, you and your doctor may decide to screen more often for some diseases, based on your risk and your health status. Preventive services for you include:  - Medicare offers their members a free annual wellness visit, which is time for you and your primary care provider to discuss and plan for your preventive service needs. Take advantage of this benefit every year!  -All adults over the age of 72 should receive the recommended pneumonia vaccines. Current USPSTF guidelines recommend a series of two vaccines for the best pneumonia protection.   -All adults should have a flu vaccine yearly and a tetanus vaccine every 10 years. All adults age 61 and older should receive a shingles vaccine once in their lifetime.    -A bone mass density test is recommended when a woman turns 65 to screen for osteoporosis. This test is only recommended one time, as a screening. Some providers will use this same test as a disease monitoring tool if you already have osteoporosis.   -All adults age 38-68 who are overweight should have a diabetes screening test once every three years.   -Other screening tests and preventive services for persons with diabetes include: an eye exam to screen for diabetic retinopathy, a kidney function test, a foot exam, and stricter control over your cholesterol.   -Cardiovascular screening for adults with routine risk involves an electrocardiogram (ECG) at intervals determined by your doctor.   -Colorectal cancer screenings should be done for adults age 54-65 with no increased risk factors for colorectal cancer. There are a number of acceptable methods of screening for this type of cancer. Each test has its own benefits and drawbacks. Discuss with your doctor what is most appropriate for you during your annual wellness visit. The different tests include: colonoscopy (considered the best screening method), a fecal occult blood test, a fecal DNA test, and sigmoidoscopy. -Breast cancer screenings are recommended every other year for women of normal risk, age 54-69.  -Cervical cancer screenings for women over age 72 are only recommended with certain risk factors.   -All adults born between [facility] should be screened once for Hepatitis C.      Here is a list of your current Health Maintenance items (your personalized list of preventive services) with a due date:  Health Maintenance Due   Topic Date Due    Shingles Vaccine (2 of 2) 08/04/2018    Cholesterol Test   09/25/2019             The patient verbalizes understanding and agrees with the plan of care        Patient has the advanced directives booklet to review

## 2019-09-30 NOTE — ACP (ADVANCE CARE PLANNING)
Advance Care Planning    Advance Care Planning (ACP) Provider Conversation Snapshot    Date of ACP Conversation: 09/30/19  Persons included in Conversation:  patient  Length of ACP Conversation in minutes:  16 minutes    Authorized Decision Maker (if patient is incapable of making informed decisions): This person is: Other Legally Authorized Decision Maker (e.g. Next of Kin)            For Patients with Decision Making Capacity:   Values/Goals: Exploration of values, goals, and preferences if recovery is not expected, even with continued medical treatment in the event of:  Imminent death  Severe, permanent brain injury  \"In these circumstances, what matters most to you? \"  Care focused more on comfort or quality of life.     Conversation Outcomes / Follow-Up Plan:   Completed new Advance Directive

## 2019-10-01 LAB
EST. AVERAGE GLUCOSE BLD GHB EST-MCNC: 171 MG/DL
HBA1C MFR BLD: 7.6 % (ref 4.8–5.6)
HCV AB S/CO SERPL IA: <0.1 S/CO RATIO (ref 0–0.9)

## 2019-10-15 NOTE — PROGRESS NOTES
The blood sugar is much better, the a1c went down from 10.0 to 7.6.  keep working on the diet and keep trying to move more.  We will discuss this more on the next visit

## 2019-10-29 ENCOUNTER — OFFICE VISIT (OUTPATIENT)
Dept: FAMILY MEDICINE CLINIC | Age: 64
End: 2019-10-29

## 2019-10-29 VITALS
TEMPERATURE: 98.2 F | BODY MASS INDEX: 37.67 KG/M2 | SYSTOLIC BLOOD PRESSURE: 97 MMHG | DIASTOLIC BLOOD PRESSURE: 65 MMHG | HEIGHT: 67 IN | OXYGEN SATURATION: 96 % | WEIGHT: 240 LBS | RESPIRATION RATE: 17 BRPM | HEART RATE: 93 BPM

## 2019-10-29 DIAGNOSIS — I48.19 PERSISTENT ATRIAL FIBRILLATION (HCC): ICD-10-CM

## 2019-10-29 DIAGNOSIS — E03.9 ACQUIRED HYPOTHYROIDISM: ICD-10-CM

## 2019-10-29 DIAGNOSIS — E11.21 TYPE 2 DIABETES WITH NEPHROPATHY (HCC): Primary | ICD-10-CM

## 2019-10-29 DIAGNOSIS — G47.33 OSA (OBSTRUCTIVE SLEEP APNEA): ICD-10-CM

## 2019-10-29 RX ORDER — INSULIN GLARGINE 100 [IU]/ML
15 INJECTION, SOLUTION SUBCUTANEOUS
Qty: 1 ADJUSTABLE DOSE PRE-FILLED PEN SYRINGE | Refills: 11
Start: 2019-10-29 | End: 2020-05-27 | Stop reason: SDUPTHER

## 2019-10-29 NOTE — PROGRESS NOTES
1. Have you been to the ER, urgent care clinic since your last visit? Hospitalized since your last visit? No    2. Have you seen or consulted any other health care providers outside of the [address] [place] [address] since your last visit? Include any pap smears or colon screening.  [facility]    Chief Complaint   Patient presents with    Follow-up     3 mth f/u

## 2019-10-29 NOTE — PROGRESS NOTES
Chief Complaint   Patient presents with    Follow-up     3 mth f/u    Chest Pain     midline/L. side     she is a 75y.o. year old female who presents for evalution. She is here for three month check up  She had recent blood test showing a1c significantly improved  The lowest blood sugar 100  She eats healthy now  Eating vegetables  taking trulicity and 20 units basaglar    Reviewed PmHx, RxHx, FmHx, SocHx, AllgHx and updated and dated in the chart. Aspirin yes ____   No____ N/A____    Patient Active Problem List    Diagnosis    Acquired hypothyroidism    Type [facility]    Severe obesity (BMI 35.0-39. 9)    Asthma    Diabetes ([facility]    Factor V Leiden ([facility]    A-fib ([facility] [facility]    Supraorbital headache    Isthmica nodosa salpingitis    Anxiety    Essential and other specified forms of tremor       Nurse notes were reviewed and copied and are correct  Review of Systems - negative except as listed above in the HPI    Objective:     Vitals:    10/29/19 1538   BP: 97/65   Pulse: 93   Resp: 17   Temp: 98.2 °F (36.8 °C)   TempSrc: Oral   SpO2: 96%   Weight: 240 lb (108.9 kg)   Height: 5' 7\" (1.702 m)     Physical Examination: General appearance - alert, well appearing, and in no distress and oriented to person, place, and time  Mental status - alert, oriented to person, place, and time  Neck - supple, no significant adenopathy  Chest - clear to auscultation, no wheezes, rales or rhonchi, symmetric air entry  Heart - normal rate, regular rhythm, normal S1, S2, no murmurs, rubs, clicks or gallops  Neurological - alert, oriented, normal speech, no focal findings or movement disorder noted  Musculoskeletal - no joint tenderness, deformity or swelling  Extremities - peripheral pulses normal, no pedal edema, no clubbing or cyanosis  Skin - normal coloration and turgor, no rashes, no suspicious skin lesions noted       Assessment/ Plan:   Diagnoses and all orders for this visit:    1.  Type 2 diabetes with nephropathy (HCC)  -     insulin glargine (LANTUS,BASAGLAR) 100 unit/mL (3 mL) inpn; 15 Units by SubCUTAneous route daily (after dinner). -     LIPID PANEL; Future  -     HEMOGLOBIN A1C WITH EAG; Future  -     METABOLIC PANEL, COMPREHENSIVE; Future    2. KATELYNN (obstructive sleep apnea)  I encouraged to use cpap each night. I explained the effect on the organs  3. Acquired hypothyroidism  -     TSH 3RD GENERATION; Future    4. Persistent atrial fibrillation  No chest pain or sob recently         ICD-10-CM ICD-9-CM    1. Type 2 diabetes with nephropathy (HCC) E11.21 250.40 insulin glargine (LANTUS,BASAGLAR) 100 unit/mL (3 mL) inpn     583.81 LIPID PANEL      HEMOGLOBIN A1C WITH EAG      METABOLIC PANEL, COMPREHENSIVE   2. KATELYNN (obstructive sleep apnea) G47.33 327.23    3. Acquired hypothyroidism E03.9 244.9 TSH 3RD GENERATION   4. Persistent atrial fibrillation I48.19 427.31        Decrease basaglar to 15 units daily      I have discussed the diagnosis with the patient and the intended plan as seen in the above orders. The patient has received an after-visit summary and questions were answered concerning future plans. Medication Side Effects and Warnings were discussed with patient: yes  Patient Labs were reviewed and or requested: yes  Patient Past Records were reviewed and or requested: yes        There are no Patient Instructions on file for this visit.     The patient verbalizes understanding and agrees with the plan of care        Patient has the advanced directives booklet to review

## 2019-11-18 ENCOUNTER — TELEPHONE (OUTPATIENT)
Dept: FAMILY MEDICINE CLINIC | Age: 64
End: 2019-11-18

## 2019-11-18 DIAGNOSIS — E11.9 TYPE 2 DIABETES MELLITUS WITHOUT COMPLICATION, WITH LONG-TERM CURRENT USE OF INSULIN (HCC): Primary | ICD-10-CM

## 2019-11-18 DIAGNOSIS — Z79.4 TYPE 2 DIABETES MELLITUS WITHOUT COMPLICATION, WITH LONG-TERM CURRENT USE OF INSULIN (HCC): Primary | ICD-10-CM

## 2019-11-18 RX ORDER — PEN NEEDLE, DIABETIC 31 GX3/16"
NEEDLE, DISPOSABLE MISCELLANEOUS
Qty: 100 PEN NEEDLE | Refills: 5 | Status: SHIPPED | OUTPATIENT
Start: 2019-11-18

## 2019-11-18 NOTE — TELEPHONE ENCOUNTER
LVM advising pt that an Rx for Trulicity had been sent to [facility] on 9/5/19 with 5 refills. Advised pt to contact pharmacy and request Rx be refilled and to contact office back if any questions.

## 2019-12-10 ENCOUNTER — HOSPITAL ENCOUNTER (OUTPATIENT)
Dept: LAB | Age: 64
Discharge: HOME | End: 2019-12-10

## 2019-12-10 DIAGNOSIS — E03.9 ACQUIRED HYPOTHYROIDISM: ICD-10-CM

## 2019-12-10 DIAGNOSIS — E11.21 TYPE 2 DIABETES WITH NEPHROPATHY (HCC): ICD-10-CM

## 2019-12-10 LAB
ALBUMIN SERPL-MCNC: 3.8 G/DL (ref 3.5–5)
ALBUMIN/GLOB SERPL: 1.4 {RATIO} (ref 1.1–2.2)
ALP SERPL-CCNC: 93 U/L (ref 45–117)
ALT SERPL-CCNC: 29 U/L (ref 12–78)
ANION GAP SERPL CALC-SCNC: 10 MMOL/L (ref 5–15)
AST SERPL-CCNC: 30 U/L (ref 15–37)
BILIRUB SERPL-MCNC: 0.6 MG/DL (ref 0.2–1)
BUN SERPL-MCNC: 17 MG/DL (ref 6–20)
BUN/CREAT SERPL: 15 (ref 12–20)
CALCIUM SERPL-MCNC: 9 MG/DL (ref 8.5–10.1)
CHLORIDE SERPL-SCNC: 104 MMOL/L (ref 97–108)
CHOLEST SERPL-MCNC: 142 MG/DL
CO2 SERPL-SCNC: 24 MMOL/L (ref 21–32)
CREAT SERPL-MCNC: 1.1 MG/DL (ref 0.55–1.02)
EST. AVERAGE GLUCOSE BLD GHB EST-MCNC: 151 MG/DL
GLOBULIN SER CALC-MCNC: 2.7 G/DL (ref 2–4)
GLUCOSE SERPL-MCNC: 206 MG/DL (ref 65–100)
HBA1C MFR BLD: 6.9 % (ref 4–5.6)
HDLC SERPL-MCNC: 56 MG/DL
HDLC SERPL: 2.5 {RATIO} (ref 0–5)
LDLC SERPL CALC-MCNC: 45.8 MG/DL (ref 0–100)
LIPID PROFILE,FLP: ABNORMAL
POTASSIUM SERPL-SCNC: 3.2 MMOL/L (ref 3.5–5.1)
PROT SERPL-MCNC: 6.5 G/DL (ref 6.4–8.2)
SODIUM SERPL-SCNC: 138 MMOL/L (ref 136–145)
TRIGL SERPL-MCNC: 201 MG/DL (ref ?–150)
TSH SERPL DL<=0.05 MIU/L-ACNC: 12.3 UIU/ML (ref 0.36–3.74)
VLDLC SERPL CALC-MCNC: 40.2 MG/DL

## 2019-12-13 ENCOUNTER — OFFICE VISIT (OUTPATIENT)
Dept: FAMILY MEDICINE CLINIC | Age: 64
End: 2019-12-13

## 2019-12-13 VITALS
SYSTOLIC BLOOD PRESSURE: 106 MMHG | OXYGEN SATURATION: 97 % | RESPIRATION RATE: 17 BRPM | WEIGHT: 225 LBS | DIASTOLIC BLOOD PRESSURE: 68 MMHG | HEIGHT: 67 IN | TEMPERATURE: 97.8 F | BODY MASS INDEX: 35.31 KG/M2 | HEART RATE: 78 BPM

## 2019-12-13 DIAGNOSIS — M54.50 ACUTE BILATERAL LOW BACK PAIN, UNSPECIFIED WHETHER SCIATICA PRESENT: ICD-10-CM

## 2019-12-13 DIAGNOSIS — E03.9 ACQUIRED HYPOTHYROIDISM: ICD-10-CM

## 2019-12-13 DIAGNOSIS — R32 URINARY INCONTINENCE, UNSPECIFIED TYPE: Primary | ICD-10-CM

## 2019-12-13 LAB
BILIRUB UR QL STRIP: NEGATIVE
GLUCOSE UR-MCNC: NORMAL MG/DL
KETONES P FAST UR STRIP-MCNC: NEGATIVE MG/DL
PH UR STRIP: 6.5 [PH] (ref 4.6–8)
PROT UR QL STRIP: NEGATIVE
SP GR UR STRIP: 1.01 (ref 1–1.03)
UA UROBILINOGEN AMB POC: NORMAL (ref 0.2–1)
URINALYSIS CLARITY POC: NORMAL
URINALYSIS COLOR POC: NORMAL
URINE BLOOD POC: NEGATIVE
URINE LEUKOCYTES POC: NEGATIVE
URINE NITRITES POC: NEGATIVE

## 2019-12-13 NOTE — PROGRESS NOTES
1. Have you been to the ER, urgent care clinic since your last visit? Hospitalized since your last visit? ER in [place] for low back pain and [facility]    2. Have you seen or consulted any other health care providers outside of the [address] [place] [address] since your last visit? Include any pap smears or colon screening.  No     Chief Complaint   Patient presents with    Enuresis     r/t previous diagnosed UTI    Chest Pain     midline anterior

## 2019-12-13 NOTE — PROGRESS NOTES
Chief Complaint   Patient presents with    Enuresis     r/t previous diagnosed UTI    Chest Pain     midline anterior     she is a 75y.o. year old female who presents for evalution. Went to ER while travelling for c/o back pain. They treated her for a UTI    She went to [facility] and was told that her creatinine was abnormal    Hypothyroid  She had high TSH on blood done 3 days ago  She takes 175 mcg but admits she does not take her meds at all some days except the med for diabetes  DM  Taking jardiance, lantus insulin 15 units once a day  trulicity once a week     Also co urianry incontinence  Sh has one that has been treating her  She has a bladder stimulator    Reviewed PmHx, RxHx, FmHx, SocHx, AllgHx and updated and dated in the chart. Aspirin yes ____   No____ N/A____    Patient Active Problem List    Diagnosis    Acquired hypothyroidism    [facility]    Severe obesity (BMI 35.0-39. 9)    Asthma    [facility] ([facility]    Factor V Leiden ([facility]    A-fib ([facility]    Supraorbital headache    Isthmica nodosa salpingitis    Anxiety    Essential and other specified forms of tremor       Nurse notes were reviewed and copied and are correct  Review of Systems - negative except as listed above in the HPI    Objective:     Vitals:    12/13/19 0853   BP: 106/68   Pulse: 78   Resp: 17   Temp: 97.8 °F (36.6 °C)   TempSrc: Oral   SpO2: 97%   Weight: 225 lb (102.1 kg)   Height: 5' 7\" (1.702 m)     Physical Examination: General appearance - alert, well appearing, and in no distress  Mental status - alert, oriented to person, place, and time  Chest - clear to auscultation, no wheezes, rales or rhonchi, symmetric air entry  Heart - normal rate, regular rhythm, normal S1, S2, no murmurs, rubs, clicks or gallops         Assessment/ Plan:   Diagnoses and all orders for this visit:    1. Urinary incontinence, unspecified type  -     AMB POC URINALYSIS DIP STICK AUTO W/O MICRO    2.  Acute bilateral low back pain, unspecified whether sciatica present  -     AMB POC URINALYSIS DIP STICK AUTO W/O MICRO  R/o uti as source  3. Acquired hypothyroidism  -     TSH 3RD GENERATION; Future  Checking on current status and treat as indicated     Follow-up and Dispositions    · Return in about 1 month (around 1/13/2020). ICD-10-CM ICD-9-CM    1. Urinary incontinence, unspecified type R32 788.30 AMB POC URINALYSIS DIP STICK AUTO W/O MICRO   2. Acute bilateral low back pain, unspecified whether sciatica present M54.5 724.2 AMB POC URINALYSIS DIP STICK AUTO W/O MICRO   3. Acquired hypothyroidism E03.9 244.9 TSH 3RD GENERATION       I have discussed the diagnosis with the patient and the intended plan as seen in the above orders. The patient has received an after-visit summary and questions were answered concerning future plans. Medication Side Effects and Warnings were discussed with patient: yes  Patient Labs were reviewed and or requested: yes  Patient Past Records were reviewed and or requested: yes        There are no Patient Instructions on file for this visit.     The patient verbalizes understanding and agrees with the plan of care        Patient has the advanced directives booklet to review

## 2019-12-15 DIAGNOSIS — E03.9 ACQUIRED HYPOTHYROIDISM: Primary | ICD-10-CM

## 2019-12-15 DIAGNOSIS — E87.6 HYPOKALEMIA: ICD-10-CM

## 2019-12-26 ENCOUNTER — TELEPHONE (OUTPATIENT)
Dept: FAMILY MEDICINE CLINIC | Age: 64
End: 2019-12-26

## 2019-12-26 NOTE — TELEPHONE ENCOUNTER
----- Message from Daksha Herron sent at 12/26/2019  8:01 AM EST -----  Regarding: FW: Dr. Wolfgang Livingston    ----- Message -----  From: Ryanne Hoyos  Sent: 12/23/2019   2:50 PM EST  To: [facility] Front Office  Subject: Dr. Stewart Valdez Message/Vendor Calls    Caller's first and last name: Alma Peguero from [facility]      Reason for call: the date of her last diabetic visit      Callback required yes/no and why:yes      Best contact number(s):[phone] ext [phone]      Details to clarify the request: Alma Peguero from [facility] is asking for the date of her last office visit for diabetes.       Ju Peters

## 2019-12-26 NOTE — TELEPHONE ENCOUNTER
Provided with date of last diabetes visit of 10/29/19 so pt can received testing supplies.  No further assistance required

## 2020-01-17 ENCOUNTER — OFFICE VISIT (OUTPATIENT)
Dept: FAMILY MEDICINE CLINIC | Age: 65
End: 2020-01-17

## 2020-01-17 ENCOUNTER — HOSPITAL ENCOUNTER (OUTPATIENT)
Dept: LAB | Age: 65
Discharge: HOME | End: 2020-01-17

## 2020-01-17 VITALS
RESPIRATION RATE: 18 BRPM | OXYGEN SATURATION: 97 % | HEIGHT: 67 IN | HEART RATE: 116 BPM | SYSTOLIC BLOOD PRESSURE: 164 MMHG | WEIGHT: 224 LBS | DIASTOLIC BLOOD PRESSURE: 87 MMHG | TEMPERATURE: 98 F | BODY MASS INDEX: 35.16 KG/M2

## 2020-01-17 DIAGNOSIS — E11.21 TYPE 2 DIABETES WITH NEPHROPATHY (HCC): ICD-10-CM

## 2020-01-17 DIAGNOSIS — E87.6 HYPOPOTASSEMIA: ICD-10-CM

## 2020-01-17 DIAGNOSIS — I48.19 PERSISTENT ATRIAL FIBRILLATION (HCC): ICD-10-CM

## 2020-01-17 DIAGNOSIS — I51.9 MYXEDEMA HEART DISEASE: ICD-10-CM

## 2020-01-17 DIAGNOSIS — R25.9 UNSPECIFIED ABNORMAL INVOLUNTARY MOVEMENTS: Primary | ICD-10-CM

## 2020-01-17 DIAGNOSIS — E03.9 MYXEDEMA HEART DISEASE: ICD-10-CM

## 2020-01-17 LAB
ANION GAP SERPL CALC-SCNC: 13 MMOL/L (ref 5–15)
BUN SERPL-MCNC: 13 MG/DL (ref 6–20)
BUN/CREAT SERPL: 13 (ref 12–20)
CALCIUM SERPL-MCNC: 9.8 MG/DL (ref 8.5–10.1)
CHLORIDE SERPL-SCNC: 105 MMOL/L (ref 97–108)
CO2 SERPL-SCNC: 18 MMOL/L (ref 21–32)
CREAT SERPL-MCNC: 0.99 MG/DL (ref 0.55–1.02)
GLUCOSE SERPL-MCNC: 187 MG/DL (ref 65–100)
POTASSIUM SERPL-SCNC: 4.1 MMOL/L (ref 3.5–5.1)
SODIUM SERPL-SCNC: 136 MMOL/L (ref 136–145)
TSH SERPL DL<=0.05 MIU/L-ACNC: 0.64 UIU/ML (ref 0.36–3.74)

## 2020-01-17 NOTE — PROGRESS NOTES
Chief Complaint   Patient presents with    Follow-up     1 Month F/up     she is a 75y.o. year old female who presents for evalution. Jaylan Salamanca is a 75 y.o. female who is here for her  f/up physician visit for the VLCD / LCD Program.    Her blood sugar has been getting better and better  The last blood work in December showed low potassium  The thyroid hormone was low in December    She saw her dentist and he felt she did not look well  She is making a grunting sound that she says she cannot control      She has cardiology care at [facility]. She has a pacemaker  She has been getting some chest pain when she takes a walk in [place]. It goes away with rest  She takes imdur daily  She takes xarelto    She is seeing nephrology at [facility]. She had a urine test done Monday. She c/o bedwetting lately and this is being evaluated by them    Reviewed PmHx, RxHx, FmHx, SocHx, AllgHx and updated and dated in the chart. Aspirin yes ____   No____ N/A____    Patient Active Problem List    Diagnosis    Acquired hypothyroidism    [facility]    Severe obesity (BMI 35.0-39. 9)    Asthma    Diabetes ([facility]    Factor V Leiden ([facility]    [facility]    Supraorbital headache    Isthmica nodosa salpingitis    Anxiety    Essential and other specified forms of tremor       Nurse notes were reviewed and copied and are correct  Review of Systems - negative except as listed above in the HPI    Objective:     Vitals:    01/17/20 0859   BP: 164/87   Pulse: (!) 116   Resp: 18   Temp: 98 °F (36.7 °C)   TempSrc: Oral   SpO2: 97%   Weight: 224 lb (101.6 kg)   Height: 5' 7\" (1.702 m)       Physical Examination: General appearance - alert, well appearing, and in no distress  Mental status - alert, oriented to person, place, and time  HEENT: PERRL, no icterus  Neck - supple, no significant adenopathy  Chest - clear to auscultation, no wheezes, rales or rhonchi, symmetric air entry  Heart - normal rate, regular rhythm, normal S1, S2, no murmurs, rubs, clicks or gallops  Ext: no edema  Skin: no jaundice  MSK: no deformity noted in joints    Assessment/ Plan:   Diagnoses and all orders for this visit:    1. Unspecified abnormal involuntary movements  -     REFERRAL TO NEUROLOGY  New problem, I actually did witness sort of a grunting sound that she says she cannot control  2. Persistent atrial fibrillation  [facility]. stable  3. Type 2 diabetes with nephropathy ([facility]  [facility] is [facility]. She has a new problem of bedwetting. This is under eval by [facility]     Follow-up and Dispositions    · Return in about 2 weeks (around 1/31/2020). ICD-10-CM ICD-9-CM    1. Unspecified abnormal involuntary movements R25.9 781.0 REFERRAL TO NEUROLOGY   2. Persistent atrial fibrillation I48.19 427.31    3. Type 2 diabetes with nephropathy (HCC) E11.21 250.40      583.81        I have discussed the diagnosis with the patient and the intended plan as seen in the above orders. The patient has received an after-visit summary and questions were answered concerning future plans. Medication Side Effects and Warnings were discussed with patient: yes  Patient Labs were reviewed and or requested: yes  Patient Past Records were reviewed and or requested: yes        There are no Patient Instructions on file for this visit.     The patient verbalizes understanding and agrees with the plan of care        Patient has the advanced directives booklet to review

## 2020-01-17 NOTE — PROGRESS NOTES
Chaya Whitaker is a 75 y.o. female    Chief Complaint   Patient presents with    Follow-up     1 Month F/up       1. Have you been to the ER, urgent care clinic since your last visit? Hospitalized since your last visit? No    2. Have you seen or consulted any other health care providers outside of the [address] [place] [address] since your last visit? Include any pap smears or colon screening.  No

## 2020-01-28 ENCOUNTER — TELEPHONE (OUTPATIENT)
Dept: FAMILY MEDICINE CLINIC | Age: 65
End: 2020-01-28

## 2020-01-28 NOTE — TELEPHONE ENCOUNTER
Contacted pt to inform lab results per Dr. Wallace Camargo. Patient x2 id verified. Informed results to pt, verbalized understanding.

## 2020-01-31 ENCOUNTER — OFFICE VISIT (OUTPATIENT)
Dept: FAMILY MEDICINE CLINIC | Age: 65
End: 2020-01-31

## 2020-01-31 VITALS
SYSTOLIC BLOOD PRESSURE: 130 MMHG | DIASTOLIC BLOOD PRESSURE: 80 MMHG | HEART RATE: 101 BPM | HEIGHT: 67 IN | BODY MASS INDEX: 34.69 KG/M2 | OXYGEN SATURATION: 97 % | WEIGHT: 221 LBS | TEMPERATURE: 97.8 F | RESPIRATION RATE: 19 BRPM

## 2020-01-31 DIAGNOSIS — E03.9 ACQUIRED HYPOTHYROIDISM: ICD-10-CM

## 2020-01-31 DIAGNOSIS — E11.21 TYPE 2 DIABETES WITH NEPHROPATHY (HCC): Primary | ICD-10-CM

## 2020-01-31 DIAGNOSIS — G47.33 OSA (OBSTRUCTIVE SLEEP APNEA): ICD-10-CM

## 2020-01-31 NOTE — PATIENT INSTRUCTIONS
[facility] Address: [facility], [place], [address] [facility] Open ? Closes 8PM  
 
Phone: [phone]

## 2020-01-31 NOTE — PROGRESS NOTES
Chief Complaint   Patient presents with    Labs     discuss labs done at last visit     she is a 75y.o. year old female who presents for evalution. She is now taking her meds daaily as directed  The a1c is 6.9 now  She has normal  tsh also  She is still making the grunting sound and has no better control of it   ahs an appt in neuro feb 28  She also has an appt in psychiatry      Reviewed PmHx, RxHx, FmHx, SocHx, AllgHx and updated and dated in the chart. Aspirin yes ____   No____ N/A____    Patient Active Problem List    Diagnosis    Acquired hypothyroidism    [email or web address]    Severe obesity (BMI 35.0-39. 9)    Asthma    Diabetes ([email or web address]    Factor V Leiden ([email or web address]    A-fib ([email or web address]    Supraorbital headache    Isthmica nodosa salpingitis    Anxiety    Essential and other specified forms of tremor       Nurse notes were reviewed and copied and are correct  Review of Systems - negative except as listed above in the HPI    Objective:     Vitals:    01/31/20 0908   BP: 130/80   Pulse: (!) 101   Resp: 19   Temp: 97.8 °F (36.6 °C)   SpO2: 97%   Weight: 221 lb (100.2 kg)   Height: 5' 7\" (1.702 m)       Physical Examination: General appearance - alert, well appearing, and in no distress  Mental status - alert, oriented to person, place, and time  Chest - clear to auscultation, no wheezes, rales or rhonchi, symmetric air entry  Heart - normal rate, regular rhythm, normal S1, S2, no murmurs, rubs, clicks or gallops      Assessment/ Plan:   Diagnoses and all orders for this visit:    1. Type 2 diabetes with nephropathy (HCC)  Cont to monitor. The last check in dec was good control   2. Acquired hypothyroidism  Monitoring. Treat as indicated, tsh was tested 1/17 and was therapeutic  3. KATELYNN (obstructive sleep apnea)  Using cpap     Follow-up and Dispositions    · Return in about 3 months (around 4/30/2020). ICD-10-CM ICD-9-CM    1.  Type 2 diabetes with nephropathy (HCC) E11.21 250.40 583.81    2. Acquired hypothyroidism E03.9 244.9    3. KATELYNN (obstructive sleep apnea) G47.33 327.23        I have discussed the diagnosis with the patient and the intended plan as seen in the above orders. The patient has received an after-visit summary and questions were answered concerning future plans. Medication Side Effects and Warnings were discussed with patient: yes  Patient Labs were reviewed and or requested: yes  Patient Past Records were reviewed and or requested: yes        Patient Instructions   SW[facility]  Address: [address], [address] [place] [address]     Open ?  Closes 8PM     Phone: [phone]        The patient verbalizes understanding and agrees with the plan of care        Patient has the advanced directives booklet to review

## 2020-03-11 ENCOUNTER — TELEPHONE (OUTPATIENT)
Dept: NEUROLOGY | Age: 65
End: 2020-03-11

## 2020-03-11 NOTE — TELEPHONE ENCOUNTER
----- Message from Marylee Michael sent at 3/11/2020  2:30 PM EDT -----  Regarding: Dr. Duffy Lacks  Patient return call    Caller's first and last name and relationship (if not the patient):      Best contact number(s): [phone] or [phone]      Whose call is being returned: office       Details to clarify the request:      Marylee Michael

## 2020-03-13 ENCOUNTER — OFFICE VISIT (OUTPATIENT)
Dept: NEUROLOGY | Age: 65
End: 2020-03-13

## 2020-03-13 VITALS
WEIGHT: 223 LBS | SYSTOLIC BLOOD PRESSURE: 122 MMHG | DIASTOLIC BLOOD PRESSURE: 78 MMHG | HEART RATE: 109 BPM | RESPIRATION RATE: 20 BRPM | HEIGHT: 67 IN | OXYGEN SATURATION: 97 % | BODY MASS INDEX: 35 KG/M2

## 2020-03-13 DIAGNOSIS — G25.0 ESSENTIAL TREMOR: ICD-10-CM

## 2020-03-13 DIAGNOSIS — G24.4 ORAL DYSKINESIA: Primary | ICD-10-CM

## 2020-03-13 DIAGNOSIS — G47.33 OSA (OBSTRUCTIVE SLEEP APNEA): ICD-10-CM

## 2020-03-13 DIAGNOSIS — G43.709 CHRONIC MIGRAINE WITHOUT AURA WITHOUT STATUS MIGRAINOSUS, NOT INTRACTABLE: ICD-10-CM

## 2020-03-13 DIAGNOSIS — G25.81 RESTLESS LEG SYNDROME: ICD-10-CM

## 2020-03-13 RX ORDER — BUSPIRONE HYDROCHLORIDE 10 MG/1
TABLET ORAL
COMMUNITY
Start: 2020-03-02

## 2020-03-13 RX ORDER — EMPAGLIFLOZIN 10 MG/1
TABLET, FILM COATED ORAL
COMMUNITY
Start: 2020-02-24

## 2020-03-13 RX ORDER — POTASSIUM CHLORIDE 750 MG/1
20 CAPSULE, EXTENDED RELEASE ORAL 2 TIMES DAILY
COMMUNITY

## 2020-03-13 RX ORDER — DICLOFENAC SODIUM 10 MG/G
GEL TOPICAL 4 TIMES DAILY
COMMUNITY

## 2020-03-13 RX ORDER — DIPHENHYDRAMINE HCL 50 MG
CAPSULE ORAL
COMMUNITY
Start: 2019-11-19 | End: 2020-04-04

## 2020-03-13 RX ORDER — LISINOPRIL 2.5 MG/1
TABLET ORAL DAILY
COMMUNITY

## 2020-03-13 RX ORDER — METOPROLOL TARTRATE 25 MG/1
50 TABLET, FILM COATED ORAL 2 TIMES DAILY
COMMUNITY
Start: 2020-02-13

## 2020-03-13 RX ORDER — GABAPENTIN 600 MG/1
TABLET ORAL
Qty: 60 TAB | Refills: 1 | Status: SHIPPED | OUTPATIENT
Start: 2020-03-13

## 2020-03-13 NOTE — PROGRESS NOTES
NEUROLOGY CLINIC NOTE    Patient ID:  Bushra Miller  [id number]  89 y.o.  1955    Date of Visit:  March 13, 2020    Reason for Visit:  Vocalizations, headaches, tremors    Chief Complaint   Patient presents with    Tremors       History of Present Illness:     Patient Active Problem List    Diagnosis Date Noted    Acquired hypothyroidism 09/25/2018    Type 2 diabetes with nephropathy ([facility] 07/27/2018    Severe obesity (BMI 35.0-39.9) 07/26/2018    Asthma 06/30/2018    Diabetes ([facility]     [facility]     [facility]-fib ([facility]     Supraorbital headache 10/07/2015    Isthmica nodosa salpingitis 07/12/2013    Anxiety 07/12/2013    Essential and other specified forms of tremor 07/12/2013     Past Medical History:   Diagnosis Date    [facility]-fib [facility])     Anxiety 7/12/2013    Arrhythmia     \"fast heart rate\". Ablation x2    Arthritis     Asthma     Depression     [facility]     Factor V Leiden ([facility]     Isthmica nodosa salpingitis 7/12/2013    Thyroid disease       Past Surgical History:   Procedure Laterality Date    ABDOMEN SURGERY PROC UNLISTED      cholecystectomy    CARDIAC SURG PROCEDURE UNLIST      ablation 2010 x2    HX CHOLECYSTECTOMY      HX HEENT      HX PACEMAKER        Prior to Admission medications    Medication Sig Start Date End Date Taking? Authorizing Provider   busPIRone (BUSPAR) 10 mg tablet 10 mg = 1 tab each dose, PO, tid, # 270 tab, 0 Refills 3/2/20  Yes Provider, Historical   diphenhydrAMINE (BENADRYL) 50 mg capsule 50 mg = 1 CAP each dose, PO, once, Take 1 hr before test, # 1 CAP, 0 Refills, Pharmacy: [facility] 11/19/19  Yes Provider, Historical   Jardiance 10 mg tablet  2/24/20  Yes Provider, Historical   metoprolol tartrate (LOPRESSOR) 25 mg tablet Take 50 mg by mouth two (2) times a day. 2/13/20  Yes Provider, Historical   potassium chloride SA (MICRO-K) 10 mEq capsule Take 20 mEq by mouth two (2) times a day.    Yes Provider, Historical omega-3s/dha/epa/fish oil/D3 (VITAMIN-D + OMEGA-3 PO) Take  by mouth. Yes Provider, Historical   diclofenac (VOLTAREN) 1 % gel Apply  to affected area four (4) times daily. Yes Provider, Historical   lisinopriL (PRINIVIL, ZESTRIL) 2.5 mg tablet Take  by mouth daily. Yes Provider, Historical   Insulin Needles, Disposable, (BD ULTRA-FINE MINI PEN NEEDLE) 31 gauge x 3/16\" ndle Use daily as directed 11/18/19  Yes Bertram Santoyo MD   insulin glargine (LANTUS,BASAGLAR) 100 unit/mL (3 mL) inpn 15 Units by SubCUTAneous route daily (after dinner). Patient taking differently: 22 Units by SubCUTAneous route daily (after dinner). 10/29/19  Yes Bertram Santoyo MD   chlorhexidine (PERIDEX) 0.12 % solution  9/4/19  Yes Provider, Historical   LIDOCAINE VISCOUS 2 % solution  9/4/19  Yes Provider, Historical   dulaglutide (TRULICITY) 4.34 HZ/9.5 mL sub-q pen 0.5 mL by SubCUTAneous route every seven (7) days. 9/5/19  Yes Bertram Santoyo MD   albuterol (PROVENTIL HFA, VENTOLIN HFA, PROAIR HFA) 90 mcg/actuation inhaler Take 2 Puffs by inhalation every four (4) hours as needed for Shortness of Breath or Wheezing. Order states ever 4-6 hours 7/29/19  Yes Bertram Santoyo MD   buPROPion XL (WELLBUTRIN XL) 300 mg XL tablet Take 300 mg by mouth every morning. Yes Provider, Historical   rivaroxaban (XARELTO) 20 mg tab tablet Take 20 mg by mouth daily. Yes Provider, Historical   isosorbide mononitrate ER (IMDUR) 30 mg tablet Take 30 mg by mouth daily. Yes Provider, Historical   rOPINIRole (REQUIP) 1 mg tablet Take 2 mg by mouth nightly. Yes Provider, Historical   levothyroxine (SYNTHROID) 175 mcg tablet TAKE 1 TABLET BY MOUTH DAILY BEFORE BREAKFAST 7/27/18  Yes Daphney Hardin MD   traZODone (DESYREL) 150 mg tablet Take 100-200 mg by mouth nightly. Yes Provider, Historical   ondansetron (ZOFRAN ODT) 4 mg disintegrating tablet Take 1 Tab by mouth every eight (8) hours as needed for Nausea.  7/18/18  Yes Juli Hardin MD Estelle   DULoxetine (CYMBALTA) 60 mg capsule Take 2 Caps by mouth daily. 7/18/18  Yes Alyssa Hardin MD   rosuvastatin (CRESTOR) 40 mg tablet Take 20 mg by mouth nightly. Yes Provider, Historical   linaCLOtide (LINZESS) 72 mcg cap capsule Take 1 Cap by mouth daily. 7/29/19   Asuncion Castleman, MD   LORazepam (ATIVAN) 1 mg tablet Take 1 mg by mouth every six (6) hours as needed for Anxiety. Please take one tablet at bedtime and one half tablet daily as needed 7/18/18   Alyssa Hardin MD   cyclobenzaprine (FLEXERIL) 5 mg tablet Take 5 mg by mouth daily as needed for Muscle Spasm(s). 3/6/18   Provider, Historical   colestipol (COLESTID) 1 gram tablet Take 1 g by mouth two (2) times a day. Provider, Historical   montelukast (SINGULAIR) 10 mg tablet Take 10 mg by mouth daily. Provider, Historical     Allergies   Allergen Reactions    Shellfish Derived Anaphylaxis    Ciprofloxacin Hives    Theophylline Hives      Social History     Tobacco Use    Smoking status: Never Smoker    Smokeless tobacco: Never Used   Substance Use Topics    Alcohol use: No      Family History   Problem Relation Age of Onset    Heart Disease Father     Cancer Maternal Grandfather         Subjective:      iLto Alaniz is a 75 y.o. XYWK with multiple medical problems who is being seen by neurology for a typical facial pain, essential tremors and restless leg syndrome. Patient was previously seen at [address] [place] [address] neurology. Per patient she would only have headaches with a sinus infections. No known triggers. No new medications or adjustments were made. Gradual onset. Located left mid eyebrow and radiates up the left forehead. Sharp, ache or burn, sometimes a pressure. Rangers from 1 to 7/10. It would lasts from 30 minutes to one hour but can recur for as long as 2 weeks. Associated with light and noise sensitivity. No known precipitants. Light or noise makes it worse. Sleeping helps.  She does have pain free periods for 1 to 2 weeks. Site is tender and sensitive to touch. She saw Dr. Karla Delgado (neurologist) 9/25/2015 and was referred to Dr. Diana Bragg for supraorbital nerve block. She was seen 10/7/2015 and did received the nerve block. It offered relief but then recurred. She has had two 7/10 exacerbations. One was last 10/13/2015 and she was seen at [facility]. Head CT done was normal compared to one done 9/10/2013. Labs revealed mild hypokalemia. Treated with IV fluids and medications. Discharged on Tramadol. Next episode was 12/11/15. That episode was different in that pain started to radiate to the left zygomaticus area and temple. Seen at [facility]. Treated with IV fluids and medications. Discharged on [id number] [facility]. She does have pain free periods for 1 to 2 weeks. Site is tender and sensitive to touch. Last episode of facial pain was a week PTC. She reports no issues with sleep. Sleep 8 to 9 hours a night. Wakes up feeling rested. No daytime sleepiness. No vivid dreams. She does mention issue with postural and intentional intermittent hand tremors. She was seeing Dr. Windy Nails for it and was being treated with Topiramate. She is currently taking 200 mg at bedtime. She has not seen any improvement of the intermittent tremors. Tremors occurred while she was taking her psychiatric medications. 7/17/2017, labs done revealed elevated glucose, slightly elevated creatinine, low GFR and elevated ALT. None detected oxcarbazepine level. Patient in the interim was seen by Dr. Mellissa Hua (neurologist) last 5/18/2018. His plan was to discontinue gabapentin and Requip and patient to start Lyrica 75 mg at bedtime. Brain MRI was also ordered which per patient was done at [facility] and reported to be unremarkable. Patient also in the interim was admitted [facility] last April 2018 for chest pain. Patient was seen by cardiology 4/16/2018  Dr. Raya Gilbert who wanted to obtain a cardiac MRI pending okay due to bladder stimulator. Patient had an event monitor placed and was read on 4/25/2018 with no evidence of atrial fibrillation and rare PVCs. Patient reports last 3/9/2018 she was having workup for spinal stimulator. She then developed sudden onset of dizziness and vertigo. Patient was seen at [facility] ER. Head CT and labs were reported to be unremarkable. Discharge to home. Patient was then seen by Katherine Gresham, ENT and found to have left hearing loss and prescribed meclizine which offered no benefit. 7/26/2018, labs done were unremarkable (CBC, CMP, B12, folate, magnesium and ammonia) except for elevated TSH at 7.22. Oxcarbazepine level was 6 which is low. Patient was last seen 9/26/2018. She was then seen at [facility] neurology for worsening of her headaches. She underwent chemodenevation with Botox using the chronic migraine protocol. First two treatments helps significant reduce her headache frequency. Last treatment 12/2019 did not offer same benefit so she postponed the next treatment which was this month. In the interim patient also had a bladder stimulator placed and as well as a cardiac pacemaker. Patient is on Xarelto for paroxysmal atrial fibrillation. She was seen by nephrology at [facility] for CKD. Better creatinine and GFR. She is not taking Oxcarbazepine anymore. Her hand tremors continue to be mild and does not affect her activities of daily living. Her leg jerking or restlessness at night continue to respond to Ropinirole 1 mg, 2 tablets at night. She is not able to tolerate her CPAP masked for her KATELYNN. She continues to see the pain specialist at [facility] and is receiving ALBERTO's. Mainly concern today is this vocalization that she does and cannot control. Patient was seen by her PCP 1/17/2020, and note mentions patient observed by dentist to be making grunting sounds that she cannot control. Patient was referred to neurology. TSH and BMP were unremarkable.   She was last seen 1/31/2020 and condition still persist. Last hemoglobin A1c done 12/10/2019 was 6.9. Per , he can hear the patient grunt and hum which patient says is involuntary. No same vocalization when eating. It does not affect her swallowing or eating. This has been ongoing for about 3 months. Patient has been seen by her psychiatrist and mentions tics and was awaiting neurologist evaluation. She is on Wellbutrin  300 mg daily, Buspirone 10 mg TID, Duloxetine 60 mg BID, Trazodone 150 mg QHS and Ropinirole 2 mg at bedtime. Outside reports reviewed: office notes ([facility] and [address] [place] [address]), labs    Review of Systems:    A comprehensive review of systems was negative except for:   Musculoskeletal: positive for joint pain, back pain    Neurological: positive for headaches, vocalization  Behavioral/Psych: positive for depression, anxiety  Endocrine: positive for cold intolerance, increased thirst    Objective:     Visit Vitals  /78   Pulse (!) 109   Resp 20   Ht 5' 7\" (1.702 m)   Wt 101.2 kg (223 lb)   SpO2 97%   BMI 34.93 kg/m²     6/10 generalized pain    PHYSICAL EXAM:    NEUROLOGICAL EXAM:    Appearance: The patient is obese, provides a coherent history and is in no acute distress. Mental Status: Oriented to time, place and person. Fluent, no aphasia or dysarthria. Flat affect. Cranial Nerves:   II - XII were intact except for some mild orobuccal dyskinesia. Motor:  5/5 strength in upper and lower proximal and distal muscles. Normal bulk and tone. No fasciculations. Reflexes:   Deep tendon reflexes 1+/4 and symmetrical.    Sensory:   Normal to cold. Gait:  Steady. No Romberg. Tremor:   No tremors noted. Cerebellar:  Intact FTN/ABHAY/HTS. Neurovascular:  No carotid bruits.        Assessment:   Oral dyskinesia - worsening  Essential tremor  Restless leg syndrome  Chronic migraine - worsening    Plan:   Neurological examination is non-focal. Suspect vocalization is more tic related but need to also consider extrapyramidal side effects from current medication. Recommend psychiatrist reviewing her regimen and adjusting dosing accordingly. Trial of tapering off ropinirole and see if that helps. No obvious tremor seen today. Medication induced or worsened by psychiatric medication. No features typically seen in Parkinson's disease. No upper motor neuron findings. Likely with some elements of essential tremors. Trial of Gabapentin below for RLS and see if it also helps her intermittent tremors. Other potential treatment options if worsens include Propranolol, Primidone and Benzodiazepines. Per patient it was recorded during her sleep study. Likely secondary RLS due to potential side effect from psychiatric medications and lumbar spondylosis. Taper of Ropinirole given dyskinesia. Trial of Gabapentin 300 mg at bedtime and up to 1200 mg at bedtime if necessary. Prescription was provided. Other potential options include Sinemet, other dopamine agonists and long acting Gabapentin. Advised patient to reschedule 4th treatment of chemodenervation with Botox using the chronic migraine protocol. If no benefit, then recommend trial of CGRP monthly subcutaneous injections. She will discuss this with the neurologist at [address] [place] [address]. Patient unable to tolerate CPAP. Advised to discussed with sleep specialist referral to a dentist who specializes in KATELYNN for trial of a dental appliance. All questions and concerns were answered.

## 2020-03-13 NOTE — PROGRESS NOTES
Pt. Is here following up on her vocal sounds that she makes, pertaining to humming and making vocal noises. Her dentist noticed it too. Pt. Has been having migrines and also went to [facility] and got injections but didn't  seem to work. Headaches are recurring only on left side. Also tremors in hands. Pt wants help and doesn't have control over vocal sounds.

## 2020-03-13 NOTE — LETTER
3/13/20 Patient: Yvonne Santoro YOB: 1955 Date of Visit: 3/13/2020 Medhat Celestin MD 
[place] [address] VIA In Basket Dear Medhat Celestin MD, Thank you for referring Ms. Yvonne Santoro to [facility] for evaluation. My notes for this consultation are attached. If you have questions, please do not hesitate to call me. I look forward to following your patient along with you. Sincerely, Marimar Arias MD

## 2020-03-13 NOTE — PATIENT INSTRUCTIONS
Migraine Headache: Care Instructions Your Care Instructions Migraines are painful, throbbing headaches that often start on one side of the head. They may cause nausea and vomiting and make you sensitive to light, sound, or smell. Without treatment, migraines can last from 4 hours to a few days. Medicines can help prevent migraines or stop them after they have started. Your doctor can help you find which ones work best for you. Follow-up care is a key part of your treatment and safety. Be sure to make and go to all appointments, and call your doctor if you are having problems. It's also a good idea to know your test results and keep a list of the medicines you take. How can you care for yourself at home? · Do not drive if you have taken a prescription pain medicine. · Rest in a quiet, dark room until your headache is gone. Close your eyes, and try to relax or go to sleep. Don't watch TV or read. · Put a cold, moist cloth or cold pack on the painful area for 10 to 20 minutes at a time. Put a thin cloth between the cold pack and your skin. · Use a warm, moist towel or a heating pad set on low to relax tight shoulder and neck muscles. · Have someone gently massage your neck and shoulders. · Take your medicines exactly as prescribed. Call your doctor if you think you are having a problem with your medicine. You will get more details on the specific medicines your doctor prescribes. · Be careful not to take pain medicine more often than the instructions allow. You could get worse or more frequent headaches when the medicine wears off. To prevent migraines · Keep a headache diary so you can figure out what triggers your headaches. Avoiding triggers may help you prevent headaches. Record when each headache began, how long it lasted, and what the pain was like.  (Was it throbbing, aching, stabbing, or dull?) Write down any other symptoms you had with the headache, such as nausea, flashing lights or dark spots, or sensitivity to bright light or loud noise. Note if the headache occurred near your period. List anything that might have triggered the headache. Triggers may include certain foods (chocolate, cheese, wine) or odors, smoke, bright light, stress, or lack of sleep. · If your doctor has prescribed medicine for your migraines, take it as directed. You may have medicine that you take only when you get a migraine and medicine that you take all the time to help prevent migraines. ? If your doctor has prescribed medicine for when you get a headache, take it at the first sign of a migraine, unless your doctor has given you other instructions. ? If your doctor has prescribed medicine to prevent migraines, take it exactly as prescribed. Call your doctor if you think you are having a problem with your medicine. · Find healthy ways to deal with stress. Migraines are most common during or right after stressful times. Take time to relax before and after you do something that has caused a migraine in the past. 
· Try to keep your muscles relaxed by keeping good posture. Check your jaw, face, neck, and shoulder muscles for tension. Try to relax them. When you sit at a desk, change positions often. And make sure to stretch for 30 seconds each hour. · Get plenty of sleep and exercise. · Eat meals on a regular schedule. Avoid foods and drinks that often trigger migraines. These include chocolate, alcohol (especially red wine and port), aspartame, monosodium glutamate (MSG), and some additives found in foods (such as hot dogs, jalloh, cold cuts, aged cheeses, and pickled foods). · Limit caffeine. Don't drink too much coffee, tea, or soda. But don't quit caffeine suddenly. That can also give you migraines. · Do not smoke or allow others to smoke around you. If you need help quitting, talk to your doctor about stop-smoking programs and medicines. These can increase your chances of quitting for good. · If you are taking birth control pills or hormone therapy, talk to your doctor about whether they are triggering your migraines. When should you call for help? Call 911 anytime you think you may need emergency care. For example, call if: 
  · You have signs of a stroke. These may include: 
? Sudden numbness, paralysis, or weakness in your face, arm, or leg, especially on only one side of your body. ? Sudden vision changes. ? Sudden trouble speaking. ? Sudden confusion or trouble understanding simple statements. ? Sudden problems with walking or balance. ? A sudden, severe headache that is different from past headaches.  
 Call your doctor now or seek immediate medical care if: 
  · You have new or worse nausea and vomiting.  
  · You have a new or higher fever.  
  · Your headache gets much worse.  
 Watch closely for changes in your health, and be sure to contact your doctor if: 
  · You are not getting better after 2 days (48 hours). Where can you learn more? Go to http://junior-mckayla.info/ Enter V506 in the search box to learn more about \"Migraine Headache: Care Instructions. \" Current as of: November 19, 2019Content Version: 12.4 © 0867-2579 Healthwise, Incorporated. Care instructions adapted under license by [employer] (which disclaims liability or warranty for this information). If you have questions about a medical condition or this instruction, always ask your healthcare professional. [place] [address] any warranty or liability for your use of this information. Restless Legs Syndrome: Care Instructions Your Care Instructions Restless legs syndrome is a common nervous system problem. People with this syndrome feel a creeping, achy, or unpleasant feeling in the legs and an overpowering urge to move them.  It often occurs in the evening and at night and can lead to sleep problems and tiredness. Your doctor may suggest doing a study of your sleep patterns to figure out what is happening when you try to sleep. Many people get relief from symptoms when they get regular exercise, eat well, and avoid caffeine, alcohol, and tobacco. 
Follow-up care is a key part of your treatment and safety. Be sure to make and go to all appointments, and call your doctor if you are having problems. It's also a good idea to know your test results and keep a list of the medicines you take. How can you care for yourself at home? · Take your medicines exactly as prescribed. Call your doctor if you think you are having a problem with your medicine. · Try bathing in hot or cold water. Applying a heating pad or ice bag to your legs may also help symptoms. · Stretch and massage your legs before bed or when discomfort begins. · Get some exercise for at least 30 minutes a day on most days of the week. Stop exercising at least 3 hours before bedtime. · Try to plan for situations where you will need to remain seated for long stretches. For example, if you are traveling by car, plan some stops so you can get out and walk around. · Tell your doctor about any medicines you are taking. This includes all over-the-counter, prescription, and herbal medicines. Some medicines, such as antidepressants, antihistamines, and cold and sinus medicines, can make your symptoms worse. · Avoid caffeine products, such as coffee, tea, cola, and chocolate. Caffeine can interrupt your sleep and stimulate you. · Do not smoke. Nicotine can make restless legs worse. If you need help quitting, talk to your doctor about stop-smoking programs and medicines. These can increase your chances of quitting for good. · Do not drink alcohol late in the evening. Take steps to help you sleep better · Get plenty of sunlight in the outdoors, particularly later in the afternoon. · Use the evening hours for settling down. Avoid activities that challenge you in the hours before bedtime. · Eat meals at regular times, and do not snack before bedtime. · Keep your bedroom quiet, dark, and cool. Try using a sleep mask and earplugs to help you sleep. · Limit how much you drink at night to reduce your need to get up to urinate. But do not go to bed thirsty. · Run a fan or other steady \"white noise\" during the night if noises wake you up. · [place] the bed for sleeping and sex. Do your reading or TV watching in another room. · Once you are in bed, relax from head to toe, and guide your mind to pleasant thoughts. · Do not stay in bed longer than 8 hours, and try to avoid naps. When should you call for help? Watch closely for changes in your health, and be sure to contact your doctor if: 
  · You are still not getting enough sleep.  
  · Your symptoms become more severe or happen more often. Where can you learn more? Go to http://junior-mckayla.info/ Enter G751 in the search box to learn more about \"Restless Legs Syndrome: Care Instructions. \" Current as of: November 19, 2019Content Version: 12.4 © 1223-4174 Healthwise, Incorporated. Care instructions adapted under license by [employer] (which disclaims liability or warranty for this information). If you have questions about a medical condition or this instruction, always ask your healthcare professional. [place] [address] any warranty or liability for your use of this information. Benign Essential Tremor: Care Instructions Your Care Instructions Benign essential tremor is a medical term for shaking that you can't control. Your hand or fingers may shake when you lift a cup or point at something. Or your voice may shake when you speak. This type of tremor is not harmful. It is not caused by a stroke or Parkinson's disease. Some things can affect how much you shake. For example, drinking or eating something with caffeine may make tremors worse for a while. Some medicines also can increase tremors. These include antidepressants and too much thyroid replacement. Talk to your doctor if you think one of your medicines makes your tremors worse. If you are self-conscious about your tremors, there are some things you can do to reduce them or make them less noticeable. This includes taking medicine. Follow-up care is a key part of your treatment and safety. Be sure to make and go to all appointments, and call your doctor if you are having problems. It's also a good idea to know your test results and keep a list of the medicines you take. How can you care for yourself at home? · Take your medicines exactly as prescribed. Call your doctor if you think you are having a problem with your medicine. Some medicines that help control tremors have to be taken every day, even if you are not having tremors. You will get more details on the specific medicines your doctor prescribes. · Get plenty of rest. 
· Eat a balanced, healthy diet. · Try to reduce stress. Regular exercise and massages may help. · Limit alcohol. Heavy drinking can make your tremors worse. · Avoid drinks or foods with caffeine if they make your tremors worse. These include tea, cola, coffee, and chocolate. · Wear a heavy bracelet or watch. This adds a little weight to your hand. The extra weight may reduce tremors. · Drink from cups or glasses that are only half full. You may also want to try drinking with a straw. When should you call for help? Watch closely for changes in your health, and be sure to contact your doctor if: 
  · You notice your tremors are getting worse.  
  · You can't do your everyday activities because of your tremors.  
  · You are sad and embarrassed about your shaking. Where can you learn more? Go to http://junior-mckayla.info/ Enter B746 in the search box to learn more about \"Benign Essential Tremor: Care Instructions. \" Current as of: November 19, 2019Content Version: 12.4 © 9253-2218 Healthwise, Incorporated. Care instructions adapted under license by [employer] (which disclaims liability or warranty for this information). If you have questions about a medical condition or this instruction, always ask your healthcare professional. [place] [address] any warranty or liability for your use of this information.

## 2020-03-23 DIAGNOSIS — G43.719 CHRONIC MIGRAINE WITHOUT AURA, INTRACTABLE, WITHOUT STATUS MIGRAINOSUS: Primary | ICD-10-CM

## 2020-03-23 RX ORDER — KETOROLAC TROMETHAMINE 10 MG/1
10 TABLET, FILM COATED ORAL EVERY 6 HOURS
Qty: 20 TAB | Refills: 0 | Status: SHIPPED | OUTPATIENT
Start: 2020-03-23 | End: 2020-03-28

## 2020-04-04 ENCOUNTER — HOSPITAL ENCOUNTER (EMERGENCY)
Age: 65
Discharge: HOME | End: 2020-04-04
Attending: EMERGENCY MEDICINE
Payer: MEDICARE

## 2020-04-04 VITALS
RESPIRATION RATE: 16 BRPM | WEIGHT: 223.77 LBS | TEMPERATURE: 98.2 F | HEART RATE: 82 BPM | HEIGHT: 67 IN | OXYGEN SATURATION: 94 % | BODY MASS INDEX: 35.12 KG/M2 | SYSTOLIC BLOOD PRESSURE: 131 MMHG | DIASTOLIC BLOOD PRESSURE: 65 MMHG

## 2020-04-04 DIAGNOSIS — R51.9 ACUTE NONINTRACTABLE HEADACHE, UNSPECIFIED HEADACHE TYPE: Primary | ICD-10-CM

## 2020-04-04 PROCEDURE — 99284 EMERGENCY DEPT VISIT MOD MDM: CPT

## 2020-04-04 PROCEDURE — 96374 THER/PROPH/DIAG INJ IV PUSH: CPT

## 2020-04-04 PROCEDURE — 96375 TX/PRO/DX INJ NEW DRUG ADDON: CPT

## 2020-04-04 PROCEDURE — 74011250636 HC RX REV CODE- 250/636: Performed by: EMERGENCY MEDICINE

## 2020-04-04 RX ORDER — DIPHENHYDRAMINE HYDROCHLORIDE 50 MG/ML
25 INJECTION, SOLUTION INTRAMUSCULAR; INTRAVENOUS ONCE
Status: COMPLETED | OUTPATIENT
Start: 2020-04-04 | End: 2020-04-04

## 2020-04-04 RX ORDER — OMEPRAZOLE 20 MG/1
20 CAPSULE, DELAYED RELEASE ORAL DAILY
COMMUNITY

## 2020-04-04 RX ORDER — KETOROLAC TROMETHAMINE 30 MG/ML
15 INJECTION, SOLUTION INTRAMUSCULAR; INTRAVENOUS
Status: COMPLETED | OUTPATIENT
Start: 2020-04-04 | End: 2020-04-04

## 2020-04-04 RX ORDER — PROCHLORPERAZINE EDISYLATE 5 MG/ML
10 INJECTION INTRAMUSCULAR; INTRAVENOUS
Status: COMPLETED | OUTPATIENT
Start: 2020-04-04 | End: 2020-04-04

## 2020-04-04 RX ADMIN — KETOROLAC TROMETHAMINE 15 MG: 30 INJECTION, SOLUTION INTRAMUSCULAR at 18:45

## 2020-04-04 RX ADMIN — PROCHLORPERAZINE EDISYLATE 10 MG: 5 INJECTION INTRAMUSCULAR; INTRAVENOUS at 18:45

## 2020-04-04 RX ADMIN — DIPHENHYDRAMINE HYDROCHLORIDE 25 MG: 50 INJECTION, SOLUTION INTRAMUSCULAR; INTRAVENOUS at 18:44

## 2020-04-04 RX ADMIN — SODIUM CHLORIDE 1000 ML: 900 INJECTION, SOLUTION INTRAVENOUS at 18:44

## 2020-04-04 NOTE — ED PROVIDER NOTES
Patient is a 75year old female with history of migraines and ankylosing spondylitis presenting to ED for headache with onset 0100 this morning. Symptoms are left-sided with associated bilateral blurred vision and photophobia. She has had migraines intemittenly over the past 3 months and is followed by neurology. She has not had relief with tylenol, toradol, or gabapentin prescribed by neuro. She has had botox injections done recently at [facility] without relief. She denies nausea, vomiting, diplopia, jaw pain, paresthesia, or other complaints at this time. Past Medical History:   Diagnosis Date    A-fib [facility])     Anxiety 7/12/2013    Arrhythmia     \"fast heart rate\".   Ablation x2    Arthritis     Asthma     Depression     Diabetes [facility] [address]     Factor V Leiden ([facility] [address]     Isthmica nodosa salpingitis 7/12/2013    Thyroid disease        Past Surgical History:   Procedure Laterality Date    ABDOMEN SURGERY PROC UNLISTED      cholecystectomy    CARDIAC SURG PROCEDURE UNLIST      ablation 2010 x2    HX CHOLECYSTECTOMY      HX HEENT      HX PACEMAKER           Family History:   Problem Relation Age of Onset    Heart Disease Father     Cancer Maternal Grandfather        Social History     Socioeconomic History    Marital status:      Spouse name: Not on file    Number of children: Not on file    Years of education: Not on file    Highest education level: Not on file   Occupational History    Not on file   Social Needs    Financial resource strain: Not on file    Food insecurity     Worry: Not on file     Inability: Not on file    Transportation needs     Medical: Not on file     Non-medical: Not on file   Tobacco Use    Smoking status: Never Smoker    Smokeless tobacco: Never Used   Substance and Sexual Activity    Alcohol use: No    Drug use: No    Sexual activity: Not Currently   Lifestyle    Physical activity     Days per week: Not on file     Minutes per session: Not on file    Stress: Not on file   Relationships    Social connections     Talks on phone: Not on file     Gets together: Not on file     Attends Judaism service: Not on file     Active member of club or organization: Not on file     Attends meetings of clubs or organizations: Not on file     Relationship status: Not on file    Intimate partner violence     Fear of current or ex partner: Not on file     Emotionally abused: Not on file     Physically abused: Not on file     Forced sexual activity: Not on file   Other Topics Concern    Not on file   Social History Narrative    Not on file         ALLERGIES: Shellfish derived; Ciprofloxacin; and Theophylline    Review of Systems   Constitutional: Negative for chills and fever. HENT: Negative for ear pain and hearing loss. Eyes: Positive for photophobia and visual disturbance (bilateral blurred vision). Respiratory: Negative for cough and shortness of breath. Cardiovascular: Negative for chest pain. Gastrointestinal: Negative for abdominal pain. Genitourinary: Negative for flank pain. Musculoskeletal: Negative for neck stiffness. Skin: Negative for color change. Neurological: Positive for headaches. Negative for dizziness, syncope and weakness. Psychiatric/Behavioral: Negative for confusion. Vitals:    04/04/20 1807   BP: 168/81   Pulse: 88   Resp: 16   Temp: 98.2 °F (36.8 °C)   SpO2: 96%   Weight: 101.5 kg (223 lb 12.3 oz)   Height: 5' 7\" (1.702 m)            Physical Exam  Vitals signs and nursing note reviewed. Constitutional:       General: She is not in acute distress. Appearance: Normal appearance. She is not ill-appearing. HENT:      Head: Normocephalic and atraumatic. Mouth/Throat:      Pharynx: Oropharynx is clear. Eyes:      Extraocular Movements: Extraocular movements intact. Conjunctiva/sclera: Conjunctivae normal.      Pupils: Pupils are equal, round, and reactive to light.    Neck:      Musculoskeletal: Normal range of motion. No neck rigidity or muscular tenderness. Cardiovascular:      Rate and Rhythm: Normal rate and regular rhythm. Pulmonary:      Effort: Pulmonary effort is normal.      Breath sounds: Normal breath sounds. Abdominal:      Palpations: Abdomen is soft. Tenderness: There is no abdominal tenderness. Musculoskeletal: Normal range of motion. Skin:     General: Skin is warm and dry. Neurological:      General: No focal deficit present. Mental Status: She is alert and oriented to person, place, and time. Cranial Nerves: No cranial nerve deficit. Sensory: No sensory deficit. Motor: No weakness. Psychiatric:         Mood and Affect: Mood normal.          MDM  Number of Diagnoses or Management Options  Acute nonintractable headache, unspecified headache type:           Assessment:  75 y.o. Female with PMH significant for ankylosis spondylitis and migraines presents with headache and associated blurry vision. No relief from home medications. Pt well appearing and in no distress. Afebrile with normal vital signs. No focal neurologic deficits. No concerning features suggestive of serious intracranial pathology such as ICH, meningitis/encephalitis, temporal arteritis, tumor/mass, trauma. DDx includes: Migraine, tension, cluster, sinus disease, dehydration, visual disturbance, viral illness. Plan:  IVF  Compazine  Benadryl  Toradol  Reassess    7:38 PM  Symptoms improved with above interventions, patient is discharged home with instructions to follow-up with neurology (Dr. Dorothy Camejo) and pain management for chronic migraine treatment. Advised her to return to ED with any worsening pain, vomiting, decreased po intake, or other severe symptoms. All questions answered.     LAMONTE Mota      Procedures

## 2020-04-04 NOTE — ED NOTES
Verbal shift change report given to Alfredo Block RN  (oncoming nurse) by Jes Rios RN (offgoing nurse). Report included the following information SBAR, Kardex, ED Summary and MAR.

## 2020-04-04 NOTE — ED NOTES
Patient ambulated to ED restroom using personal cane without assistance. Patient back on stretcher and will prepare to medicate per orders. Patient on monitor x2.

## 2020-04-04 NOTE — ED TRIAGE NOTES
Patient ambulatory to ED treatment area with steady gait, for complaint of \"I have had a migraine for the past 3 months. It is on and off and this one started about 24 hours ago. My eyes have been blurry. My neurologist gave me some gabapentin and ketorolac, which did not work. I have tried botox at [facility] and that does not work either. \" Patient reports photophobia. Pt states that her head hurts in the front and it is \"throbbing and sharp. \" Patient is alert and oriented, respirations even and unlabored.

## 2020-04-04 NOTE — ED NOTES
Purposeful rounding completed. Toileting offered, ongoing plan of care discussed and pts concerns/questions addressed. Pt resting on the stretcher in a position of comfort. Pillow provided. Lights dimmed for comfort. Call bell within reach; will continue to monitor.

## 2020-04-05 NOTE — ED NOTES
The patient was discharged home by [facility] in stable condition. The patient is alert and oriented, in no respiratory distress and discharge vital signs obtained. The patient's diagnosis, condition and treatment were explained. The patient expressed understanding. No prescriptions given. No work/school note given. A discharge plan has been developed. A  was not involved in the process. Aftercare instructions were given. Pt ambulatory out of the ED.

## 2020-04-06 ENCOUNTER — PATIENT OUTREACH (OUTPATIENT)
Dept: FAMILY MEDICINE CLINIC | Age: 65
End: 2020-04-06

## 2020-04-07 RX ORDER — LEVOTHYROXINE SODIUM 175 UG/1
TABLET ORAL
Qty: 90 TAB | Refills: 1 | Status: SHIPPED | OUTPATIENT
Start: 2020-04-07 | End: 2020-09-24

## 2020-04-10 ENCOUNTER — TELEPHONE (OUTPATIENT)
Dept: NEUROLOGY | Age: 65
End: 2020-04-10

## 2020-04-14 ENCOUNTER — OFFICE VISIT (OUTPATIENT)
Dept: NEUROLOGY | Age: 65
End: 2020-04-14

## 2020-04-14 VITALS
RESPIRATION RATE: 20 BRPM | SYSTOLIC BLOOD PRESSURE: 134 MMHG | TEMPERATURE: 97.3 F | DIASTOLIC BLOOD PRESSURE: 80 MMHG | HEIGHT: 67 IN | BODY MASS INDEX: 35.05 KG/M2

## 2020-04-14 DIAGNOSIS — R68.84 CHRONIC JAW PAIN: ICD-10-CM

## 2020-04-14 DIAGNOSIS — G50.1 ATYPICAL FACIAL PAIN: Primary | ICD-10-CM

## 2020-04-14 DIAGNOSIS — G89.29 CHRONIC JAW PAIN: ICD-10-CM

## 2020-04-14 NOTE — PROGRESS NOTES
Migraine for 3 weeks so she was in the ER here, they gave her medications   She stated the next day her right eye was blurry and she couldn't see, has seen her eye doctor and they have told her she has bad cataracts in her eyes that need to come out

## 2020-04-14 NOTE — PROCEDURES
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 Patient also having chronic moderate to severe right jaw pain around the masseter area. Here for supraorbital nerve block and trigger point injection. TIME OUT performed immediately prior to start of procedure:  Marii Shields MD, have performed the following reviews on [place] prior to the start of the procedure:      * Patient was identified by name and date of birth   * Agreement on procedure being performed was verified  * Risks and Benefits explained to the patient  * Procedure site verified and marked as necessary  * Patient was positioned for comfort  * Consent was signed and verified      Time: 0820      Date of procedure: 04/14/2020     Procedure performed by: Aureliano Owens MD     Provider assisted by: None     Patient assisted by: None     How tolerated by patient: tolerated the procedure well with no complications     Post Procedural Pain Scale: 0/10     Procedure:  Left supraorbital nerve block  Medication: 3 cc of 1% Lidocaine (preservative-free)    Informed consent was obtained. The patient was placed in a seated position. The left supraorbital ridge was palpated to identify the tenderness associated with the passing of the supraorbital nerve branch off the ophthalmic nerve (V1 division of the trigeminal nerve). The overlying skin was then sterilely prepped with alcohol. Next, using sterile technique, a 30 gauge 1/2 inch-long needle was inserted into the subcutaneous tissue in proximity to the nerve but not directly on top of the nerve. There was no blood on aspiration. The above medication solution was then gradually injected. No paresthesia were reported by the patient. The injection site was then cleansed by alcohol. The patient tolerated the procedure well. There were no complications. The patient was observed for an adequate period of time postoperatively. The patient's pain was well-controlled and there were no new neurological deficits. Right trigger point injection with 2 cc of 1% Lidocaine (preservative-free) was injected to the right masseter along the jaw line. The patient was asked to contact us if any questions arose. RTC in 1 month.

## 2020-04-20 ENCOUNTER — VIRTUAL VISIT (OUTPATIENT)
Dept: FAMILY MEDICINE CLINIC | Age: 65
End: 2020-04-20

## 2020-04-20 DIAGNOSIS — E03.9 ACQUIRED HYPOTHYROIDISM: ICD-10-CM

## 2020-04-20 DIAGNOSIS — E11.21 TYPE 2 DIABETES WITH NEPHROPATHY (HCC): Primary | ICD-10-CM

## 2020-04-20 DIAGNOSIS — G47.33 OSA (OBSTRUCTIVE SLEEP APNEA): ICD-10-CM

## 2020-04-20 NOTE — PROGRESS NOTES
Consent: Lori Roy, who was seen by synchronous (real-time) audio-video technology, and/or her healthcare decision maker, is aware that this patient-initiated, Telehealth encounter on 4/20/2020 is a billable service, with coverage as determined by her insurance carrier. She is aware that she may receive a bill and has provided verbal consent to proceed: Yes. Was diagnosed ankylosing spondylitis and this is causing pain in multiple location  She has a [facility] but firt has to have surgery to remove the bladder stimulater  She has 5 bulging discs in her spine    Assessment & Plan:   Diagnoses and all orders for this visit:    1. Type 2 diabetes with nephropathy (HCC)  Fasting bg 90 this morning  2. Acquired hypothyroidism  Normal level in march, no change needed in dose  3. KATELYNN (obstructive sleep apnea)  No longer using cpap, pulm took her off                  712  Subjective:   Lori Roy is a 75 y.o. female who was seen for No chief complaint on file. Prior to Admission medications    Medication Sig Start Date End Date Taking? Authorizing Provider   levothyroxine (SYNTHROID) 175 mcg tablet TAKE 1 TABLET BY MOUTH DAILY BEFORE BREAKFAST 4/7/20   Talon Herrera MD   omeprazole (PRILOSEC) 20 mg capsule Take 20 mg by mouth daily. Other, MD Darell   busPIRone (BUSPAR) 10 mg tablet 10 mg = 1 tab each dose, PO, tid, # 270 tab, 0 Refills 3/2/20   Provider, Historical   Jardiance 10 mg tablet  2/24/20   Provider, Historical   metoprolol tartrate (LOPRESSOR) 25 mg tablet Take 50 mg by mouth two (2) times a day. 2/13/20   Provider, Historical   potassium chloride SA (MICRO-K) 10 mEq capsule Take 20 mEq by mouth two (2) times a day. Provider, Historical   omega-3s/dha/epa/fish oil/D3 (VITAMIN-D + OMEGA-3 PO) Take  by mouth. Provider, Historical   diclofenac (VOLTAREN) 1 % gel Apply  to affected area four (4) times daily.     Provider, Historical   lisinopriL (PRINIVIL, ZESTRIL) 2.5 mg tablet Take  by mouth daily. Provider, Historical   gabapentin (NEURONTIN) 600 mg tablet Take 1/2 tab at bedtime x 1 wk; then 1 tab at bedtime x 1 wk; then 1 1/2 tab at bedtime; then 2 tab at bedtime 3/13/20   David Gloria MD   Insulin Needles, Disposable, (BD ULTRA-FINE MINI PEN NEEDLE) 31 gauge x 3/16\" ndle Use daily as directed 11/18/19   Brandee Lemons MD   insulin glargine (LANTUS,BASAGLAR) 100 unit/mL (3 mL) inpn 15 Units by SubCUTAneous route daily (after dinner). Patient taking differently: 22 Units by SubCUTAneous route daily (after dinner). 10/29/19   Brandee Lemons MD   chlorhexidine (PERIDEX) 0.12 % solution  9/4/19   Provider, Historical   LIDOCAINE VISCOUS 2 % solution  9/4/19   Provider, Historical   dulaglutide (TRULICITY) 1.14 YX/8.1 mL sub-q pen 0.5 mL by SubCUTAneous route every seven (7) days. 9/5/19   Brandee Lemons MD   albuterol (PROVENTIL HFA, VENTOLIN HFA, PROAIR HFA) 90 mcg/actuation inhaler Take 2 Puffs by inhalation every four (4) hours as needed for Shortness of Breath or Wheezing. Order states ever 4-6 hours 7/29/19   Brandee Lemons MD   buPROPion XL (WELLBUTRIN XL) 300 mg XL tablet Take 300 mg by mouth every morning. Provider, Historical   rivaroxaban (XARELTO) 20 mg tab tablet Take 20 mg by mouth daily. Provider, Historical   rOPINIRole (REQUIP) 1 mg tablet Take 2 mg by mouth nightly. Provider, Historical   traZODone (DESYREL) 150 mg tablet Take 100-200 mg by mouth nightly. Provider, Historical   ondansetron (ZOFRAN ODT) 4 mg disintegrating tablet Take 1 Tab by mouth every eight (8) hours as needed for Nausea. 7/18/18   Ian Hardin MD   DULoxetine (CYMBALTA) 60 mg capsule Take 2 Caps by mouth daily. 7/18/18   Ian Hardin MD   rosuvastatin (CRESTOR) 40 mg tablet Take 20 mg by mouth nightly. Provider, Historical   montelukast (SINGULAIR) 10 mg tablet Take 10 mg by mouth daily.     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  busPIRone (BUSPAR) 10 mg tablet 10 mg = 1 tab each dose, PO, tid, # 270 tab, 0 Refills      Jardiance 10 mg tablet       metoprolol tartrate (LOPRESSOR) 25 mg tablet Take 50 mg by mouth two (2) times a day.  potassium chloride SA (MICRO-K) 10 mEq capsule Take 20 mEq by mouth two (2) times a day.  omega-3s/dha/epa/fish oil/D3 (VITAMIN-D + OMEGA-3 PO) Take  by mouth.  diclofenac (VOLTAREN) 1 % gel Apply  to affected area four (4) times daily.  lisinopriL (PRINIVIL, ZESTRIL) 2.5 mg tablet Take  by mouth daily.  gabapentin (NEURONTIN) 600 mg tablet Take 1/2 tab at bedtime x 1 wk; then 1 tab at bedtime x 1 wk; then 1 1/2 tab at bedtime; then 2 tab at bedtime 60 Tab 1    Insulin Needles, Disposable, (BD ULTRA-FINE MINI PEN NEEDLE) 31 gauge x 3/16\" ndle Use daily as directed 100 Pen Needle 5    insulin glargine (LANTUS,BASAGLAR) 100 unit/mL (3 mL) inpn 15 Units by SubCUTAneous route daily (after dinner). (Patient taking differently: 22 Units by SubCUTAneous route daily (after dinner). ) 1 Adjustable Dose Pre-filled Pen Syringe 11    chlorhexidine (PERIDEX) 0.12 % solution       LIDOCAINE VISCOUS 2 % solution       dulaglutide (TRULICITY) 2.72 RT/2.7 mL sub-q pen 0.5 mL by SubCUTAneous route every seven (7) days. 3 Pen 5    albuterol (PROVENTIL HFA, VENTOLIN HFA, PROAIR HFA) 90 mcg/actuation inhaler Take 2 Puffs by inhalation every four (4) hours as needed for Shortness of Breath or Wheezing. Order states ever 4-6 hours 1 Inhaler 5    buPROPion XL (WELLBUTRIN XL) 300 mg XL tablet Take 300 mg by mouth every morning.  rivaroxaban (XARELTO) 20 mg tab tablet Take 20 mg by mouth daily.  rOPINIRole (REQUIP) 1 mg tablet Take 2 mg by mouth nightly.  traZODone (DESYREL) 150 mg tablet Take 100-200 mg by mouth nightly.  ondansetron (ZOFRAN ODT) 4 mg disintegrating tablet Take 1 Tab by mouth every eight (8) hours as needed for Nausea.  DULoxetine (CYMBALTA) 60 mg capsule Take 2 Caps by mouth daily.  rosuvastatin (CRESTOR) 40 mg tablet Take 20 mg by mouth nightly.  montelukast (SINGULAIR) 10 mg tablet Take 10 mg by mouth daily. ROS        Objective:   Vital Signs: (As obtained by patient/caregiver at home)  There were no vitals taken for this visit.      [INSTRUCTIONS:  \"[x]\" Indicates a positive item  \"[]\" Indicates a negative item  -- DELETE ALL ITEMS NOT EXAMINED]    Constitutional: [x] Appears well-developed and well-nourished [x] No apparent distress      [] Abnormal -     Mental status: [x] Alert and awake  [x] Oriented to person/place/time [x] Able to follow commands    [] Abnormal -     Eyes:   EOM    [x]  Normal    [] Abnormal -   Sclera  [x]  Normal    [] Abnormal -          Discharge [x]  None visible   [] Abnormal -     HENT: [x] Normocephalic, atraumatic  [] Abnormal -   [x] Mouth/Throat: Mucous membranes are moist    External Ears [x] Normal  [] Abnormal -    Neck: [x] No visualized mass [] Abnormal -     Pulmonary/Chest: [x] Respiratory effort normal   [x] No visualized signs of difficulty breathing or respiratory distress        [] Abnormal -      Musculoskeletal:   [x] Normal gait with no signs of ataxia         [x] Normal range of motion of neck        [] Abnormal -     Neurological:        [x] No Facial Asymmetry (Cranial nerve 7 motor function) (limited exam due to video visit)          [x] No gaze palsy        [] Abnormal -          Skin:        [x] No significant exanthematous lesions or discoloration noted on facial skin         [] Abnormal -            Psychiatric:       [x] Normal Affect [] Abnormal -        [x] No Hallucinations    Other pertinent observable physical exam findings:-        We discussed the expected course, resolution and complications of the diagnosis(es) in detail. Medication risks, benefits, costs, interactions, and alternatives were discussed as indicated. I advised her to contact the office if her condition worsens, changes or fails to improve as anticipated. She expressed understanding with the diagnosis(es) and plan. Johnathan Daily is a 75 y.o. female being evaluated by a video visit encounter for concerns as above. A caregiver was present when appropriate. Due to this being a TeleHealth encounter ([facility]), evaluation of the following organ systems was limited: Vitals/Constitutional/EENT/Resp/CV/GI//MS/Neuro/Skin/Heme-Lymph-Imm. Pursuant to the emergency declaration under the [facility], 1135 waiver authority and the [employer] Act, this Virtual  Visit was conducted, with patient's (and/or legal guardian's) consent, to reduce the patient's risk of exposure to COVID-19 and provide necessary medical care. Services were provided through a video synchronous discussion virtually to substitute for in-person clinic visit. Patient and provider were located at their individual homes.         Chepe Shahid MD

## 2020-04-21 ENCOUNTER — DOCUMENTATION ONLY (OUTPATIENT)
Dept: FAMILY MEDICINE CLINIC | Age: 65
End: 2020-04-21

## 2020-04-21 NOTE — PROGRESS NOTES
Trulicity 1.49MZ submitted to [facility] via Cover My Meds. Awaiting reponse.    [id number]

## 2020-04-24 NOTE — PROGRESS NOTES
Per [facility] the PA for Trulicity 5.80KE has been approved until further notice because the request is within quantity limits of 2mL per 28 days. Ref#: [id number]    Pharm notified via fax.

## 2020-04-26 ENCOUNTER — HOSPITAL ENCOUNTER (EMERGENCY)
Age: 65
Discharge: HOME | End: 2020-04-26
Attending: EMERGENCY MEDICINE
Payer: MEDICARE

## 2020-04-26 VITALS
BODY MASS INDEX: 36.35 KG/M2 | WEIGHT: 226.19 LBS | DIASTOLIC BLOOD PRESSURE: 92 MMHG | OXYGEN SATURATION: 99 % | HEART RATE: 94 BPM | RESPIRATION RATE: 16 BRPM | SYSTOLIC BLOOD PRESSURE: 157 MMHG | HEIGHT: 66 IN | TEMPERATURE: 98.5 F

## 2020-04-26 DIAGNOSIS — M19.90 ARTHRITIS: Primary | ICD-10-CM

## 2020-04-26 DIAGNOSIS — R42 DIZZINESS: ICD-10-CM

## 2020-04-26 PROCEDURE — 99282 EMERGENCY DEPT VISIT SF MDM: CPT

## 2020-04-26 RX ORDER — PREDNISONE 20 MG/1
TABLET ORAL
Qty: 20 TAB | Refills: 0 | Status: SHIPPED | OUTPATIENT
Start: 2020-04-26

## 2020-04-26 RX ORDER — TRAMADOL HYDROCHLORIDE 50 MG/1
50 TABLET ORAL
Qty: 10 TAB | Refills: 0 | Status: SHIPPED | OUTPATIENT
Start: 2020-04-26 | End: 2020-04-29

## 2020-04-26 RX ORDER — ACETAMINOPHEN 500 MG
1000 TABLET ORAL
COMMUNITY

## 2020-04-26 NOTE — ED PROVIDER NOTES
HPI the patient complains of chronic dizziness, generalized pain and fatigue. The symptoms have been present for approximately 1 year. She has been diagnosed with ankylosing spondylitis as a cause of her pain. She is using extra strength Tylenol with no relief. Her pain is in bilateral lower extremities right ribs and neck. She denies cough, shortness of breath, fever, vomiting, chest pain or abdominal pain. She is here today asking for something stronger for her pain. Past Medical History:   Diagnosis Date    A-fib [facility])     Anxiety 7/12/2013    Arrhythmia     \"fast heart rate\".   Ablation x2    Arthritis     Asthma     Depression     Diabetes ([facility]     Factor V Leiden ([facility]     Isthmica nodosa salpingitis 7/12/2013    Thyroid disease        Past Surgical History:   Procedure Laterality Date    ABDOMEN SURGERY PROC UNLISTED      cholecystectomy    CARDIAC SURG PROCEDURE UNLIST      ablation 2010 x2    HX CHOLECYSTECTOMY      HX HEENT      HX PACEMAKER           Family History:   Problem Relation Age of Onset    Heart Disease Father     Cancer Maternal Grandfather        Social History     Socioeconomic History    Marital status:      Spouse name: Not on file    Number of children: Not on file    Years of education: Not on file    Highest education level: Not on file   Occupational History    Not on file   Social Needs    Financial resource strain: Not on file    Food insecurity     Worry: Not on file     Inability: Not on file    Transportation needs     Medical: Not on file     Non-medical: Not on file   Tobacco Use    Smoking status: Never Smoker    Smokeless tobacco: Never Used   Substance and Sexual Activity    Alcohol use: No    Drug use: No    Sexual activity: Not Currently   Lifestyle    Physical activity     Days per week: Not on file     Minutes per session: Not on file    Stress: Not on file   Relationships    Social connections     Talks on phone: Not on file     Gets together: Not on file     Attends Religion service: Not on file     Active member of club or organization: Not on file     Attends meetings of clubs or organizations: Not on file     Relationship status: Not on file    Intimate partner violence     Fear of current or ex partner: Not on file     Emotionally abused: Not on file     Physically abused: Not on file     Forced sexual activity: Not on file   Other Topics Concern    Not on file   Social History Narrative    Not on file         ALLERGIES: Shellfish derived; Ciprofloxacin; and Theophylline    Review of Systems   Constitutional: Negative for fever. Respiratory: Negative for cough and shortness of breath. Cardiovascular: Negative for chest pain and leg swelling. Gastrointestinal: Negative for abdominal pain, diarrhea, nausea and vomiting. Genitourinary: Negative for flank pain. Musculoskeletal: Positive for arthralgias, back pain and neck pain. Negative for neck stiffness. Skin: Negative for rash. Neurological: Positive for dizziness. Negative for syncope and headaches. Psychiatric/Behavioral: Negative for confusion. All other systems reviewed and are negative. Vitals:    04/26/20 1538   BP: (!) 157/92            Physical Exam  Constitutional:       General: She is not in acute distress. Appearance: She is well-developed. HENT:      Head: Normocephalic. Eyes:      Pupils: Pupils are equal, round, and reactive to light. Neck:      Musculoskeletal: Normal range of motion. Cardiovascular:      Rate and Rhythm: Normal rate. Heart sounds: No murmur. Pulmonary:      Effort: Pulmonary effort is normal.      Breath sounds: Normal breath sounds. Abdominal:      Palpations: Abdomen is soft. Tenderness: There is no abdominal tenderness. Musculoskeletal: Normal range of motion. Comments: The lower extremities are tender to light touch diffusely.   There is no edema and distal pulses are normal. Skin:     General: Skin is warm and dry. Capillary Refill: Capillary refill takes less than 2 seconds. Neurological:      General: No focal deficit present. Mental Status: She is alert and oriented to person, place, and time.    Psychiatric:         Behavior: Behavior normal.          MDM       Procedures

## 2020-04-26 NOTE — ED TRIAGE NOTES
Pt presents to ED with c/o chronic back pain and dizziness over the past year. Pt states she was recently diagnosed with \"a rare form of arthritis\" \"I need something for pain. \"

## 2020-04-26 NOTE — DISCHARGE INSTRUCTIONS
Patient Education        Arthritis: Care Instructions  Your Care Instructions  Arthritis, also called osteoarthritis, is a breakdown of the cartilage that cushions your joints. When the cartilage wears down, your bones rub against each other. This causes pain and stiffness. Many people have some arthritis as they age. Arthritis most often affects the joints of the spine, hands, hips, knees, or feet. You can take simple measures to protect your joints, ease your pain, and help you stay active. Follow-up care is a key part of your treatment and safety. Be sure to make and go to all appointments, and call your doctor if you are having problems. It's also a good idea to know your test results and keep a list of the medicines you take. How can you care for yourself at home? · Stay at a healthy weight. Being overweight puts extra strain on your joints. · Talk to your doctor or physical therapist about exercises that will help ease joint pain. ? Stretch. You may enjoy gentle forms of yoga to help keep your joints and muscles flexible. ? Walk instead of jog. Other types of exercise that are less stressful on the joints include riding a bicycle, swimming, eliot chi, or water exercise. ? Lift weights. Strong muscles help reduce stress on your joints. Stronger thigh muscles, for example, take some of the stress off of the knees and hips. Learn the right way to lift weights so you do not make joint pain worse. · Take your medicines exactly as prescribed. Call your doctor if you think you are having a problem with your medicine. · Take pain medicines exactly as directed. ? If the doctor gave you a prescription medicine for pain, take it as prescribed. ? If you are not taking a prescription pain medicine, ask your doctor if you can take an over-the-counter medicine. · Use a cane, crutch, walker, or another device if you need help to get around. These can help rest your joints.  You also can use other things to make life easier, such as a higher toilet seat and padded handles on kitchen utensils. · Do not sit in low chairs, which can make it hard to get up. · Put heat or cold on your sore joints as needed. Use whichever helps you most. You also can take turns with hot and cold packs. ? Apply heat 2 or 3 times a day for 20 to 30 minutes--using a heating pad, hot shower, or hot pack--to relieve pain and stiffness. ? Put ice or a cold pack on your sore joint for 10 to 20 minutes at a time. Put a thin cloth between the ice and your skin. When should you call for help? Call your doctor now or seek immediate medical care if:    · You have sudden swelling, warmth, or pain in any joint.     · You have joint pain and a fever or rash.     · You have such bad pain that you cannot use a joint.    Watch closely for changes in your health, and be sure to contact your doctor if:    · You have mild joint symptoms that continue even with more than 6 weeks of care at home.     · You have stomach pain or other problems with your medicine. Where can you learn more? Go to http://junior-mckayla.info/  Enter D914 in the search box to learn more about \"Arthritis: Care Instructions. \"  Current as of: December 8, 2019Content Version: 12.4  © 5915-4140 Healthwise, Incorporated. Care instructions adapted under license by [employer] (which disclaims liability or warranty for this information). If you have questions about a medical condition or this instruction, always ask your healthcare professional. [place] [address] any warranty or liability for your use of this information. Patient Education        Dizziness: Care Instructions  Your Care Instructions  Dizziness is the feeling of unsteadiness or fuzziness in your head. It is different than having vertigo, which is a feeling that the room is spinning or that you are moving or falling.  It is also different from lightheadedness, which is the feeling that you are about to faint. It can be hard to know what causes dizziness. Some people feel dizzy when they have migraine headaches. Sometimes bouts of flu can make you feel dizzy. Some medical conditions, such as heart problems or high blood pressure, can make you feel dizzy. Many medicines can cause dizziness, including medicines for high blood pressure, pain, or anxiety. If a medicine causes your symptoms, your doctor may recommend that you stop or change the medicine. If it is a problem with your heart, you may need medicine to help your heart work better. If there is no clear reason for your symptoms, your doctor may suggest watching and waiting for a while to see if the dizziness goes away on its own. Follow-up care is a key part of your treatment and safety. Be sure to make and go to all appointments, and call your doctor if you are having problems. It's also a good idea to know your test results and keep a list of the medicines you take. How can you care for yourself at home? · If your doctor recommends or prescribes medicine, take it exactly as directed. Call your doctor if you think you are having a problem with your medicine. · Do not drive while you feel dizzy. · Try to prevent falls. Steps you can take include:  ? Using nonskid mats, adding grab bars near the tub, and using night-lights. ? Clearing your home so that walkways are free of anything you might trip on.  ? Letting family and friends know that you have been feeling dizzy. This will help them know how to help you. When should you call for help? Call 911 anytime you think you may need emergency care. For example, call if:    · You passed out (lost consciousness).     · You have dizziness along with symptoms of a heart attack. These may include:  ? Chest pain or pressure, or a strange feeling in the chest.  ? Sweating. ? Shortness of breath. ? Nausea or vomiting.   ? Pain, pressure, or a strange feeling in the back, neck, jaw, or upper belly or in one or both shoulders or arms. ? Lightheadedness or sudden weakness. ? A fast or irregular heartbeat.     · You have symptoms of a stroke. These may include:  ? Sudden numbness, tingling, weakness, or loss of movement in your face, arm, or leg, especially on only one side of your body. ? Sudden vision changes. ? Sudden trouble speaking. ? Sudden confusion or trouble understanding simple statements. ? Sudden problems with walking or balance. ? A sudden, severe headache that is different from past headaches.    Call your doctor now or seek immediate medical care if:    · You feel dizzy and have a fever, headache, or ringing in your ears.     · You have new or increased nausea and vomiting.     · Your dizziness does not go away or comes back.    Watch closely for changes in your health, and be sure to contact your doctor if:    · You do not get better as expected. Where can you learn more? Go to http://junior-mckayla.info/  Enter Q823 in the search box to learn more about \"Dizziness: Care Instructions. \"  Current as of: June 26, 2019Content Version: 12.4  © 9884-2890 Healthwise, Incorporated. Care instructions adapted under license by [employer] (which disclaims liability or warranty for this information). If you have questions about a medical condition or this instruction, always ask your healthcare professional. [address] any warranty or liability for your use of this information.

## 2020-04-27 ENCOUNTER — PATIENT OUTREACH (OUTPATIENT)
Dept: FAMILY MEDICINE CLINIC | Age: 65
End: 2020-04-27

## 2020-04-27 ENCOUNTER — VIRTUAL VISIT (OUTPATIENT)
Dept: FAMILY MEDICINE CLINIC | Age: 65
End: 2020-04-27

## 2020-04-27 DIAGNOSIS — M62.838 MUSCLE SPASM: Primary | ICD-10-CM

## 2020-04-27 RX ORDER — TIZANIDINE 4 MG/1
4 TABLET ORAL 3 TIMES DAILY
Qty: 90 TAB | Refills: 1 | Status: SHIPPED | OUTPATIENT
Start: 2020-04-27

## 2020-04-27 NOTE — PROGRESS NOTES
Patient contacted regarding recent discharge and COVID-19 risk   Care Transition Nurse/ Ambulatory Care Manager contacted the patient by telephone to perform post discharge assessment. Verified name and  with patient as identifiers. Patient has following risk factors of: diabetes. CTN/ACM reviewed discharge instructions, medical action plan and red flags related to discharge diagnosis. Reviewed and educated them on any new and changed medications related to discharge diagnosis. Advised obtaining a 90-day supply of all daily and as-needed medications. Education provided regarding infection prevention, and signs and symptoms of COVID-19 and when to seek medical attention with patient who verbalized understanding. Discussed exposure protocols and quarantine from [address] you at higher risk for severe illness  and given an opportunity for questions and concerns. The patient agrees to contact the COVID-19 hotline [phone] or PCP office for questions related to their healthcare. CTN/ACM provided contact information for future reference. From CDC: Are you at higher risk for severe illness?  Wash your hands often.  Avoid close contact (6 feet, which is about two arm lengths) with people who are sick.  Put distance between yourself and other people if COVID-19 is spreading in your community.  Clean and disinfect frequently touched surfaces.  Avoid all cruise travel and non-essential air travel.  Call your healthcare professional if you have concerns about COVID-19 and your underlying condition or if you are sick. For more information on steps you can take to protect yourself, see CDC's How to Protect Yourself      Patient/family/caregiver given information for Benita Rodriguez and agrees to enroll yes  Patient's preferred e-mail:  [email or web address]  Patient's preferred phone number: [phone]  Based on Loop alert triggers, patient will be contacted by nurse care manager for worsening symptoms. Pt will be further monitored by COVID Loop Team based on severity of symptoms and risk factors.

## 2020-04-27 NOTE — PROGRESS NOTES
Dave King is a 75 y.o. female who was seen by synchronous (real-time) audio-video technology on 4/27/2020. Consent: Dave King, who was seen by synchronous (real-time) audio-video technology, and/or her healthcare decision maker, is aware that this patient-initiated, Telehealth encounter on 4/27/2020 is a billable service, with coverage as determined by her insurance carrier. She is aware that she may receive a bill and has provided verbal consent to proceed: Yes. Went to [place] [facility] yesterday. Given prednisone for a slow taper  She has a referral to [facility] rheum    Assessment & Plan:   Diagnoses and all orders for this visit:    1. Muscle spasm  -     tiZANidine (ZANAFLEX) 4 mg tablet; Take 1 Tab by mouth three (3) times daily. I think the HA and the pain are being set off by spasm in the trapezius  If this does not help I will switch to topamax          Subjective:   Dave King is a 75 y.o. female who was seen for No chief complaint on file. Prior to Admission medications    Medication Sig Start Date End Date Taking? Authorizing Provider   tiZANidine (ZANAFLEX) 4 mg tablet Take 1 Tab by mouth three (3) times daily. 4/27/20  Yes Chelsie Willis MD   acetaminophen (TYLENOL) 500 mg tablet Take 1,000 mg by mouth every six (6) hours as needed for Pain. Rae, MD Darell   predniSONE (DELTASONE) 20 mg tablet 2 qd for 4 d. 1 1/2 qd for 4 d. 1 qd for 4 d, 1/2 qd for 4 d. 4/26/20   Miladys Silva MD   traMADoL [place]) 50 mg tablet Take 1 Tab by mouth every six (6) hours as needed for Pain for up to 3 days. Max Daily Amount: 200 mg. 4/26/20 4/29/20  Miladys Silva MD   levothyroxine (SYNTHROID) 175 mcg tablet TAKE 1 TABLET BY MOUTH DAILY BEFORE BREAKFAST 4/7/20   Chelsie Willis MD   omeprazole (PRILOSEC) 20 mg capsule Take 20 mg by mouth daily.     Other, MD Darell   busPIRone (BUSPAR) 10 mg tablet 10 mg = 1 tab each dose, PO, tid, # 270 tab, 0 Refills 3/2/20   Provider, Historical Jardiance 10 mg tablet  2/24/20   Provider, Historical   metoprolol tartrate (LOPRESSOR) 25 mg tablet Take 50 mg by mouth two (2) times a day. 2/13/20   Provider, Historical   potassium chloride SA (MICRO-K) 10 mEq capsule Take 20 mEq by mouth two (2) times a day. Provider, Historical   omega-3s/dha/epa/fish oil/D3 (VITAMIN-D + OMEGA-3 PO) Take  by mouth. Provider, Historical   diclofenac (VOLTAREN) 1 % gel Apply  to affected area four (4) times daily. Provider, Historical   lisinopriL (PRINIVIL, ZESTRIL) 2.5 mg tablet Take  by mouth daily. Provider, Historical   gabapentin (NEURONTIN) 600 mg tablet Take 1/2 tab at bedtime x 1 wk; then 1 tab at bedtime x 1 wk; then 1 1/2 tab at bedtime; then 2 tab at bedtime 3/13/20   Yajaira Membreno MD   Insulin Needles, Disposable, (BD ULTRA-FINE MINI PEN NEEDLE) 31 gauge x 3/16\" ndle Use daily as directed 11/18/19   Ginny Aaron MD   insulin glargine (LANTUS,BASAGLAR) 100 unit/mL (3 mL) inpn 15 Units by SubCUTAneous route daily (after dinner). Patient taking differently: 22 Units by SubCUTAneous route daily (after dinner). 10/29/19   Ginny Aaron MD   chlorhexidine (PERIDEX) 0.12 % solution  9/4/19   Provider, Historical   LIDOCAINE VISCOUS 2 % solution  9/4/19   Provider, Historical   dulaglutide (TRULICITY) 7.09 TW/2.6 mL sub-q pen 0.5 mL by SubCUTAneous route every seven (7) days. 9/5/19   Ginny Aaron MD   albuterol (PROVENTIL HFA, VENTOLIN HFA, PROAIR HFA) 90 mcg/actuation inhaler Take 2 Puffs by inhalation every four (4) hours as needed for Shortness of Breath or Wheezing. Order states ever 4-6 hours 7/29/19   Ginny Aaron MD   buPROPion XL (WELLBUTRIN XL) 300 mg XL tablet Take 300 mg by mouth every morning. Provider, Historical   rivaroxaban (XARELTO) 20 mg tab tablet Take 20 mg by mouth daily. Provider, Historical   rOPINIRole (REQUIP) 1 mg tablet Take 2 mg by mouth nightly.     Provider, Historical   traZODone (DESYREL) 150 mg tablet Take 100-200 mg by mouth nightly. Provider, Historical   ondansetron (ZOFRAN ODT) 4 mg disintegrating tablet Take 1 Tab by mouth every eight (8) hours as needed for Nausea. 7/18/18   Jhon Hardin MD   DULoxetine (CYMBALTA) 60 mg capsule Take 2 Caps by mouth daily. 7/18/18   Jhon Hardin MD   rosuvastatin (CRESTOR) 40 mg tablet Take 20 mg by mouth nightly. Provider, Historical   montelukast (SINGULAIR) 10 mg tablet Take 10 mg by mouth daily. Provider, Historical     Allergies   Allergen Reactions    Shellfish Derived Anaphylaxis    Ciprofloxacin Hives    Theophylline Hives           ROS    Objective:   Vital Signs: (As obtained by patient/caregiver at home)  There were no vitals taken for this visit.      [INSTRUCTIONS:  \"[x]\" Indicates a positive item  \"[]\" Indicates a negative item  -- DELETE ALL ITEMS NOT EXAMINED]    Constitutional: [x] Appears well-developed and well-nourished [x] No apparent distress      [] Abnormal -     Mental status: [x] Alert and awake  [x] Oriented to person/place/time [x] Able to follow commands    [] Abnormal -     Eyes:   EOM    [x]  Normal    [] Abnormal -   Sclera  [x]  Normal    [] Abnormal -          Discharge [x]  None visible   [] Abnormal -     HENT: [x] Normocephalic, atraumatic  [] Abnormal -   [x] Mouth/Throat: Mucous membranes are moist    External Ears [x] Normal  [] Abnormal -    Neck: [x] No visualized mass [] Abnormal -     Pulmonary/Chest: [x] Respiratory effort normal   [x] No visualized signs of difficulty breathing or respiratory distress        [] Abnormal -      Musculoskeletal:   [x] Normal gait with no signs of ataxia         [x] Normal range of motion of neck        [] Abnormal -     Neurological:        [x] No Facial Asymmetry (Cranial nerve 7 motor function) (limited exam due to video visit)          [x] No gaze palsy        [] Abnormal -          Skin:        [x] No significant exanthematous lesions or discoloration noted on facial skin         [] Abnormal -            Psychiatric:       [x] Normal Affect [] Abnormal -        [x] No Hallucinations    Other pertinent observable physical exam findings:-        We discussed the expected course, resolution and complications of the diagnosis(es) in detail. Medication risks, benefits, costs, interactions, and alternatives were discussed as indicated. I advised her to contact the office if her condition worsens, changes or fails to improve as anticipated. She expressed understanding with the diagnosis(es) and plan. Ute Del Real is a 75 y.o. female who was evaluated by a video visit encounter for concerns as above. Patient identification was verified prior to start of the visit. A caregiver was present when appropriate. Due to this being a TeleHealth encounter (During [facility] public [facility] emergency), evaluation of the following organ systems was limited: Vitals/Constitutional/EENT/Resp/CV/GI//MS/Neuro/Skin/Heme-Lymph-Imm. Pursuant to the emergency declaration under the [facility], [facility]5 waiver authority and the [employer] and [employer] Act, this Virtual  Visit was conducted, with patient's (and/or legal guardian's) consent, to reduce the patient's risk of exposure to COVID-19 and provide necessary medical care. Services were provided through a video synchronous discussion virtually to substitute for in-person clinic visit. Patient and provider were located at their individual homes.       Dang Alvarez MD

## 2020-05-27 DIAGNOSIS — E11.21 TYPE 2 DIABETES WITH NEPHROPATHY (HCC): ICD-10-CM

## 2020-05-27 RX ORDER — INSULIN GLARGINE 100 [IU]/ML
22 INJECTION, SOLUTION SUBCUTANEOUS
Qty: 3 ADJUSTABLE DOSE PRE-FILLED PEN SYRINGE | Refills: 0 | Status: SHIPPED | OUTPATIENT
Start: 2020-05-27 | End: 2020-06-28

## 2020-06-01 ENCOUNTER — VIRTUAL VISIT (OUTPATIENT)
Dept: FAMILY MEDICINE CLINIC | Age: 65
End: 2020-06-01

## 2020-06-01 DIAGNOSIS — R51.9 SUPRAORBITAL HEADACHE: Primary | ICD-10-CM

## 2020-06-05 NOTE — PROGRESS NOTES
Quang Gooden is a 77 y.o. female who was seen by synchronous (real-time) audio-video technology on 6/1/2020. Consent: Quang Gooden, who was seen by synchronous (real-time) audio-video technology, and/or her healthcare decision maker, is aware that this patient-initiated, Telehealth encounter on 6/1/2020 is a billable service, with coverage as determined by her insurance carrier. She is aware that she may receive a bill and has provided verbal consent to proceed: Yes. 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 Supraorbital headache    make appt w the neurologist who did the injectio for the neuralgi which is DR alonso   she will call and get on someones schedule        Subjective:   Quang Gooden is a 77 y.o. female who was seen for No chief complaint on file. Prior to Admission medications    Medication Sig Start Date End Date Taking? Authorizing Provider   insulin glargine (LANTUS,BASAGLAR) 100 unit/mL (3 mL) inpn 22 Units by SubCUTAneous route daily (after dinner). 5/27/20   Chio Sanders MD   tiZANidine (ZANAFLEX) 4 mg tablet Take 1 Tab by mouth three (3) times daily. 4/27/20   Chio Sanders MD   acetaminophen (TYLENOL) 500 mg tablet Take 1,000 mg by mouth every six (6) hours as needed for Pain.     Other, MD Darell   predniSONE (DELTASONE) 20 mg tablet 2 qd for 4 d. 1 1/2 qd for 4 d. 1 qd for 4 d, 1/2 qd for 4 d. 4/26/20   Glynn Bevaer MD   levothyroxine (SYNTHROID) 175 mcg tablet TAKE 1 TABLET BY MOUTH DAILY BEFORE BREAKFAST 4/7/20   Gely Augustin MD   omeprazole (PRILOSEC) 20 mg capsule Take 20 mg by mouth daily. Other, MD Darell   busPIRone (BUSPAR) 10 mg tablet 10 mg = 1 tab each dose, PO, tid, # 270 tab, 0 Refills 3/2/20   Provider, Historical   Jardiance 10 mg tablet  2/24/20   Provider, Historical   metoprolol tartrate (LOPRESSOR) 25 mg tablet Take 50 mg by mouth two (2) times a day. 2/13/20   Provider, Historical   potassium chloride SA (MICRO-K) 10 mEq capsule Take 20 mEq by mouth two (2) times a day. Provider, Historical   omega-3s/dha/epa/fish oil/D3 (VITAMIN-D + OMEGA-3 PO) Take  by mouth. Provider, Historical   diclofenac (VOLTAREN) 1 % gel Apply  to affected area four (4) times daily. Provider, Historical   lisinopriL (PRINIVIL, ZESTRIL) 2.5 mg tablet Take  by mouth daily. Provider, Historical   gabapentin (NEURONTIN) 600 mg tablet Take 1/2 tab at bedtime x 1 wk; then 1 tab at bedtime x 1 wk; then 1 1/2 tab at bedtime; then 2 tab at bedtime 3/13/20   Amara Sheth MD   Insulin Needles, Disposable, (BD ULTRA-FINE MINI PEN NEEDLE) 31 gauge x 3/16\" ndle Use daily as directed 11/18/19   Gely Augustin MD   chlorhexidine (PERIDEX) 0.12 % solution  9/4/19   Provider, Historical   LIDOCAINE VISCOUS 2 % solution  9/4/19   Provider, Historical   dulaglutide (TRULICITY) 6.09 XA/2.0 mL sub-q pen 0.5 mL by SubCUTAneous route every seven (7) days. 9/5/19   Gely Augustin MD   albuterol (PROVENTIL HFA, VENTOLIN HFA, PROAIR HFA) 90 mcg/actuation inhaler Take 2 Puffs by inhalation every four (4) hours as needed for Shortness of Breath or Wheezing. Order states ever 4-6 hours 7/29/19   Gely Augustin MD   buPROPion XL (WELLBUTRIN XL) 300 mg XL tablet Take 300 mg by mouth every morning. Provider, Historical   rivaroxaban (XARELTO) 20 mg tab tablet Take 20 mg by mouth daily. Provider, Historical   rOPINIRole (REQUIP) 1 mg tablet Take 2 mg by mouth nightly. Provider, Historical   traZODone (DESYREL) 150 mg tablet Take 100-200 mg by mouth nightly. Provider, Historical   ondansetron (ZOFRAN ODT) 4 mg disintegrating tablet Take 1 Tab by mouth every eight (8) hours as needed for Nausea. 7/18/18   Virginia Hardin MD   DULoxetine (CYMBALTA) 60 mg capsule Take 2 Caps by mouth daily. 7/18/18   Virginia Hardin MD   rosuvastatin (CRESTOR) 40 mg tablet Take 20 mg by mouth nightly. Provider, Historical   montelukast (SINGULAIR) 10 mg tablet Take 10 mg by mouth daily. Provider, Historical     Allergies   Allergen Reactions    Shellfish Derived Anaphylaxis    Ciprofloxacin Hives    Theophylline Hives       Patient Active Problem List    Diagnosis Date Noted    Acquired hypothyroidism 09/25/2018    Type 2 diabetes with nephropathy ([facility] [address] 07/27/2018    Severe obesity (BMI 35.0-39.9) 07/26/2018    Asthma 06/30/2018    Diabetes ([facility] [address]     Factor V Leiden ([facility] [address]     A-fib ([facility] [address]     Supraorbital headache 10/07/2015    Isthmica nodosa salpingitis 07/12/2013    Anxiety 07/12/2013    Essential and other specified forms of tremor 07/12/2013     Current Outpatient Medications   Medication Sig Dispense Refill    insulin glargine (LANTUS,BASAGLAR) 100 unit/mL (3 mL) inpn 22 Units by SubCUTAneous route daily (after dinner). 3 Adjustable Dose Pre-filled Pen Syringe 0    tiZANidine (ZANAFLEX) 4 mg tablet Take 1 Tab by mouth three (3) times daily. 90 Tab 1    acetaminophen (TYLENOL) 500 mg tablet Take 1,000 mg by mouth every six (6) hours as needed for Pain.  predniSONE (DELTASONE) 20 mg tablet 2 qd for 4 d. 1 1/2 qd for 4 d. 1 qd for 4 d, 1/2 qd for 4 d. 20 Tab 0    levothyroxine (SYNTHROID) 175 mcg tablet TAKE 1 TABLET BY MOUTH DAILY BEFORE BREAKFAST 90 Tab 1    omeprazole (PRILOSEC) 20 mg capsule Take 20 mg by mouth daily.       busPIRone (BUSPAR) 10 mg tablet 10 mg = 1 tab each dose, PO, tid, # 270 tab, 0 Refills      Jardiance 10 mg tablet       metoprolol tartrate (LOPRESSOR) 25 mg tablet Take 50 mg by mouth two (2) times a day.  potassium chloride SA (MICRO-K) 10 mEq capsule Take 20 mEq by mouth two (2) times a day.  omega-3s/dha/epa/fish oil/D3 (VITAMIN-D + OMEGA-3 PO) Take  by mouth.  diclofenac (VOLTAREN) 1 % gel Apply  to affected area four (4) times daily.  lisinopriL (PRINIVIL, ZESTRIL) 2.5 mg tablet Take  by mouth daily.  gabapentin (NEURONTIN) 600 mg tablet Take 1/2 tab at bedtime x 1 wk; then 1 tab at bedtime x 1 wk; then 1 1/2 tab at bedtime; then 2 tab at bedtime 60 Tab 1    Insulin Needles, Disposable, (BD ULTRA-FINE MINI PEN NEEDLE) 31 gauge x 3/16\" ndle Use daily as directed 100 Pen Needle 5    chlorhexidine (PERIDEX) 0.12 % solution       LIDOCAINE VISCOUS 2 % solution       dulaglutide (TRULICITY) 7.04 MI/8.6 mL sub-q pen 0.5 mL by SubCUTAneous route every seven (7) days. 3 Pen 5    albuterol (PROVENTIL HFA, VENTOLIN HFA, PROAIR HFA) 90 mcg/actuation inhaler Take 2 Puffs by inhalation every four (4) hours as needed for Shortness of Breath or Wheezing. Order states ever 4-6 hours 1 Inhaler 5    buPROPion XL (WELLBUTRIN XL) 300 mg XL tablet Take 300 mg by mouth every morning.  rivaroxaban (XARELTO) 20 mg tab tablet Take 20 mg by mouth daily.  rOPINIRole (REQUIP) 1 mg tablet Take 2 mg by mouth nightly.  traZODone (DESYREL) 150 mg tablet Take 100-200 mg by mouth nightly.  ondansetron (ZOFRAN ODT) 4 mg disintegrating tablet Take 1 Tab by mouth every eight (8) hours as needed for Nausea.  DULoxetine (CYMBALTA) 60 mg capsule Take 2 Caps by mouth daily.  rosuvastatin (CRESTOR) 40 mg tablet Take 20 mg by mouth nightly.  montelukast (SINGULAIR) 10 mg tablet Take 10 mg by mouth daily. ROS    Objective:   Vital Signs: (As obtained by patient/caregiver at home)  There were no vitals taken for this visit.      [INSTRUCTIONS:  \"[x]\" Indicates a positive item  \"[]\" Indicates a negative item  -- DELETE ALL ITEMS NOT EXAMINED]    Constitutional: [x] Appears well-developed and well-nourished [x] No apparent distress      [] Abnormal -     Mental status: [x] Alert and awake  [x] Oriented to person/place/time [x] Able to follow commands    [] Abnormal -     Eyes:   EOM    [x]  Normal    [] Abnormal -   Sclera  [x]  Normal    [] Abnormal -          Discharge [x]  None visible   [] Abnormal -     HENT: [x] Normocephalic, atraumatic  [] Abnormal -   [x] Mouth/Throat: Mucous membranes are moist    External Ears [x] Normal  [] Abnormal -    Neck: [x] No visualized mass [] Abnormal -     Pulmonary/Chest: [x] Respiratory effort normal   [x] No visualized signs of difficulty breathing or respiratory distress        [] Abnormal -      Musculoskeletal:   [x] Normal gait with no signs of ataxia         [x] Normal range of motion of neck        [] Abnormal -     Neurological:        [x] No Facial Asymmetry (Cranial nerve 7 motor function) (limited exam due to video visit)          [x] No gaze palsy        [] Abnormal -          Skin:        [x] No significant exanthematous lesions or discoloration noted on facial skin         [] Abnormal -            Psychiatric:       [x] Normal Affect [] Abnormal -        [x] No Hallucinations    Other pertinent observable physical exam findings:-        We discussed the expected course, resolution and complications of the diagnosis(es) in detail. Medication risks, benefits, costs, interactions, and alternatives were discussed as indicated. I advised her to contact the office if her condition worsens, changes or fails to improve as anticipated. She expressed understanding with the diagnosis(es) and plan. Johnathan Daily is a 77 y.o. female who was evaluated by a video visit encounter for concerns as above. Patient identification was verified prior to start of the visit. A caregiver was present when appropriate.  Due to this being a TeleHealth encounter (During [facility] public health emergency), evaluation of the following organ systems was limited: Vitals/Constitutional/EENT/Resp/CV/GI//MS/Neuro/Skin/Heme-Lymph-Imm. Pursuant to the emergency declaration under the [facility], [facility] waiver authority and the [employer] Act, this Virtual  Visit was conducted, with patient's (and/or legal guardian's) consent, to reduce the patient's risk of exposure to COVID-19 and provide necessary medical care. Services were provided through a video synchronous discussion virtually to substitute for in-person clinic visit. Patient and provider were located at their individual homes.       Jacey Nava MD

## 2020-06-09 ENCOUNTER — TELEPHONE (OUTPATIENT)
Dept: FAMILY MEDICINE CLINIC | Age: 65
End: 2020-06-09

## 2020-06-09 NOTE — TELEPHONE ENCOUNTER
Pt called and stated that she has reach out to her arthritis doctor for and allergic reaction to medication(s)    The doctor advised her to call her PCP and to be in route to ER/ED.     Pt wanted to let you know that she is in route to [facility]

## 2020-06-16 DIAGNOSIS — E11.9 TYPE 2 DIABETES MELLITUS WITHOUT COMPLICATION, WITH LONG-TERM CURRENT USE OF INSULIN (HCC): ICD-10-CM

## 2020-06-16 DIAGNOSIS — Z79.4 TYPE 2 DIABETES MELLITUS WITHOUT COMPLICATION, WITH LONG-TERM CURRENT USE OF INSULIN (HCC): ICD-10-CM

## 2020-06-17 RX ORDER — DULAGLUTIDE 0.75 MG/.5ML
0.75 INJECTION, SOLUTION SUBCUTANEOUS
Qty: 3 PEN | Refills: 5 | Status: SHIPPED | OUTPATIENT
Start: 2020-06-17

## 2020-06-24 DIAGNOSIS — E11.21 TYPE 2 DIABETES WITH NEPHROPATHY (HCC): ICD-10-CM

## 2020-06-25 ENCOUNTER — TELEPHONE (OUTPATIENT)
Dept: FAMILY MEDICINE CLINIC | Age: 65
End: 2020-06-25

## 2020-06-25 NOTE — TELEPHONE ENCOUNTER
----- Message from [facility] sent at 6/24/2020  4:03 PM EDT -----  Regarding: Dr. Denice Phelan: [phone]  Caller's first and last name: Radha Payan, [address] [place] [address] required yes/no and why: yes  Best contact number(s): [phone] ext [phone]  Details to clarify the request: They need the most updated clinical notes for the patient faxed to [phone] with reference number [id number]. Please indicate the last appointment for diabetic care for the patient.

## 2020-06-28 RX ORDER — INSULIN GLARGINE 100 [IU]/ML
INJECTION, SOLUTION SUBCUTANEOUS
Qty: 9 PEN | Refills: 0 | Status: SHIPPED | OUTPATIENT
Start: 2020-06-28 | End: 2020-07-27

## 2020-06-30 ENCOUNTER — DOCUMENTATION ONLY (OUTPATIENT)
Dept: FAMILY MEDICINE CLINIC | Age: 65
End: 2020-06-30

## 2020-07-02 ENCOUNTER — VIRTUAL VISIT (OUTPATIENT)
Dept: FAMILY MEDICINE CLINIC | Age: 65
End: 2020-07-02

## 2020-07-02 ENCOUNTER — TELEPHONE (OUTPATIENT)
Dept: FAMILY MEDICINE CLINIC | Age: 65
End: 2020-07-02

## 2020-07-02 ENCOUNTER — HOSPITAL ENCOUNTER (OUTPATIENT)
Dept: LAB | Age: 65
Discharge: HOME | End: 2020-07-02

## 2020-07-02 DIAGNOSIS — E11.9 TYPE 2 DIABETES MELLITUS WITHOUT COMPLICATION, WITH LONG-TERM CURRENT USE OF INSULIN (HCC): ICD-10-CM

## 2020-07-02 DIAGNOSIS — R30.0 DYSURIA: Primary | ICD-10-CM

## 2020-07-02 DIAGNOSIS — R30.0 DYSURIA: ICD-10-CM

## 2020-07-02 DIAGNOSIS — Z79.4 TYPE 2 DIABETES MELLITUS WITHOUT COMPLICATION, WITH LONG-TERM CURRENT USE OF INSULIN (HCC): ICD-10-CM

## 2020-07-02 LAB
ALBUMIN SERPL-MCNC: 3.9 G/DL (ref 3.5–5)
ALBUMIN/GLOB SERPL: 1.3 {RATIO} (ref 1.1–2.2)
ALP SERPL-CCNC: 110 U/L (ref 45–117)
ALT SERPL-CCNC: 56 U/L (ref 12–78)
ANION GAP SERPL CALC-SCNC: 8 MMOL/L (ref 5–15)
AST SERPL-CCNC: 22 U/L (ref 15–37)
BILIRUB SERPL-MCNC: 0.5 MG/DL (ref 0.2–1)
BUN SERPL-MCNC: 24 MG/DL (ref 6–20)
BUN/CREAT SERPL: 30 (ref 12–20)
CALCIUM SERPL-MCNC: 9.1 MG/DL (ref 8.5–10.1)
CHLORIDE SERPL-SCNC: 103 MMOL/L (ref 97–108)
CO2 SERPL-SCNC: 24 MMOL/L (ref 21–32)
CREAT SERPL-MCNC: 0.8 MG/DL (ref 0.55–1.02)
CREAT UR-MCNC: 35.1 MG/DL
EST. AVERAGE GLUCOSE BLD GHB EST-MCNC: 166 MG/DL
GLOBULIN SER CALC-MCNC: 2.9 G/DL (ref 2–4)
GLUCOSE SERPL-MCNC: 142 MG/DL (ref 65–100)
HBA1C MFR BLD: 7.4 % (ref 4–5.6)
MICROALBUMIN UR-MCNC: <0.5 MG/DL
MICROALBUMIN/CREAT UR-RTO: NORMAL MG/G (ref 0–30)
POTASSIUM SERPL-SCNC: 4.3 MMOL/L (ref 3.5–5.1)
PROT SERPL-MCNC: 6.8 G/DL (ref 6.4–8.2)
SODIUM SERPL-SCNC: 135 MMOL/L (ref 136–145)

## 2020-07-02 RX ORDER — SULFAMETHOXAZOLE AND TRIMETHOPRIM 800; 160 MG/1; MG/1
1 TABLET ORAL 2 TIMES DAILY
Qty: 20 TAB | Refills: 0 | Status: SHIPPED | OUTPATIENT
Start: 2020-07-02 | End: 2020-07-12

## 2020-07-02 NOTE — PROGRESS NOTES
Kristi Poe is a 77 y.o. female who was seen by synchronous (real-time) audio-video technology on 7/2/2020 for Bladder Infection (History of bladder infection      No treatment)      She wants a CGM because it hurts to prick her finger. She was put on a CGM 3 years ago  She is taking insulin and checks blood sugars 4 times a day   she takes 2 insulin shots a day  She cannot tolerate 4 sticks a day and needs a CGM in order to comply with glucose monitoring which is vital to proper insulin care  Her blood sugars are frequently in the 60s fasting      The other reason for her call  Today is that she is having urinary complaints  She had a bladder stimulator removed recently  Her urine now is very dark and has an odor  She also says it burns when she urinates    She is having trouble with hip pain also . Started when the bladder stimulator removed    Assessment & Plan:   Diagnoses and all orders for this visit:    1. Dysuria  -     CULTURE, URINE; Future  -     trimethoprim-sulfamethoxazole (BACTRIM DS, SEPTRA DS) 160-800 mg per tablet; Take 1 Tab by mouth two (2) times a day for 10 days. 2. Type 2 diabetes mellitus without complication, with long-term current use of insulin ([facility])  -     METABOLIC PANEL, COMPREHENSIVE; Future  -     HEMOGLOBIN A1C WITH EAG; Future  -     MICROALBUMIN, UR, RAND W/ MICROALB/CREAT RATIO; Future    will complete form for CGM        Subjective:       Prior to Admission medications    Medication Sig Start Date End Date Taking? Authorizing Provider   trimethoprim-sulfamethoxazole (BACTRIM DS, SEPTRA DS) 160-800 mg per tablet Take 1 Tab by mouth two (2) times a day for 10 days. 7/2/20 7/12/20 Yes MD Leola Paul U-100 Insulin 100 unit/mL (3 mL) inpn INJECT 22 UNITS UNDER THE SKIN ONCE DAILY AFTER DINNER 6/28/20  Yes Reid Sesay MD   dulaglutide (Trulicity) 0.11 DA/1.4 mL sub-q pen 0.5 mL by SubCUTAneous route every seven (7) days.  6/17/20  Yes Reid Sesay MD tiZANidine (ZANAFLEX) 4 mg tablet Take 1 Tab by mouth three (3) times daily. 4/27/20  Yes Lloyd De La O MD   acetaminophen (TYLENOL) 500 mg tablet Take 1,000 mg by mouth every six (6) hours as needed for Pain. Yes Other, MD Darell   predniSONE (DELTASONE) 20 mg tablet 2 qd for 4 d. 1 1/2 qd for 4 d. 1 qd for 4 d, 1/2 qd for 4 d. 4/26/20  Yes Catrachito Perez MD   levothyroxine (SYNTHROID) 175 mcg tablet TAKE 1 TABLET BY MOUTH DAILY BEFORE BREAKFAST 4/7/20  Yes Lloyd De La O MD   omeprazole (PRILOSEC) 20 mg capsule Take 20 mg by mouth daily. Yes Other, MD Darell   busPIRone (BUSPAR) 10 mg tablet 10 mg = 1 tab each dose, PO, tid, # 270 tab, 0 Refills 3/2/20  Yes Provider, Historical   Jardiance 10 mg tablet  2/24/20  Yes Provider, Historical   metoprolol tartrate (LOPRESSOR) 25 mg tablet Take 50 mg by mouth two (2) times a day. 2/13/20  Yes Provider, Historical   potassium chloride SA (MICRO-K) 10 mEq capsule Take 20 mEq by mouth two (2) times a day. Yes Provider, Historical   omega-3s/dha/epa/fish oil/D3 (VITAMIN-D + OMEGA-3 PO) Take  by mouth. Yes Provider, Historical   diclofenac (VOLTAREN) 1 % gel Apply  to affected area four (4) times daily. Yes Provider, Historical   lisinopriL (PRINIVIL, ZESTRIL) 2.5 mg tablet Take  by mouth daily. Yes Provider, Historical   gabapentin (NEURONTIN) 600 mg tablet Take 1/2 tab at bedtime x 1 wk; then 1 tab at bedtime x 1 wk; then 1 1/2 tab at bedtime; then 2 tab at bedtime 3/13/20  Yes Guanaco Duncan MD   chlorhexidine (PERIDEX) 0.12 % solution  9/4/19  Yes Provider, Historical   LIDOCAINE VISCOUS 2 % solution  9/4/19  Yes Provider, Historical   albuterol (PROVENTIL HFA, VENTOLIN HFA, PROAIR HFA) 90 mcg/actuation inhaler Take 2 Puffs by inhalation every four (4) hours as needed for Shortness of Breath or Wheezing. Order states ever 4-6 hours 7/29/19  Yes Lloyd De La O MD   buPROPion XL (WELLBUTRIN XL) 300 mg XL tablet Take 300 mg by mouth every morning.    Yes Provider, Historical   rivaroxaban (XARELTO) 20 mg tab tablet Take 20 mg by mouth daily. Yes Provider, Historical   rOPINIRole (REQUIP) 1 mg tablet Take 2 mg by mouth nightly. Yes Provider, Historical   traZODone (DESYREL) 150 mg tablet Take 100-200 mg by mouth nightly. Yes Provider, Historical   ondansetron (ZOFRAN ODT) 4 mg disintegrating tablet Take 1 Tab by mouth every eight (8) hours as needed for Nausea. 7/18/18  Yes Michael Hardin MD   DULoxetine (CYMBALTA) 60 mg capsule Take 2 Caps by mouth daily. 7/18/18  Yes Michael Hardin MD   rosuvastatin (CRESTOR) 40 mg tablet Take 20 mg by mouth nightly. Yes Provider, Historical   montelukast (SINGULAIR) 10 mg tablet Take 10 mg by mouth daily. Yes Provider, Historical   Insulin Needles, Disposable, (BD ULTRA-FINE MINI PEN NEEDLE) 31 gauge x 3/16\" ndle Use daily as directed 11/18/19   Ryne Almaraz MD     Patient Active Problem List    Diagnosis Date Noted    Acquired hypothyroidism 09/25/2018    Type 2 diabetes with nephropathy ([facility] 07/27/2018    Severe obesity (BMI 35.0-39.9) 07/26/2018    Asthma 06/30/2018    Diabetes ([facility]     Factor V Leiden ([facility]     [facility]     Supraorbital headache 10/07/2015    Isthmica nodosa salpingitis 07/12/2013    Anxiety 07/12/2013    Essential and other specified forms of tremor 07/12/2013     Current Outpatient Medications   Medication Sig Dispense Refill    trimethoprim-sulfamethoxazole (BACTRIM DS, SEPTRA DS) 160-800 mg per tablet Take 1 Tab by mouth two (2) times a day for 10 days. 20 Tab 0    Lantus Solostar U-100 Insulin 100 unit/mL (3 mL) inpn INJECT 22 UNITS UNDER THE SKIN ONCE DAILY AFTER DINNER 9 Pen 0    dulaglutide (Trulicity) 3.53 JW/6.2 mL sub-q pen 0.5 mL by SubCUTAneous route every seven (7) days. 3 Pen 5    tiZANidine (ZANAFLEX) 4 mg tablet Take 1 Tab by mouth three (3) times daily.  90 Tab 1    acetaminophen (TYLENOL) 500 mg tablet Take 1,000 mg by mouth every six (6) hours as needed for Pain.  predniSONE (DELTASONE) 20 mg tablet 2 qd for 4 d. 1 1/2 qd for 4 d. 1 qd for 4 d, 1/2 qd for 4 d. 20 Tab 0    levothyroxine (SYNTHROID) 175 mcg tablet TAKE 1 TABLET BY MOUTH DAILY BEFORE BREAKFAST 90 Tab 1    omeprazole (PRILOSEC) 20 mg capsule Take 20 mg by mouth daily.  busPIRone (BUSPAR) 10 mg tablet 10 mg = 1 tab each dose, PO, tid, # 270 tab, 0 Refills      Jardiance 10 mg tablet       metoprolol tartrate (LOPRESSOR) 25 mg tablet Take 50 mg by mouth two (2) times a day.  potassium chloride SA (MICRO-K) 10 mEq capsule Take 20 mEq by mouth two (2) times a day.  omega-3s/dha/epa/fish oil/D3 (VITAMIN-D + OMEGA-3 PO) Take  by mouth.  diclofenac (VOLTAREN) 1 % gel Apply  to affected area four (4) times daily.  lisinopriL (PRINIVIL, ZESTRIL) 2.5 mg tablet Take  by mouth daily.  gabapentin (NEURONTIN) 600 mg tablet Take 1/2 tab at bedtime x 1 wk; then 1 tab at bedtime x 1 wk; then 1 1/2 tab at bedtime; then 2 tab at bedtime 60 Tab 1    chlorhexidine (PERIDEX) 0.12 % solution       LIDOCAINE VISCOUS 2 % solution       albuterol (PROVENTIL HFA, VENTOLIN HFA, PROAIR HFA) 90 mcg/actuation inhaler Take 2 Puffs by inhalation every four (4) hours as needed for Shortness of Breath or Wheezing. Order states ever 4-6 hours 1 Inhaler 5    buPROPion XL (WELLBUTRIN XL) 300 mg XL tablet Take 300 mg by mouth every morning.  rivaroxaban (XARELTO) 20 mg tab tablet Take 20 mg by mouth daily.  rOPINIRole (REQUIP) 1 mg tablet Take 2 mg by mouth nightly.  traZODone (DESYREL) 150 mg tablet Take 100-200 mg by mouth nightly.  ondansetron (ZOFRAN ODT) 4 mg disintegrating tablet Take 1 Tab by mouth every eight (8) hours as needed for Nausea.  DULoxetine (CYMBALTA) 60 mg capsule Take 2 Caps by mouth daily.  rosuvastatin (CRESTOR) 40 mg tablet Take 20 mg by mouth nightly.  montelukast (SINGULAIR) 10 mg tablet Take 10 mg by mouth daily.       Insulin Needles, Disposable, (BD ULTRA-FINE MINI PEN NEEDLE) 31 gauge x 3/16\" ndle Use daily as directed 100 Pen Needle 5       ROS    Objective:   No flowsheet data found. [INSTRUCTIONS:  \"[x]\" Indicates a positive item  \"[]\" Indicates a negative item  -- DELETE ALL ITEMS NOT EXAMINED]    Constitutional: [x] Appears well-developed and well-nourished [x] No apparent distress      [] Abnormal -     Mental status: [x] Alert and awake  [x] Oriented to person/place/time [x] Able to follow commands    [] Abnormal -     Eyes:   EOM    [x]  Normal    [] Abnormal -   Sclera  [x]  Normal    [] Abnormal -          Discharge [x]  None visible   [] Abnormal -     HENT: [x] Normocephalic, atraumatic  [] Abnormal -   [x] Mouth/Throat: Mucous membranes are moist    External Ears [x] Normal  [] Abnormal -    Neck: [x] No visualized mass [] Abnormal -     Pulmonary/Chest: [x] Respiratory effort normal   [x] No visualized signs of difficulty breathing or respiratory distress        [] Abnormal -      Musculoskeletal:   [x] Normal gait with no signs of ataxia         [x] Normal range of motion of neck        [] Abnormal -     Neurological:        [x] No Facial Asymmetry (Cranial nerve 7 motor function) (limited exam due to video visit)          [x] No gaze palsy        [] Abnormal -          Skin:        [x] No significant exanthematous lesions or discoloration noted on facial skin         [] Abnormal -            Psychiatric:       [x] Normal Affect [] Abnormal -        [x] No Hallucinations    Other pertinent observable physical exam findings:-        We discussed the expected course, resolution and complications of the diagnosis(es) in detail. Medication risks, benefits, costs, interactions, and alternatives were discussed as indicated. I advised her to contact the office if her condition worsens, changes or fails to improve as anticipated. She expressed understanding with the diagnosis(es) and plan.        Esperanza Romo, who was evaluated through a patient-initiated, synchronous (real-time) audio-video encounter, and/or her healthcare decision maker, is aware that it is a billable service, with coverage as determined by her insurance carrier. She provided verbal consent to proceed: Yes, and patient identification was verified. It was conducted pursuant to the emergency declaration under the [facility], [address] [place] [address] and the [employer]. A caregiver was present when appropriate. Ability to conduct physical exam was limited. I was at home. The patient was at home.       Vianca Lenz MD  Patient stated name &   Chief Complaint   Patient presents with    Bladder Infection     History of bladder infection      No treatment        Health Maintenance Due   Topic    Eye Exam Retinal or Dilated     Shingrix Vaccine Age 50> (2 of 2)       Wt Readings from Last 3 Encounters:   20 226 lb 3.1 oz (102.6 kg)   20 223 lb 12.3 oz (101.5 kg)   20 223 lb (101.2 kg)     Temp Readings from Last 3 Encounters:   20 98.5 °F (36.9 °C)   20 97.3 °F (36.3 °C)   20 98.2 °F (36.8 °C)     BP Readings from Last 3 Encounters:   20 (!) 157/92   20 134/80   20 131/65     Pulse Readings from Last 3 Encounters:   20 94   20 82   20 (!) 109         Learning Assessment:  :     Learning Assessment 2018 2018 5/3/2018 2015   PRIMARY LEARNER Patient Patient Patient Patient   HIGHEST LEVEL OF EDUCATION - PRIMARY LEARNER  - - GRADUATED HIGH SCHOOL OR GED -   BARRIERS PRIMARY LEARNER - - NONE -   CO-LEARNER CAREGIVER - - No -   PRIMARY LANGUAGE ENGLISH ENGLISH ENGLISH ENGLISH   LEARNER PREFERENCE PRIMARY DEMONSTRATION DEMONSTRATION DEMONSTRATION READING   ANSWERED BY patient patient patient patient   RELATIONSHIP SELF SELF SELF SELF       Depression Screening:  :     3 most recent PHQ Screens 12/13/2019   PHQ Not Done Active Diagnosis of Depression or Bipolar Disorder   Little interest or pleasure in doing things -   Feeling down, depressed, irritable, or hopeless -   Total Score PHQ 2 -   Trouble falling or staying asleep, or sleeping too much -   Feeling tired or having little energy -   Poor appetite, weight loss, or overeating -   Feeling bad about yourself - or that you are a failure or have let yourself or your family down -   Trouble concentrating on things such as school, work, reading, or watching TV -   Moving or speaking so slowly that other people could have noticed; or the opposite being so fidgety that others notice -   Thoughts of being better off dead, or hurting yourself in some way -   PHQ 9 Score -   How difficult have these problems made it for you to do your work, take care of your home and get along with others -       Fall Risk Assessment:  :     Fall Risk Assessment, last 12 mths 1/31/2020   Able to walk? Yes   Fall in past 12 months? No   Fall with injury? -   Number of falls in past 12 months -   Fall Risk Score -       Abuse Screening:  :     No flowsheet data found. Coordination of Care Questionnaire:  :     1) Have you been to an emergency room, urgent care clinic since your last visit? No    Hospitalized since your last visit? No             2) Have you seen or consulted any other health care providers outside of [address] [place] [address] since your last visit? No  (Include any pap smears or colon screenings in this section.)    Patient is accompanied by VV I have received verbal consent from Esperanza Romo to discuss any/all medical information while they are present in the room.

## 2020-07-02 NOTE — TELEPHONE ENCOUNTER
[address] [facility] called stating pt was prescribed bactrum and is currently on a potassium supplement.  They want to know if the Dr would like to override the drug interaction since the bactrum can increase potassium levels    [facility] phone: [phone]

## 2020-07-03 LAB
BACTERIA SPEC CULT: NORMAL
SERVICE CMNT-IMP: NORMAL

## 2020-07-06 ENCOUNTER — TELEPHONE (OUTPATIENT)
Dept: FAMILY MEDICINE CLINIC | Age: 65
End: 2020-07-06

## 2020-07-06 ENCOUNTER — DOCUMENTATION ONLY (OUTPATIENT)
Dept: FAMILY MEDICINE CLINIC | Age: 65
End: 2020-07-06

## 2020-07-06 NOTE — TELEPHONE ENCOUNTER
Spoke with pt verified id [id number]. Gave pt lab results with ref range. Pt requesting for any further evaluation.

## 2020-07-08 NOTE — PROGRESS NOTES
The blood sugar control is ok but not as good as it was 6 months ago  The sodium is slightly low,  Add a little bit of salt back in your daily diet. We will wait to recheck in 3 months.  Take a hard look at your diet to make sure that you reduce the sugar intake

## 2020-07-09 ENCOUNTER — TELEPHONE (OUTPATIENT)
Dept: FAMILY MEDICINE CLINIC | Age: 65
End: 2020-07-09

## 2020-07-09 NOTE — TELEPHONE ENCOUNTER
----- Message from Yuly Montesinos MD sent at 7/7/2020 10:03 PM EDT -----  The blood sugar control is ok but not as good as it was 6 months ago  The sodium is slightly low,  Add a little bit of salt back in your daily diet. We will wait to recheck in 3 months.  Take a hard look at your diet to make sure that you reduce the sugar intake

## 2020-07-09 NOTE — TELEPHONE ENCOUNTER
Two patient Identification confirmed. I spoke with the patient about Concerning taking bactrim along with potassium supplement     Stop the potassium supplement while taking the bactrim   Avoid fruit during this time and avoid fruit juices   Do not drink things like gatoraid   Stay well hydrated with plain water at least 64 ounce each day   The recent potassium level was not close to too high   I think it will be fine to stop the supplement for 10 days and then restart   DB   Patient verbalized understanding. Patient states not taking Bactrim. Patient states has not pick Bactrim up from pharmacy.

## 2020-07-09 NOTE — TELEPHONE ENCOUNTER
Two patient Identification confirmed. I spoke with the patient about The blood sugar control is ok but not as good as it was 6 months ago   The sodium is slightly low,  Add a little bit of salt back in your daily diet. We will wait to recheck in 3 months. Take a hard look at your diet to make sure that you reduce the sugar intake   Patient verbalized understanding.

## 2020-07-16 ENCOUNTER — TELEPHONE (OUTPATIENT)
Dept: FAMILY MEDICINE CLINIC | Age: 65
End: 2020-07-16

## 2020-07-16 NOTE — TELEPHONE ENCOUNTER
FYI    Two patient Identification confirmed. Patient states while walking around this morning in Living room, her right hip caught on chair. Patient states fell into chair and is experiencing pain only with movement 8 out of 10. Patient states having some difficulty walking.    Patient accepts appointment 7/16/2020

## 2020-07-24 DIAGNOSIS — E11.21 TYPE 2 DIABETES WITH NEPHROPATHY (HCC): ICD-10-CM

## 2020-07-27 RX ORDER — INSULIN GLARGINE 100 [IU]/ML
INJECTION, SOLUTION SUBCUTANEOUS
Qty: 3 ADJUSTABLE DOSE PRE-FILLED PEN SYRINGE | Refills: 1 | Status: SHIPPED | OUTPATIENT
Start: 2020-07-27 | End: 2020-09-24

## 2020-08-13 ENCOUNTER — TELEPHONE (OUTPATIENT)
Dept: FAMILY MEDICINE CLINIC | Age: 65
End: 2020-08-13

## 2020-08-13 NOTE — TELEPHONE ENCOUNTER
Pt called with an extreme headache and feeling like she is going to pass out. She said her  is not there to take her to the ER and she was in tears.  Transferred to nurse

## 2020-08-13 NOTE — TELEPHONE ENCOUNTER
Called the squad to go to Mrs. Hamilton's home. Experienceing severe headache, slurred speech, can't get out of bed. Started 30 mins ago.  not at home. Squad on their way.

## 2020-08-31 ENCOUNTER — TELEPHONE (OUTPATIENT)
Dept: FAMILY MEDICINE CLINIC | Age: 65
End: 2020-08-31

## 2020-08-31 ENCOUNTER — VIRTUAL VISIT (OUTPATIENT)
Dept: FAMILY MEDICINE CLINIC | Age: 65
End: 2020-08-31
Payer: MEDICARE

## 2020-08-31 DIAGNOSIS — R51.9 SUPRAORBITAL HEADACHE: Primary | ICD-10-CM

## 2020-08-31 PROCEDURE — 1090F PRES/ABSN URINE INCON ASSESS: CPT | Performed by: FAMILY MEDICINE

## 2020-08-31 PROCEDURE — G8428 CUR MEDS NOT DOCUMENT: HCPCS | Performed by: FAMILY MEDICINE

## 2020-08-31 PROCEDURE — G8536 NO DOC ELDER MAL SCRN: HCPCS | Performed by: FAMILY MEDICINE

## 2020-08-31 PROCEDURE — G8417 CALC BMI ABV UP PARAM F/U: HCPCS | Performed by: FAMILY MEDICINE

## 2020-08-31 PROCEDURE — 1101F PT FALLS ASSESS-DOCD LE1/YR: CPT | Performed by: FAMILY MEDICINE

## 2020-08-31 PROCEDURE — G8432 DEP SCR NOT DOC, RNG: HCPCS | Performed by: FAMILY MEDICINE

## 2020-08-31 PROCEDURE — G9899 SCRN MAM PERF RSLTS DOC: HCPCS | Performed by: FAMILY MEDICINE

## 2020-08-31 PROCEDURE — 99213 OFFICE O/P EST LOW 20 MIN: CPT | Performed by: FAMILY MEDICINE

## 2020-08-31 PROCEDURE — G8399 PT W/DXA RESULTS DOCUMENT: HCPCS | Performed by: FAMILY MEDICINE

## 2020-08-31 PROCEDURE — 3017F COLORECTAL CA SCREEN DOC REV: CPT | Performed by: FAMILY MEDICINE

## 2020-08-31 NOTE — PROGRESS NOTES
Ted Jackson is a 77 y.o. female who was seen by synchronous (real-time) audio-video technology on 8/31/2020 for No chief complaint on file. Was discharged aug 15 from [address] [place] [address] for  Headache diagnosed as  Migraine, ruled out for CVA  She says she was not referred to any neurologist at discharge which is unusual  I am choosing one for her at [address] [place] [address]:   Diagnoses and all orders for this visit:    1. Supraorbital headache  -     [facility] neuro  She is also looking to change her rheumatologist   we discussed DR Feliciano Quiñonez in [facility]  I think it would be better is she stayed at [address] [place] [address] rheum  She is a patient wth many things to address. It would be better if they stayed in one place and since se has been with that group a long time it may help avoid anything slippig through the cracks if she raymundo there          Subjective:       Prior to Admission medications    Medication Sig Start Date End Date Taking? Authorizing Provider   Lantus Solostar U-100 Insulin 100 unit/mL (3 mL) inpn INJECT 22 UNITS UNDER THE SKIN DAILY AFTER DINNER 7/27/20   Dede Muir MD   dulaglutide (Trulicity) 4.79 NR/2.6 mL sub-q pen 0.5 mL by SubCUTAneous route every seven (7) days. 6/17/20   Dede Muir MD   tiZANidine (ZANAFLEX) 4 mg tablet Take 1 Tab by mouth three (3) times daily. 4/27/20   Dede Muir MD   acetaminophen (TYLENOL) 500 mg tablet Take 1,000 mg by mouth every six (6) hours as needed for Pain. Rae, MD Darell   predniSONE (DELTASONE) 20 mg tablet 2 qd for 4 d. 1 1/2 qd for 4 d. 1 qd for 4 d, 1/2 qd for 4 d. 4/26/20   Jose Armando Harper MD   levothyroxine (SYNTHROID) 175 mcg tablet TAKE 1 TABLET BY MOUTH DAILY BEFORE BREAKFAST 4/7/20   Dede Muir MD   omeprazole (PRILOSEC) 20 mg capsule Take 20 mg by mouth daily.     Darell Mcbride MD   busPIRone (BUSPAR) 10 mg tablet 10 mg = 1 tab each dose, PO, tid, # 270 tab, 0 Refills 3/2/20   Provider, Historical   Jardiance 10 mg tablet  2/24/20   Provider, Historical   metoprolol tartrate (LOPRESSOR) 25 mg tablet Take 50 mg by mouth two (2) times a day. 2/13/20   Provider, Historical   potassium chloride SA (MICRO-K) 10 mEq capsule Take 20 mEq by mouth two (2) times a day. Provider, Historical   omega-3s/dha/epa/fish oil/D3 (VITAMIN-D + OMEGA-3 PO) Take  by mouth. Provider, Historical   diclofenac (VOLTAREN) 1 % gel Apply  to affected area four (4) times daily. Provider, Historical   lisinopriL (PRINIVIL, ZESTRIL) 2.5 mg tablet Take  by mouth daily. Provider, Historical   gabapentin (NEURONTIN) 600 mg tablet Take 1/2 tab at bedtime x 1 wk; then 1 tab at bedtime x 1 wk; then 1 1/2 tab at bedtime; then 2 tab at bedtime 3/13/20   Monet Gonzalez MD   Insulin Needles, Disposable, (BD ULTRA-FINE MINI PEN NEEDLE) 31 gauge x 3/16\" ndle Use daily as directed 11/18/19   Mikaela Dickerson MD   chlorhexidine (PERIDEX) 0.12 % solution  9/4/19   Provider, Historical   LIDOCAINE VISCOUS 2 % solution  9/4/19   Provider, Historical   albuterol (PROVENTIL HFA, VENTOLIN HFA, PROAIR HFA) 90 mcg/actuation inhaler Take 2 Puffs by inhalation every four (4) hours as needed for Shortness of Breath or Wheezing. Order states ever 4-6 hours 7/29/19   Mikaela Dickerson MD   buPROPion XL (WELLBUTRIN XL) 300 mg XL tablet Take 300 mg by mouth every morning. Provider, Historical   rivaroxaban (XARELTO) 20 mg tab tablet Take 20 mg by mouth daily. Provider, Historical   rOPINIRole (REQUIP) 1 mg tablet Take 2 mg by mouth nightly. Provider, Historical   traZODone (DESYREL) 150 mg tablet Take 100-200 mg by mouth nightly. Provider, Historical   ondansetron (ZOFRAN ODT) 4 mg disintegrating tablet Take 1 Tab by mouth every eight (8) hours as needed for Nausea. 7/18/18   Jacquelyn Hardin MD   DULoxetine (CYMBALTA) 60 mg capsule Take 2 Caps by mouth daily.  7/18/18   Jacquelyn Hardin MD   rosuvastatin (CRESTOR) 40 mg tablet Take 20 mg by mouth nightly. Provider, Historical   montelukast (SINGULAIR) 10 mg tablet Take 10 mg by mouth daily. Provider, Historical     Patient Active Problem List    Diagnosis Date Noted    Acquired hypothyroidism 09/25/2018    Type 2 diabetes with nephropathy ([facility] 07/27/2018    Severe obesity (BMI 35.0-39.9) 07/26/2018    Asthma 06/30/2018    Diabetes ([facility]     Factor V Leiden ([facility]     A-fib ([facility]     Supraorbital headache 10/07/2015    Isthmica nodosa salpingitis 07/12/2013    Anxiety 07/12/2013    Essential and other specified forms of tremor 07/12/2013     Current Outpatient Medications   Medication Sig Dispense Refill    Lantus Solostar U-100 Insulin 100 unit/mL (3 mL) inpn INJECT 22 UNITS UNDER THE SKIN DAILY AFTER DINNER 3 Adjustable Dose Pre-filled Pen Syringe 1    dulaglutide (Trulicity) 3.09 FP/7.4 mL sub-q pen 0.5 mL by SubCUTAneous route every seven (7) days. 3 Pen 5    tiZANidine (ZANAFLEX) 4 mg tablet Take 1 Tab by mouth three (3) times daily. 90 Tab 1    acetaminophen (TYLENOL) 500 mg tablet Take 1,000 mg by mouth every six (6) hours as needed for Pain.  predniSONE (DELTASONE) 20 mg tablet 2 qd for 4 d. 1 1/2 qd for 4 d. 1 qd for 4 d, 1/2 qd for 4 d. 20 Tab 0    levothyroxine (SYNTHROID) 175 mcg tablet TAKE 1 TABLET BY MOUTH DAILY BEFORE BREAKFAST 90 Tab 1    omeprazole (PRILOSEC) 20 mg capsule Take 20 mg by mouth daily.  busPIRone (BUSPAR) 10 mg tablet 10 mg = 1 tab each dose, PO, tid, # 270 tab, 0 Refills      Jardiance 10 mg tablet       metoprolol tartrate (LOPRESSOR) 25 mg tablet Take 50 mg by mouth two (2) times a day.  potassium chloride SA (MICRO-K) 10 mEq capsule Take 20 mEq by mouth two (2) times a day.  omega-3s/dha/epa/fish oil/D3 (VITAMIN-D + OMEGA-3 PO) Take  by mouth.  diclofenac (VOLTAREN) 1 % gel Apply  to affected area four (4) times daily.  lisinopriL (PRINIVIL, ZESTRIL) 2.5 mg tablet Take  by mouth daily.       gabapentin (NEURONTIN) 600 mg tablet Take 1/2 tab at bedtime x 1 wk; then 1 tab at bedtime x 1 wk; then 1 1/2 tab at bedtime; then 2 tab at bedtime 60 Tab 1    Insulin Needles, Disposable, (BD ULTRA-FINE MINI PEN NEEDLE) 31 gauge x 3/16\" ndle Use daily as directed 100 Pen Needle 5    chlorhexidine (PERIDEX) 0.12 % solution       LIDOCAINE VISCOUS 2 % solution       albuterol (PROVENTIL HFA, VENTOLIN HFA, PROAIR HFA) 90 mcg/actuation inhaler Take 2 Puffs by inhalation every four (4) hours as needed for Shortness of Breath or Wheezing. Order states ever 4-6 hours 1 Inhaler 5    buPROPion XL (WELLBUTRIN XL) 300 mg XL tablet Take 300 mg by mouth every morning.  rivaroxaban (XARELTO) 20 mg tab tablet Take 20 mg by mouth daily.  rOPINIRole (REQUIP) 1 mg tablet Take 2 mg by mouth nightly.  traZODone (DESYREL) 150 mg tablet Take 100-200 mg by mouth nightly.  ondansetron (ZOFRAN ODT) 4 mg disintegrating tablet Take 1 Tab by mouth every eight (8) hours as needed for Nausea.  DULoxetine (CYMBALTA) 60 mg capsule Take 2 Caps by mouth daily.  rosuvastatin (CRESTOR) 40 mg tablet Take 20 mg by mouth nightly.  montelukast (SINGULAIR) 10 mg tablet Take 10 mg by mouth daily. ROS    Objective:   No flowsheet data found.      [INSTRUCTIONS:  \"[x]\" Indicates a positive item  \"[]\" Indicates a negative item  -- DELETE ALL ITEMS NOT EXAMINED]    Constitutional: [x] Appears well-developed and well-nourished [x] No apparent distress      [] Abnormal -     Mental status: [x] Alert and awake  [x] Oriented to person/place/time [x] Able to follow commands    [] Abnormal -     Eyes:   EOM    [x]  Normal    [] Abnormal -   Sclera  [x]  Normal    [] Abnormal -          Discharge [x]  None visible   [] Abnormal -     HENT: [x] Normocephalic, atraumatic  [] Abnormal -   [x] Mouth/Throat: Mucous membranes are moist    External Ears [x] Normal  [] Abnormal -    Neck: [x] No visualized mass [] Abnormal -     Pulmonary/Chest: [x] Respiratory effort normal   [x] No visualized signs of difficulty breathing or respiratory distress        [] Abnormal -      Musculoskeletal:   [x] Normal gait with no signs of ataxia         [x] Normal range of motion of neck        [] Abnormal -     Neurological:        [x] No Facial Asymmetry (Cranial nerve 7 motor function) (limited exam due to video visit)          [x] No gaze palsy        [] Abnormal -          Skin:        [x] No significant exanthematous lesions or discoloration noted on facial skin         [] Abnormal -            Psychiatric:       [x] Normal Affect [] Abnormal -        [x] No Hallucinations    Other pertinent observable physical exam findings:-        We discussed the expected course, resolution and complications of the diagnosis(es) in detail. Medication risks, benefits, costs, interactions, and alternatives were discussed as indicated. I advised her to contact the office if her condition worsens, changes or fails to improve as anticipated. She expressed understanding with the diagnosis(es) and plan. Karine Cheek, who was evaluated through a patient-initiated, synchronous (real-time) audio-video encounter, and/or her healthcare decision maker, is aware that it is a billable service, with coverage as determined by her insurance carrier. She provided verbal consent to proceed: Yes, and patient identification was verified. It was conducted pursuant to the emergency declaration under the [address] [place] [address] authority and the [employer] General Act. A caregiver was present when appropriate. Ability to conduct physical exam was limited. I was at home. The patient was at home.       Nara Conde MD

## 2020-08-31 NOTE — TELEPHONE ENCOUNTER
Patient called stating the Dr Alexia Moss needs to call [employer] to get approval for [facility]

## 2020-09-02 ENCOUNTER — TELEPHONE (OUTPATIENT)
Dept: FAMILY MEDICINE CLINIC | Age: 65
End: 2020-09-02

## 2020-09-02 NOTE — TELEPHONE ENCOUNTER
Place PA for [facility] Sensor System on Dr. Waldemar Caceres desk.

## 2020-09-04 ENCOUNTER — TELEPHONE (OUTPATIENT)
Dept: FAMILY MEDICINE CLINIC | Age: 65
End: 2020-09-04

## 2020-09-04 NOTE — TELEPHONE ENCOUNTER
Hi Dr. Kanwal Quiñonez,  I spoke to Ms. Hamilton to let her know that Sonia Courtney didn't do the prior auth for her [employer]. She stated she had called Medicare instead of [facility] and they would cover it. They need a script and dx code and reason. I can fax it to Medicare.   tamanna

## 2020-09-08 DIAGNOSIS — E11.8 TYPE 2 DIABETES MELLITUS WITH COMPLICATION, WITH LONG-TERM CURRENT USE OF INSULIN (HCC): Primary | ICD-10-CM

## 2020-09-08 DIAGNOSIS — Z79.4 TYPE 2 DIABETES MELLITUS WITHOUT COMPLICATION, WITH LONG-TERM CURRENT USE OF INSULIN (HCC): ICD-10-CM

## 2020-09-08 DIAGNOSIS — Z79.4 TYPE 2 DIABETES MELLITUS WITH COMPLICATION, WITH LONG-TERM CURRENT USE OF INSULIN (HCC): Primary | ICD-10-CM

## 2020-09-08 DIAGNOSIS — E11.9 TYPE 2 DIABETES MELLITUS WITHOUT COMPLICATION, WITH LONG-TERM CURRENT USE OF INSULIN (HCC): ICD-10-CM

## 2020-09-08 RX ORDER — FLASH GLUCOSE SENSOR
KIT MISCELLANEOUS
Qty: 2 KIT | Refills: 5 | Status: SHIPPED | OUTPATIENT
Start: 2020-09-08 | End: 2020-10-08 | Stop reason: SDUPTHER

## 2020-09-08 RX ORDER — FLASH GLUCOSE SCANNING READER
EACH MISCELLANEOUS
Qty: 1 EACH | Refills: 0 | Status: SHIPPED | OUTPATIENT
Start: 2020-09-08 | End: 2020-10-08 | Stop reason: SDUPTHER

## 2020-09-10 ENCOUNTER — TELEPHONE (OUTPATIENT)
Dept: FAMILY MEDICINE CLINIC | Age: 65
End: 2020-09-10

## 2020-09-10 NOTE — TELEPHONE ENCOUNTER
Scripts for Flash Glucose Sensor Kit  & Flash Glucose Scanning [place]  To be faxed by  here. To [phone] [phone]  We need a fax number.

## 2020-09-21 ENCOUNTER — TELEPHONE (OUTPATIENT)
Dept: FAMILY MEDICINE CLINIC | Age: 65
End: 2020-09-21

## 2020-09-21 NOTE — TELEPHONE ENCOUNTER
Two patient Identification confirmed. Patient states seen at [facility] for chest pain, abdomen pain, sore throat, and headache. Patient reports bp 149/79 today. Patient states she is not feeling well.

## 2020-09-22 DIAGNOSIS — E11.21 TYPE 2 DIABETES WITH NEPHROPATHY (HCC): ICD-10-CM

## 2020-09-24 RX ORDER — INSULIN GLARGINE 100 [IU]/ML
INJECTION, SOLUTION SUBCUTANEOUS
Qty: 3 ADJUSTABLE DOSE PRE-FILLED PEN SYRINGE | Refills: 3 | Status: SHIPPED | OUTPATIENT
Start: 2020-09-24 | End: 2021-10-18 | Stop reason: SDUPTHER

## 2020-09-24 RX ORDER — LEVOTHYROXINE SODIUM 175 UG/1
TABLET ORAL
Qty: 90 TAB | Refills: 0 | Status: SHIPPED | OUTPATIENT
Start: 2020-09-24 | End: 2020-12-23

## 2020-10-08 ENCOUNTER — VIRTUAL VISIT (OUTPATIENT)
Dept: FAMILY MEDICINE CLINIC | Age: 65
End: 2020-10-08
Payer: MEDICARE

## 2020-10-08 DIAGNOSIS — Z79.4 TYPE 2 DIABETES MELLITUS WITH COMPLICATION, WITH LONG-TERM CURRENT USE OF INSULIN (HCC): ICD-10-CM

## 2020-10-08 DIAGNOSIS — N39.0 URINARY TRACT INFECTION WITHOUT HEMATURIA, SITE UNSPECIFIED: Primary | ICD-10-CM

## 2020-10-08 DIAGNOSIS — E11.8 TYPE 2 DIABETES MELLITUS WITH COMPLICATION, WITH LONG-TERM CURRENT USE OF INSULIN (HCC): ICD-10-CM

## 2020-10-08 PROCEDURE — 99213 OFFICE O/P EST LOW 20 MIN: CPT | Performed by: FAMILY MEDICINE

## 2020-10-08 PROCEDURE — 2022F DILAT RTA XM EVC RTNOPTHY: CPT | Performed by: FAMILY MEDICINE

## 2020-10-08 PROCEDURE — G8399 PT W/DXA RESULTS DOCUMENT: HCPCS | Performed by: FAMILY MEDICINE

## 2020-10-08 PROCEDURE — G9899 SCRN MAM PERF RSLTS DOC: HCPCS | Performed by: FAMILY MEDICINE

## 2020-10-08 PROCEDURE — G8536 NO DOC ELDER MAL SCRN: HCPCS | Performed by: FAMILY MEDICINE

## 2020-10-08 PROCEDURE — 1090F PRES/ABSN URINE INCON ASSESS: CPT | Performed by: FAMILY MEDICINE

## 2020-10-08 PROCEDURE — 1101F PT FALLS ASSESS-DOCD LE1/YR: CPT | Performed by: FAMILY MEDICINE

## 2020-10-08 PROCEDURE — G8417 CALC BMI ABV UP PARAM F/U: HCPCS | Performed by: FAMILY MEDICINE

## 2020-10-08 PROCEDURE — 3017F COLORECTAL CA SCREEN DOC REV: CPT | Performed by: FAMILY MEDICINE

## 2020-10-08 PROCEDURE — G8510 SCR DEP NEG, NO PLAN REQD: HCPCS | Performed by: FAMILY MEDICINE

## 2020-10-08 PROCEDURE — G8428 CUR MEDS NOT DOCUMENT: HCPCS | Performed by: FAMILY MEDICINE

## 2020-10-08 RX ORDER — DIVALPROEX SODIUM 500 MG/1
TABLET, EXTENDED RELEASE ORAL
COMMUNITY
Start: 2020-09-30

## 2020-10-08 RX ORDER — PROCHLORPERAZINE MALEATE 10 MG
TABLET ORAL
COMMUNITY
Start: 2020-10-05 | End: 2020-10-08

## 2020-10-08 RX ORDER — FLASH GLUCOSE SENSOR
KIT MISCELLANEOUS
Qty: 2 KIT | Refills: 5 | Status: SHIPPED | OUTPATIENT
Start: 2020-10-08 | End: 2020-11-20 | Stop reason: SDUPTHER

## 2020-10-08 RX ORDER — SULFAMETHOXAZOLE AND TRIMETHOPRIM 800; 160 MG/1; MG/1
1 TABLET ORAL 2 TIMES DAILY
Qty: 20 TAB | Refills: 0 | Status: SHIPPED | OUTPATIENT
Start: 2020-10-08 | End: 2020-10-18

## 2020-10-08 RX ORDER — FLASH GLUCOSE SCANNING READER
EACH MISCELLANEOUS
Qty: 1 EACH | Refills: 0 | Status: SHIPPED | OUTPATIENT
Start: 2020-10-08 | End: 2020-11-20 | Stop reason: SDUPTHER

## 2020-10-08 NOTE — PROGRESS NOTES
Leigh Ann Haynes is a 77 y.o. female who was seen by synchronous (real-time) audio-video technology on 10/8/2020 for Bladder Infection (Patient reports urine dark yellow and frequent urination x 1 wk. ); Blood sugar problem (Patient would like to discuss Dexcom. Patient reports Dexcom to check blood sugar was discussed with Insurance. Patient states insurance will pay as long as she gets equip from company they recommend. ); and Referral / Consult  c/o dark yellow urine w odor   no fever, and painful with urination  She has DM, has no glucometer  She stopped using the freestyle ilir because   She saw DR Fatuma Morales at [facility], he did a brain scan 2 weeks ago. She was told she has \"something in her head taht could cause a stroke\". Assessment & Plan:   Diagnoses and all orders for this visit:    1. Urinary tract infection without hematuria, site unspecified  -     trimethoprim-sulfamethoxazole (BACTRIM DS, SEPTRA DS) 160-800 mg per tablet; Take 1 Tab by mouth two (2) times a day for 10 days. 2. Type 2 diabetes mellitus with complication, with long-term current use of insulin ([facility])  -     flash glucose sensor (FreeStyle Ilir 14 Day Sensor) kit; E11.8  Diabetes with complication with insulin use  Check blood sugar 4 times a day  -     flash glucose scanning reader (FreeStyle Ilir 14 Day [place]) misc; E11.8 diabetes type 2 with insulin and with complication            Subjective:       Prior to Admission medications    Medication Sig Start Date End Date Taking?  Authorizing Provider   divalproex ER (DEPAKOTE ER) 500 mg ER tablet  9/30/20  Yes Provider, Historical   flash glucose sensor (FreeStyle Ilir 14 Day Sensor) kit E11.8  Diabetes with complication with insulin use  Check blood sugar 4 times a day 10/8/20  Yes Dago Samuels MD   flash glucose scanning reader [facility] 14 Day [place]) [facility] E11.8 diabetes type 2 with insulin and with complication 71/0/21  Yes Dago Samuels MD trimethoprim-sulfamethoxazole (BACTRIM DS, SEPTRA DS) 160-800 mg per tablet Take 1 Tab by mouth two (2) times a day for 10 days. 10/8/20 10/18/20 Yes MD Leola Thurston U-100 Insulin 100 unit/mL (3 mL) inpn INJECT 22 UNITS UNDER THE SKIN DAILY AFTER DINNER 9/24/20  Yes Jenny Whitaker MD   levothyroxine (SYNTHROID) 175 mcg tablet TAKE ONE TABLET BY MOUTH DAILY BEFORE BREAKFAST 9/24/20  Yes Jenny Whitaker MD   brexpiprazole (REXULTI PO) Take  by mouth. Yes Provider, Historical   dulaglutide (Trulicity) 4.10 GN/2.9 mL sub-q pen 0.5 mL by SubCUTAneous route every seven (7) days. 6/17/20  Yes Jenny Whitaker MD   tiZANidine (ZANAFLEX) 4 mg tablet Take 1 Tab by mouth three (3) times daily. 4/27/20  Yes Jenny Whitaker MD   acetaminophen (TYLENOL) 500 mg tablet Take 1,000 mg by mouth every six (6) hours as needed for Pain. Yes Other, MD Darell   predniSONE (DELTASONE) 20 mg tablet 2 qd for 4 d. 1 1/2 qd for 4 d. 1 qd for 4 d, 1/2 qd for 4 d. 4/26/20  Yes Cielo Fuentes MD   omeprazole (PRILOSEC) 20 mg capsule Take 20 mg by mouth daily. Yes Other, MD Darell   busPIRone (BUSPAR) 10 mg tablet 10 mg = 1 tab each dose, PO, tid, # 270 tab, 0 Refills 3/2/20  Yes Provider, Historical   Jardiance 10 mg tablet  2/24/20  Yes Provider, Historical   metoprolol tartrate (LOPRESSOR) 25 mg tablet Take 50 mg by mouth two (2) times a day. 2/13/20  Yes Provider, Historical   omega-3s/dha/epa/fish oil/D3 (VITAMIN-D + OMEGA-3 PO) Take  by mouth. Yes Provider, Historical   diclofenac (VOLTAREN) 1 % gel Apply  to affected area four (4) times daily. Yes Provider, Historical   lisinopriL (PRINIVIL, ZESTRIL) 2.5 mg tablet Take  by mouth daily.    Yes Provider, Historical   gabapentin (NEURONTIN) 600 mg tablet Take 1/2 tab at bedtime x 1 wk; then 1 tab at bedtime x 1 wk; then 1 1/2 tab at bedtime; then 2 tab at bedtime 3/13/20  Yes Kandy Galo MD   chlorhexidine (PERIDEX) 0.12 % solution  9/4/19  Yes Provider, Historical   LIDOCAINE VISCOUS 2 % solution  9/4/19  Yes Provider, Historical   albuterol (PROVENTIL HFA, VENTOLIN HFA, PROAIR HFA) 90 mcg/actuation inhaler Take 2 Puffs by inhalation every four (4) hours as needed for Shortness of Breath or Wheezing. Order states ever 4-6 hours 7/29/19  Yes Yaneli Worthington MD   rivaroxaban (XARELTO) 20 mg tab tablet Take 20 mg by mouth daily. Yes Provider, Historical   traZODone (DESYREL) 150 mg tablet Take 100-200 mg by mouth nightly. Yes Provider, Historical   ondansetron (ZOFRAN ODT) 4 mg disintegrating tablet Take 1 Tab by mouth every eight (8) hours as needed for Nausea. 7/18/18  Yes Noel Hardin MD   DULoxetine (CYMBALTA) 60 mg capsule Take 2 Caps by mouth daily. 7/18/18  Yes Noel Hardin MD   rosuvastatin (CRESTOR) 40 mg tablet Take 20 mg by mouth nightly. Yes Provider, Historical   montelukast (SINGULAIR) 10 mg tablet Take 10 mg by mouth daily. Yes Provider, Historical   potassium chloride SA (MICRO-K) 10 mEq capsule Take 20 mEq by mouth two (2) times a day. Provider, Historical   Insulin Needles, Disposable, (BD ULTRA-FINE MINI PEN NEEDLE) 31 gauge x 3/16\" ndle Use daily as directed 11/18/19   Yaneli Worthington MD   buPROPion XL (WELLBUTRIN XL) 300 mg XL tablet Take 300 mg by mouth every morning. Patient reports no longer taking    Provider, Historical   rOPINIRole (REQUIP) 1 mg tablet Take 2 mg by mouth nightly.     Provider, Historical     Patient Active Problem List    Diagnosis Date Noted    Acquired hypothyroidism 09/25/2018    Type 2 diabetes with nephropathy ([facility] 07/27/2018    Severe obesity (BMI 35.0-39.9) 07/26/2018    Asthma 06/30/2018    Diabetes ([facility]     Factor V Leiden ([facility]     A-fib ([facility]     Supraorbital headache 10/07/2015    Isthmica nodosa salpingitis 07/12/2013    Anxiety 07/12/2013    Essential and other specified forms of tremor 07/12/2013     Current Outpatient Medications   Medication Sig Dispense Refill  divalproex ER (DEPAKOTE ER) 500 mg ER tablet       flash glucose sensor (FreeStyle Ilir 14 Day Sensor) kit E11.8  Diabetes with complication with insulin use  Check blood sugar 4 times a day 2 Kit 5    flash glucose scanning reader (FreeStyle Ilir 14 Day [place]) misc E11.8 diabetes type 2 with insulin and with complication 1 Each 0    trimethoprim-sulfamethoxazole (BACTRIM DS, SEPTRA DS) 160-800 mg per tablet Take 1 Tab by mouth two (2) times a day for 10 days. 20 Tab 0    Lantus Solostar U-100 Insulin 100 unit/mL (3 mL) inpn INJECT 22 UNITS UNDER THE SKIN DAILY AFTER DINNER 3 Adjustable Dose Pre-filled Pen Syringe 3    levothyroxine (SYNTHROID) 175 mcg tablet TAKE ONE TABLET BY MOUTH DAILY BEFORE BREAKFAST 90 Tab 0    brexpiprazole (REXULTI PO) Take  by mouth.  dulaglutide (Trulicity) 3.78 ZE/9.2 mL sub-q pen 0.5 mL by SubCUTAneous route every seven (7) days. 3 Pen 5    tiZANidine (ZANAFLEX) 4 mg tablet Take 1 Tab by mouth three (3) times daily. 90 Tab 1    acetaminophen (TYLENOL) 500 mg tablet Take 1,000 mg by mouth every six (6) hours as needed for Pain.  predniSONE (DELTASONE) 20 mg tablet 2 qd for 4 d. 1 1/2 qd for 4 d. 1 qd for 4 d, 1/2 qd for 4 d. 20 Tab 0    omeprazole (PRILOSEC) 20 mg capsule Take 20 mg by mouth daily.  busPIRone (BUSPAR) 10 mg tablet 10 mg = 1 tab each dose, PO, tid, # 270 tab, 0 Refills      Jardiance 10 mg tablet       metoprolol tartrate (LOPRESSOR) 25 mg tablet Take 50 mg by mouth two (2) times a day.  omega-3s/dha/epa/fish oil/D3 (VITAMIN-D + OMEGA-3 PO) Take  by mouth.  diclofenac (VOLTAREN) 1 % gel Apply  to affected area four (4) times daily.  lisinopriL (PRINIVIL, ZESTRIL) 2.5 mg tablet Take  by mouth daily.       gabapentin (NEURONTIN) 600 mg tablet Take 1/2 tab at bedtime x 1 wk; then 1 tab at bedtime x 1 wk; then 1 1/2 tab at bedtime; then 2 tab at bedtime 60 Tab 1    chlorhexidine (PERIDEX) 0.12 % solution       LIDOCAINE VISCOUS 2 % solution       albuterol (PROVENTIL HFA, VENTOLIN HFA, PROAIR HFA) 90 mcg/actuation inhaler Take 2 Puffs by inhalation every four (4) hours as needed for Shortness of Breath or Wheezing. Order states ever 4-6 hours 1 Inhaler 5    rivaroxaban (XARELTO) 20 mg tab tablet Take 20 mg by mouth daily.  traZODone (DESYREL) 150 mg tablet Take 100-200 mg by mouth nightly.  ondansetron (ZOFRAN ODT) 4 mg disintegrating tablet Take 1 Tab by mouth every eight (8) hours as needed for Nausea.  DULoxetine (CYMBALTA) 60 mg capsule Take 2 Caps by mouth daily.  rosuvastatin (CRESTOR) 40 mg tablet Take 20 mg by mouth nightly.  montelukast (SINGULAIR) 10 mg tablet Take 10 mg by mouth daily.  potassium chloride SA (MICRO-K) 10 mEq capsule Take 20 mEq by mouth two (2) times a day.  Insulin Needles, Disposable, (BD ULTRA-FINE MINI PEN NEEDLE) 31 gauge x 3/16\" ndle Use daily as directed 100 Pen Needle 5    buPROPion XL (WELLBUTRIN XL) 300 mg XL tablet Take 300 mg by mouth every morning. Patient reports no longer taking      rOPINIRole (REQUIP) 1 mg tablet Take 2 mg by mouth nightly.          ROS    Objective:     Patient-Reported Vitals 10/8/2020   Patient-Reported Weight 230lb   Patient-Reported Systolic  -   Patient-Reported Diastolic -        [INSTRUCTIONS:  \"[x]\" Indicates a positive item  \"[]\" Indicates a negative item  -- DELETE ALL ITEMS NOT EXAMINED]    Constitutional: [x] Appears well-developed and well-nourished [x] No apparent distress      [] Abnormal -     Mental status: [x] Alert and awake  [x] Oriented to person/place/time [x] Able to follow commands    [] Abnormal -     Eyes:   EOM    [x]  Normal    [] Abnormal -   Sclera  [x]  Normal    [] Abnormal -          Discharge [x]  None visible   [] Abnormal -     HENT: [x] Normocephalic, atraumatic  [] Abnormal -   [x] Mouth/Throat: Mucous membranes are moist    External Ears [x] Normal  [] Abnormal -    Neck: [x] No visualized mass [] Abnormal -     Pulmonary/Chest: [x] Respiratory effort normal   [x] No visualized signs of difficulty breathing or respiratory distress        [] Abnormal -      Musculoskeletal:   [x] Normal gait with no signs of ataxia         [x] Normal range of motion of neck        [] Abnormal -     Neurological:        [x] No Facial Asymmetry (Cranial nerve 7 motor function) (limited exam due to video visit)          [x] No gaze palsy        [] Abnormal -          Skin:        [x] No significant exanthematous lesions or discoloration noted on facial skin         [] Abnormal -            Psychiatric:       [x] Normal Affect [] Abnormal -        [x] No Hallucinations    Other pertinent observable physical exam findings:-        We discussed the expected course, resolution and complications of the diagnosis(es) in detail. Medication risks, benefits, costs, interactions, and alternatives were discussed as indicated. I advised her to contact the office if her condition worsens, changes or fails to improve as anticipated. She expressed understanding with the diagnosis(es) and plan. Odilon Ashley, who was evaluated through a patient-initiated, synchronous (real-time) audio-video encounter, and/or her healthcare decision maker, is aware that it is a billable service, with coverage as determined by her insurance carrier. She provided verbal consent to proceed: Yes, and patient identification was verified. It was conducted pursuant to the emergency declaration under the [address] [place] [address] authority and the [employer] General Act. A caregiver was present when appropriate. Ability to conduct physical exam was limited. I was at home. The patient was at home.       Rola Reddy MD

## 2020-10-08 NOTE — PROGRESS NOTES
1. Have you been to the ER, urgent care clinic since your last visit? Hospitalized since your last visit? Yes When: 9/2020 Where: [facility] Reason for visit: Headache    2. Have you seen or consulted any other health care providers outside of the [address] [place] [address] since your last visit? Include any pap smears or colon screening. No         Patient-Reported Vitals 10/8/2020   Patient-Reported Weight 230lb   Patient-Reported Systolic  -   Patient-Reported Diastolic -      Chief Complaint   Patient presents with    Bladder Infection     Patient reports urine dark yellow and frequent urination x 1 wk.  Blood sugar problem     Patient would like to discuss Dexcom. Patient reports Dexcom to check blood sugar was discussed with Insurance. Patient states insurance will pay as long as she gets equip from company they recommend.      Referral / Consult     Health Maintenance Due   Topic Date Due    Eye Exam Retinal or Dilated  05/04/1965    Shingrix Vaccine Age 50> (2 of 2) 08/04/2018    Flu Vaccine (1) 09/01/2020    Foot Exam Q1  09/05/2020    Medicare Yearly Exam  09/30/2020     3 most recent PHQ Screens 10/8/2020   PHQ Not Done -   Little interest or pleasure in doing things Not at all   Feeling down, depressed, irritable, or hopeless Not at all   Total Score PHQ 2 0   Trouble falling or staying asleep, or sleeping too much -   Feeling tired or having little energy -   Poor appetite, weight loss, or overeating -   Feeling bad about yourself - or that you are a failure or have let yourself or your family down -   Trouble concentrating on things such as school, work, reading, or watching TV -   Moving or speaking so slowly that other people could have noticed; or the opposite being so fidgety that others notice -   Thoughts of being better off dead, or hurting yourself in some way -   PHQ 9 Score -   How difficult have these problems made it for you to do your work, take care of your home and get along with others -

## 2020-10-09 ENCOUNTER — TELEPHONE (OUTPATIENT)
Dept: FAMILY MEDICINE CLINIC | Age: 65
End: 2020-10-09

## 2020-10-09 NOTE — TELEPHONE ENCOUNTER
----- Message from Romina Moe sent at 10/9/2020 11:39 AM EDT -----  Regarding: Dr Darius Quiñonez first and last name:Nara with [facility]      Reason for call:requesting to see if the physician order for  pts diabetic supplies was received on October 5,2020 by fax. Step 3 needs to be answered yes or no. Please advise.       Callback required yes/no and why:yes      Best contact number(s):[phone]      Details to clarify the request:      Romina Moe

## 2020-10-23 ENCOUNTER — DOCUMENTATION ONLY (OUTPATIENT)
Dept: FAMILY MEDICINE CLINIC | Age: 65
End: 2020-10-23

## 2020-10-28 ENCOUNTER — DOCUMENTATION ONLY (OUTPATIENT)
Dept: FAMILY MEDICINE CLINIC | Age: 65
End: 2020-10-28

## 2020-10-28 ENCOUNTER — TELEPHONE (OUTPATIENT)
Dept: FAMILY MEDICINE CLINIC | Age: 65
End: 2020-10-28

## 2020-10-28 NOTE — TELEPHONE ENCOUNTER
Pt states that her free style june came in but it does not have the meter with. Spoke with Nurse Tony and he informed me that meter was denied by insurance but he will be sending it back through again.

## 2020-12-03 ENCOUNTER — TELEPHONE (OUTPATIENT)
Dept: FAMILY MEDICINE CLINIC | Age: 65
End: 2020-12-03

## 2020-12-03 NOTE — TELEPHONE ENCOUNTER
[facility]  Fax[facility] [phone]    No action needed.

## 2020-12-03 NOTE — TELEPHONE ENCOUNTER
----- Message from Dion Faust sent at 12/2/2020  5:44 PM EST -----  Regarding: Dr. Robles [facility] first and last name: Avery Matos / [id number] [facility]    Reason for call: [facility] Order and Letter of Medical Necessity    Callback required yes/no and why: yes    Best contact number(s): [phone]     Details to clarify the request: Avery Carlo needs status on [employer] and Letter of Medical   Necessity. She needs them faxed back over to her.

## 2020-12-09 ENCOUNTER — TELEPHONE (OUTPATIENT)
Dept: FAMILY MEDICINE CLINIC | Age: 65
End: 2020-12-09

## 2020-12-09 NOTE — TELEPHONE ENCOUNTER
----- Message from Savanah Feliz sent at 12/9/2020  2:50 PM EST -----  Regarding: Dr. Darrold Angelucci / telephone  Caller's first and last name: Atilio Tran  with [id number] [facility]   Reason for call: Shante Obrien required yes/no and why: No  Best contact number(s):   Details to clarify the request: Danica Terrazas is needing the  Medication document that was sent over to be signed and refaxed as well as Rx form to be able to have supplies sent out to patient.   Fax number - [phone]

## 2020-12-11 ENCOUNTER — TELEPHONE (OUTPATIENT)
Dept: FAMILY MEDICINE CLINIC | Age: 65
End: 2020-12-11

## 2020-12-11 NOTE — TELEPHONE ENCOUNTER
Spoke to [facility] representative to resolve patient request.  Representative states Jennifer Scott was denied rx request.  Representative states only need Dr. Erasmo Sheridan to sign, date the letter and form sent via fax to approve rx request.    Letter and Form placed in folder on desk to be signed.

## 2020-12-16 ENCOUNTER — TELEPHONE (OUTPATIENT)
Dept: FAMILY MEDICINE CLINIC | Age: 65
End: 2020-12-16

## 2020-12-16 NOTE — TELEPHONE ENCOUNTER
----- Message from Murrieta Dry sent at 12/16/2020  3:17 PM EST -----  Regarding: Dr. Linnea Bumpers first and last name: Ab Sean / [id number] [facility]  Reason for call: Status Freestyle [employer] Order  Callback required yes/no and why: yes  Best contact number(s): [phone]  Details to clarify the request: Status on Certificate on Medical Necessity. It needs to be signed and the rx needs to be signed to complete the order. They need the electrical signature. Both need to be signed and dated and returned.

## 2020-12-18 ENCOUNTER — DOCUMENTATION ONLY (OUTPATIENT)
Dept: FAMILY MEDICINE CLINIC | Age: 65
End: 2020-12-18

## 2020-12-18 ENCOUNTER — TELEPHONE (OUTPATIENT)
Dept: FAMILY MEDICINE CLINIC | Age: 65
End: 2020-12-18

## 2020-12-18 NOTE — TELEPHONE ENCOUNTER
Left message on vm to call office back. 1st attempt. Please try to get more details if they call back. Her Letter and rx was sent to [employer] via fax 12/17/2020 by Nancy Botohe.

## 2020-12-18 NOTE — TELEPHONE ENCOUNTER
----- Message from Olga Edouard sent at 12/17/2020 12:59 PM EST -----  Regarding: Dr. Abner Snyder first and last name[facility]  Reason for call: Speak to someone on the clinical staff in office  Callback required yes/no and why: yes  Best contact number(s): [phone]  Details to clarify the request: Status on order

## 2020-12-23 ENCOUNTER — TELEPHONE (OUTPATIENT)
Dept: FAMILY MEDICINE CLINIC | Age: 65
End: 2020-12-23

## 2020-12-23 RX ORDER — LEVOTHYROXINE SODIUM 175 UG/1
TABLET ORAL
Qty: 90 TAB | Refills: 0 | Status: SHIPPED | OUTPATIENT
Start: 2020-12-23 | End: 2021-03-23

## 2020-12-23 NOTE — TELEPHONE ENCOUNTER
Two patient Identification confirmed. Spoke with  about Freestyle sensor.  states nanette Larry will be approved sensor from [facility] supply.  states they will not approve using [employer].  states patient has recently renewed her insurance policy. [facility] supply will fax form to be signed. New Form placed in folder.

## 2021-01-20 ENCOUNTER — TELEPHONE (OUTPATIENT)
Dept: FAMILY MEDICINE CLINIC | Age: 66
End: 2021-01-20

## 2021-01-28 ENCOUNTER — TELEPHONE (OUTPATIENT)
Dept: FAMILY MEDICINE CLINIC | Age: 66
End: 2021-01-28

## 2021-01-28 NOTE — TELEPHONE ENCOUNTER
----- Message from Brianda Kohli sent at 1/27/2021  4:26 PM EST -----  Regarding: Dr. Lizeth Baldwin: [phone]  General Message/Vendor Calls    Caller's first and last name: Ramón Macias- [facility]      Reason for call: Verbal Orders      Callback required yes/no and why: yes      Best contact number(s):[phone]      Details to clarify the request: Ramón Macias would like to know if Dr. Tanner Ponce will sign [address] orders starting Jan 21 for PT x2 week for 8 weeks, then ST x2 week for 3 weeks then x1 week for 3 weeks. VM is confidential so you may leave a message.       Brianda Kohli

## 2021-02-02 NOTE — TELEPHONE ENCOUNTER
Left message on vm to call office back. LM per Dr. Jorge Luis Paulino yes she will sign off on [facility] orders.

## 2021-02-24 ENCOUNTER — TELEPHONE (OUTPATIENT)
Dept: FAMILY MEDICINE CLINIC | Age: 66
End: 2021-02-24

## 2021-02-24 NOTE — TELEPHONE ENCOUNTER
LVM to schedule VV to discuss per Dr. Han Hernandez       ----- Message from Lorel Lennox sent at 2/24/2021  1:27 PM EST -----  Regarding: Trulicity Concerns  Ms. Steve Machado has been notified by her insurance company that they are no longer going to cover her Trulicity medication, she said they put 4 refills on it this last time, so she beleaves she is covered until middle of march, but after that she wants to know what to do. Her best contact number is [phone] Please call to advise on how to proceed in future.

## 2021-03-12 ENCOUNTER — TELEPHONE (OUTPATIENT)
Dept: FAMILY MEDICINE CLINIC | Age: 66
End: 2021-03-12

## 2021-03-12 NOTE — TELEPHONE ENCOUNTER
[facility] orders signed and sent via fax  Fax#: [phone]  File#:[id number]  Placed in Scan.

## 2021-03-23 RX ORDER — LEVOTHYROXINE SODIUM 175 UG/1
TABLET ORAL
Qty: 90 TAB | Refills: 0 | Status: SHIPPED | OUTPATIENT
Start: 2021-03-23

## 2021-03-31 ENCOUNTER — TELEPHONE (OUTPATIENT)
Dept: FAMILY MEDICINE CLINIC | Age: 66
End: 2021-03-31

## 2021-05-21 LAB — HEMOGLOBIN A1C: 6.3 %

## 2021-06-10 DIAGNOSIS — E11.8 TYPE 2 DIABETES MELLITUS WITH COMPLICATION, WITH LONG-TERM CURRENT USE OF INSULIN (HCC): ICD-10-CM

## 2021-06-10 DIAGNOSIS — Z79.4 TYPE 2 DIABETES MELLITUS WITH COMPLICATION, WITH LONG-TERM CURRENT USE OF INSULIN (HCC): ICD-10-CM

## 2021-06-11 RX ORDER — FLASH GLUCOSE SENSOR
KIT MISCELLANEOUS
Qty: 2 KIT | Refills: 4 | Status: SHIPPED | OUTPATIENT
Start: 2021-06-11

## 2022-03-19 PROBLEM — J45.909 ASTHMA: Status: ACTIVE | Noted: 2018-06-30

## 2022-03-19 PROBLEM — E03.9 ACQUIRED HYPOTHYROIDISM: Status: ACTIVE | Noted: 2018-09-25

## 2022-03-19 PROBLEM — E11.21 TYPE 2 DIABETES WITH NEPHROPATHY (HCC): Status: ACTIVE | Noted: 2018-07-27

## 2023-11-20 PROBLEM — G31.1 SENILE DEGENERATION OF BRAIN (HCC): Status: ACTIVE | Noted: 2023-11-20
